# Patient Record
Sex: MALE | Race: WHITE | NOT HISPANIC OR LATINO | Employment: OTHER | URBAN - NONMETROPOLITAN AREA
[De-identification: names, ages, dates, MRNs, and addresses within clinical notes are randomized per-mention and may not be internally consistent; named-entity substitution may affect disease eponyms.]

---

## 2024-08-15 ENCOUNTER — APPOINTMENT (OUTPATIENT)
Dept: OTHER | Facility: HOSPITAL | Age: 64
End: 2024-08-15
Payer: MEDICARE

## 2024-08-15 ENCOUNTER — HOSPITAL ENCOUNTER (INPATIENT)
Facility: HOSPITAL | Age: 64
LOS: 13 days | Discharge: HOME | End: 2024-08-28
Attending: INTERNAL MEDICINE | Admitting: FAMILY MEDICINE
Payer: MEDICARE

## 2024-08-15 DIAGNOSIS — R07.89 BURNING IN THE CHEST: ICD-10-CM

## 2024-08-15 DIAGNOSIS — J86.9 EMPYEMA LUNG: ICD-10-CM

## 2024-08-15 DIAGNOSIS — R63.4 WEIGHT LOSS: ICD-10-CM

## 2024-08-15 DIAGNOSIS — E83.42 HYPOMAGNESEMIA: ICD-10-CM

## 2024-08-15 DIAGNOSIS — D50.9 MICROCYTIC ANEMIA: ICD-10-CM

## 2024-08-15 DIAGNOSIS — Z87.820 HISTORY OF TRAUMATIC BRAIN INJURY: ICD-10-CM

## 2024-08-15 DIAGNOSIS — R53.1 GENERALIZED WEAKNESS: ICD-10-CM

## 2024-08-15 DIAGNOSIS — E83.52 HYPERCALCEMIA: ICD-10-CM

## 2024-08-15 DIAGNOSIS — Z74.09 IMPAIRED MOBILITY: Primary | ICD-10-CM

## 2024-08-15 DIAGNOSIS — E43 SEVERE MALNUTRITION: ICD-10-CM

## 2024-08-15 DIAGNOSIS — R93.89 ABNORMAL FINDING ON IMAGING: ICD-10-CM

## 2024-08-16 ENCOUNTER — APPOINTMENT (OUTPATIENT)
Dept: CT IMAGING | Facility: HOSPITAL | Age: 64
End: 2024-08-16
Payer: MEDICARE

## 2024-08-16 ENCOUNTER — TELEPHONE (OUTPATIENT)
Age: 64
End: 2024-08-16
Payer: MEDICARE

## 2024-08-16 PROBLEM — E43 SEVERE MALNUTRITION: Status: ACTIVE | Noted: 2024-08-16

## 2024-08-16 PROBLEM — R93.89 ABNORMAL FINDING ON IMAGING: Status: ACTIVE | Noted: 2024-08-16

## 2024-08-16 PROBLEM — D50.9 MICROCYTIC ANEMIA: Status: ACTIVE | Noted: 2024-08-16

## 2024-08-16 PROBLEM — E83.52 HYPERCALCEMIA: Status: ACTIVE | Noted: 2024-08-16

## 2024-08-16 PROBLEM — E83.42 HYPOMAGNESEMIA: Status: ACTIVE | Noted: 2024-08-16

## 2024-08-16 PROBLEM — R63.4 WEIGHT LOSS: Status: ACTIVE | Noted: 2024-08-16

## 2024-08-16 PROBLEM — Z87.820 HISTORY OF TRAUMATIC BRAIN INJURY: Status: ACTIVE | Noted: 2024-08-16

## 2024-08-16 LAB
ABO GROUP BLD: NORMAL
ALBUMIN SERPL-MCNC: 3.2 G/DL (ref 3.5–5.2)
ALBUMIN/GLOB SERPL: 0.8 G/DL
ALP SERPL-CCNC: 152 U/L (ref 39–117)
ALT SERPL W P-5'-P-CCNC: 19 U/L (ref 1–41)
AMPHET+METHAMPHET UR QL: NEGATIVE
AMPHETAMINES UR QL: NEGATIVE
ANION GAP SERPL CALCULATED.3IONS-SCNC: 11 MMOL/L (ref 5–15)
ANISOCYTOSIS BLD QL: ABNORMAL
APPEARANCE FLD: ABNORMAL
AST SERPL-CCNC: 42 U/L (ref 1–40)
BARBITURATES UR QL SCN: NEGATIVE
BASO STIPL COARSE BLD QL SMEAR: ABNORMAL
BASOPHILS # BLD MANUAL: 0.08 10*3/MM3 (ref 0–0.2)
BASOPHILS NFR BLD MANUAL: 1 % (ref 0–1.5)
BENZODIAZ UR QL SCN: NEGATIVE
BILIRUB SERPL-MCNC: 0.6 MG/DL (ref 0–1.2)
BLD GP AB SCN SERPL QL: NEGATIVE
BUN SERPL-MCNC: 14 MG/DL (ref 8–23)
BUN/CREAT SERPL: 19.4 (ref 7–25)
BUPRENORPHINE SERPL-MCNC: NEGATIVE NG/ML
CALCIUM SPEC-SCNC: 10.4 MG/DL (ref 8.6–10.5)
CANNABINOIDS SERPL QL: NEGATIVE
CHLORIDE SERPL-SCNC: 98 MMOL/L (ref 98–107)
CO2 SERPL-SCNC: 28 MMOL/L (ref 22–29)
COCAINE UR QL: NEGATIVE
COLOR FLD: ABNORMAL
CREAT SERPL-MCNC: 0.72 MG/DL (ref 0.76–1.27)
DEPRECATED RDW RBC AUTO: 56.5 FL (ref 37–54)
EGFRCR SERPLBLD CKD-EPI 2021: 101.4 ML/MIN/1.73
EOSINOPHIL # BLD MANUAL: 0 10*3/MM3 (ref 0–0.4)
EOSINOPHIL NFR BLD MANUAL: 0 % (ref 0.3–6.2)
ERYTHROCYTE [DISTWIDTH] IN BLOOD BY AUTOMATED COUNT: 23 % (ref 12.3–15.4)
FENTANYL UR-MCNC: NEGATIVE NG/ML
FERRITIN SERPL-MCNC: 29.14 NG/ML (ref 30–400)
GIANT PLATELETS: ABNORMAL
GLOBULIN UR ELPH-MCNC: 3.9 GM/DL
GLUCOSE SERPL-MCNC: 102 MG/DL (ref 65–99)
HCT VFR BLD AUTO: 25.1 % (ref 37.5–51)
HCT VFR BLD AUTO: 25.6 % (ref 37.5–51)
HGB BLD-MCNC: 6.6 G/DL (ref 13–17.7)
HGB BLD-MCNC: 7.2 G/DL (ref 13–17.7)
HYPOCHROMIA BLD QL: ABNORMAL
INR PPP: 0.98 (ref 0.91–1.09)
IRON 24H UR-MRATE: 14 MCG/DL (ref 59–158)
IRON SATN MFR SERPL: 3 % (ref 20–50)
LYMPHOCYTES # BLD MANUAL: 0.31 10*3/MM3 (ref 0.7–3.1)
LYMPHOCYTES NFR BLD MANUAL: 4 % (ref 5–12)
LYMPHOCYTES NFR FLD MANUAL: 3 %
MAGNESIUM SERPL-MCNC: 1.7 MG/DL (ref 1.6–2.4)
MCH RBC QN AUTO: 19.1 PG (ref 26.6–33)
MCHC RBC AUTO-ENTMCNC: 26.3 G/DL (ref 31.5–35.7)
MCV RBC AUTO: 72.8 FL (ref 79–97)
METHADONE UR QL SCN: NEGATIVE
METHOD: ABNORMAL
MICROCYTES BLD QL: ABNORMAL
MONOCYTES # BLD: 0.31 10*3/MM3 (ref 0.1–0.9)
NEUTROPHILS # BLD AUTO: 7.06 10*3/MM3 (ref 1.7–7)
NEUTROPHILS NFR BLD MANUAL: 91 % (ref 42.7–76)
NEUTROPHILS NFR FLD MANUAL: 97 %
NUC CELL # FLD: ABNORMAL /MM3
OPIATES UR QL: NEGATIVE
OXYCODONE UR QL SCN: NEGATIVE
PCP UR QL SCN: NEGATIVE
PHOSPHATE SERPL-MCNC: 3 MG/DL (ref 2.5–4.5)
PLATELET # BLD AUTO: 213 10*3/MM3 (ref 140–450)
PMV BLD AUTO: 9.6 FL (ref 6–12)
POLYCHROMASIA BLD QL SMEAR: ABNORMAL
POTASSIUM SERPL-SCNC: 3.1 MMOL/L (ref 3.5–5.2)
POTASSIUM SERPL-SCNC: 3.4 MMOL/L (ref 3.5–5.2)
PREALB SERPL-MCNC: 10.1 MG/DL (ref 20–40)
PROCALCITONIN SERPL-MCNC: 0.17 NG/ML (ref 0–0.25)
PROT SERPL-MCNC: 7.1 G/DL (ref 6–8.5)
PROTHROMBIN TIME: 13.4 SECONDS (ref 11.8–14.8)
RBC # BLD AUTO: 3.45 10*6/MM3 (ref 4.14–5.8)
RBC # FLD AUTO: ABNORMAL /MM3
RH BLD: POSITIVE
SODIUM SERPL-SCNC: 137 MMOL/L (ref 136–145)
T&S EXPIRATION DATE: NORMAL
T4 FREE SERPL-MCNC: 1.24 NG/DL (ref 0.93–1.7)
TIBC SERPL-MCNC: 457 MCG/DL (ref 298–536)
TRANSFERRIN SERPL-MCNC: 307 MG/DL (ref 200–360)
TRICYCLICS UR QL SCN: NEGATIVE
TSH SERPL DL<=0.05 MIU/L-ACNC: 3.28 UIU/ML (ref 0.27–4.2)
VARIANT LYMPHS NFR BLD MANUAL: 4 % (ref 19.6–45.3)
WBC MORPH BLD: NORMAL
WBC NRBC COR # BLD AUTO: 7.76 10*3/MM3 (ref 3.4–10.8)

## 2024-08-16 PROCEDURE — 85018 HEMOGLOBIN: CPT | Performed by: FAMILY MEDICINE

## 2024-08-16 PROCEDURE — 87075 CULTR BACTERIA EXCEPT BLOOD: CPT | Performed by: NURSE PRACTITIONER

## 2024-08-16 PROCEDURE — 87205 SMEAR GRAM STAIN: CPT | Performed by: NURSE PRACTITIONER

## 2024-08-16 PROCEDURE — 89051 BODY FLUID CELL COUNT: CPT | Performed by: NURSE PRACTITIONER

## 2024-08-16 PROCEDURE — 87070 CULTURE OTHR SPECIMN AEROBIC: CPT | Performed by: NURSE PRACTITIONER

## 2024-08-16 PROCEDURE — 99222 1ST HOSP IP/OBS MODERATE 55: CPT | Performed by: INTERNAL MEDICINE

## 2024-08-16 PROCEDURE — 88112 CYTOPATH CELL ENHANCE TECH: CPT | Performed by: NURSE PRACTITIONER

## 2024-08-16 PROCEDURE — 85025 COMPLETE CBC W/AUTO DIFF WBC: CPT | Performed by: INTERNAL MEDICINE

## 2024-08-16 PROCEDURE — P9016 RBC LEUKOCYTES REDUCED: HCPCS

## 2024-08-16 PROCEDURE — 84466 ASSAY OF TRANSFERRIN: CPT | Performed by: INTERNAL MEDICINE

## 2024-08-16 PROCEDURE — 84439 ASSAY OF FREE THYROXINE: CPT | Performed by: INTERNAL MEDICINE

## 2024-08-16 PROCEDURE — 88305 TISSUE EXAM BY PATHOLOGIST: CPT | Performed by: NURSE PRACTITIONER

## 2024-08-16 PROCEDURE — 83540 ASSAY OF IRON: CPT | Performed by: INTERNAL MEDICINE

## 2024-08-16 PROCEDURE — 71250 CT THORAX DX C-: CPT

## 2024-08-16 PROCEDURE — 87186 SC STD MICRODIL/AGAR DIL: CPT | Performed by: NURSE PRACTITIONER

## 2024-08-16 PROCEDURE — 82728 ASSAY OF FERRITIN: CPT | Performed by: INTERNAL MEDICINE

## 2024-08-16 PROCEDURE — 85610 PROTHROMBIN TIME: CPT | Performed by: INTERNAL MEDICINE

## 2024-08-16 PROCEDURE — 86901 BLOOD TYPING SEROLOGIC RH(D): CPT | Performed by: INTERNAL MEDICINE

## 2024-08-16 PROCEDURE — 80053 COMPREHEN METABOLIC PANEL: CPT | Performed by: INTERNAL MEDICINE

## 2024-08-16 PROCEDURE — 85007 BL SMEAR W/DIFF WBC COUNT: CPT | Performed by: INTERNAL MEDICINE

## 2024-08-16 PROCEDURE — 86900 BLOOD TYPING SEROLOGIC ABO: CPT

## 2024-08-16 PROCEDURE — 84132 ASSAY OF SERUM POTASSIUM: CPT | Performed by: INTERNAL MEDICINE

## 2024-08-16 PROCEDURE — 84145 PROCALCITONIN (PCT): CPT | Performed by: INTERNAL MEDICINE

## 2024-08-16 PROCEDURE — 84100 ASSAY OF PHOSPHORUS: CPT | Performed by: INTERNAL MEDICINE

## 2024-08-16 PROCEDURE — 83735 ASSAY OF MAGNESIUM: CPT | Performed by: INTERNAL MEDICINE

## 2024-08-16 PROCEDURE — 86920 COMPATIBILITY TEST SPIN: CPT

## 2024-08-16 PROCEDURE — 25010000002 NA FERRIC GLUC CPLX PER 12.5 MG: Performed by: INTERNAL MEDICINE

## 2024-08-16 PROCEDURE — 84134 ASSAY OF PREALBUMIN: CPT | Performed by: NURSE PRACTITIONER

## 2024-08-16 PROCEDURE — 86850 RBC ANTIBODY SCREEN: CPT | Performed by: INTERNAL MEDICINE

## 2024-08-16 PROCEDURE — 36430 TRANSFUSION BLD/BLD COMPNT: CPT

## 2024-08-16 PROCEDURE — 80307 DRUG TEST PRSMV CHEM ANLYZR: CPT | Performed by: INTERNAL MEDICINE

## 2024-08-16 PROCEDURE — 99222 1ST HOSP IP/OBS MODERATE 55: CPT | Performed by: SURGERY

## 2024-08-16 PROCEDURE — 87077 CULTURE AEROBIC IDENTIFY: CPT | Performed by: NURSE PRACTITIONER

## 2024-08-16 PROCEDURE — 25010000002 PIPERACILLIN SOD-TAZOBACTAM PER 1 G: Performed by: INTERNAL MEDICINE

## 2024-08-16 PROCEDURE — 0W9930Z DRAINAGE OF RIGHT PLEURAL CAVITY WITH DRAINAGE DEVICE, PERCUTANEOUS APPROACH: ICD-10-PCS | Performed by: STUDENT IN AN ORGANIZED HEALTH CARE EDUCATION/TRAINING PROGRAM

## 2024-08-16 PROCEDURE — 25810000003 SODIUM CHLORIDE 0.9 % SOLUTION: Performed by: INTERNAL MEDICINE

## 2024-08-16 PROCEDURE — 86900 BLOOD TYPING SEROLOGIC ABO: CPT | Performed by: INTERNAL MEDICINE

## 2024-08-16 PROCEDURE — 87040 BLOOD CULTURE FOR BACTERIA: CPT | Performed by: FAMILY MEDICINE

## 2024-08-16 PROCEDURE — 85014 HEMATOCRIT: CPT | Performed by: FAMILY MEDICINE

## 2024-08-16 PROCEDURE — 25010000002 LIDOCAINE 1 % SOLUTION: Performed by: STUDENT IN AN ORGANIZED HEALTH CARE EDUCATION/TRAINING PROGRAM

## 2024-08-16 PROCEDURE — 84443 ASSAY THYROID STIM HORMONE: CPT | Performed by: INTERNAL MEDICINE

## 2024-08-16 RX ORDER — POTASSIUM CHLORIDE 750 MG/1
40 CAPSULE, EXTENDED RELEASE ORAL EVERY 4 HOURS
Status: COMPLETED | OUTPATIENT
Start: 2024-08-16 | End: 2024-08-16

## 2024-08-16 RX ORDER — BISACODYL 5 MG/1
5 TABLET, DELAYED RELEASE ORAL DAILY PRN
Status: DISCONTINUED | OUTPATIENT
Start: 2024-08-16 | End: 2024-08-28 | Stop reason: HOSPADM

## 2024-08-16 RX ORDER — ONDANSETRON 2 MG/ML
4 INJECTION INTRAMUSCULAR; INTRAVENOUS EVERY 6 HOURS PRN
Status: DISCONTINUED | OUTPATIENT
Start: 2024-08-16 | End: 2024-08-28 | Stop reason: HOSPADM

## 2024-08-16 RX ORDER — ACETAMINOPHEN 325 MG/1
650 TABLET ORAL EVERY 4 HOURS PRN
Status: DISCONTINUED | OUTPATIENT
Start: 2024-08-16 | End: 2024-08-28 | Stop reason: HOSPADM

## 2024-08-16 RX ORDER — SODIUM CHLORIDE 9 MG/ML
40 INJECTION, SOLUTION INTRAVENOUS AS NEEDED
Status: DISCONTINUED | OUTPATIENT
Start: 2024-08-16 | End: 2024-08-26

## 2024-08-16 RX ORDER — LIDOCAINE HYDROCHLORIDE AND EPINEPHRINE 10; 10 MG/ML; UG/ML
20 INJECTION, SOLUTION INFILTRATION; PERINEURAL ONCE
Status: DISCONTINUED | OUTPATIENT
Start: 2024-08-16 | End: 2024-08-16

## 2024-08-16 RX ORDER — ACETAMINOPHEN 160 MG/5ML
650 SOLUTION ORAL EVERY 4 HOURS PRN
Status: DISCONTINUED | OUTPATIENT
Start: 2024-08-16 | End: 2024-08-28 | Stop reason: HOSPADM

## 2024-08-16 RX ORDER — SODIUM CHLORIDE 0.9 % (FLUSH) 0.9 %
10 SYRINGE (ML) INJECTION AS NEEDED
Status: DISCONTINUED | OUTPATIENT
Start: 2024-08-16 | End: 2024-08-26

## 2024-08-16 RX ORDER — ACETAMINOPHEN 650 MG/1
650 SUPPOSITORY RECTAL EVERY 4 HOURS PRN
Status: DISCONTINUED | OUTPATIENT
Start: 2024-08-16 | End: 2024-08-28 | Stop reason: HOSPADM

## 2024-08-16 RX ORDER — LIDOCAINE HYDROCHLORIDE 10 MG/ML
INJECTION, SOLUTION INFILTRATION; PERINEURAL AS NEEDED
Status: COMPLETED | OUTPATIENT
Start: 2024-08-16 | End: 2024-08-16

## 2024-08-16 RX ORDER — POLYETHYLENE GLYCOL 3350 17 G/17G
17 POWDER, FOR SOLUTION ORAL DAILY PRN
Status: DISCONTINUED | OUTPATIENT
Start: 2024-08-16 | End: 2024-08-28 | Stop reason: HOSPADM

## 2024-08-16 RX ORDER — SODIUM CHLORIDE 9 MG/ML
75 INJECTION, SOLUTION INTRAVENOUS CONTINUOUS
Status: DISCONTINUED | OUTPATIENT
Start: 2024-08-16 | End: 2024-08-19

## 2024-08-16 RX ORDER — SODIUM CHLORIDE 0.9 % (FLUSH) 0.9 %
10 SYRINGE (ML) INJECTION EVERY 12 HOURS SCHEDULED
Status: DISCONTINUED | OUTPATIENT
Start: 2024-08-16 | End: 2024-08-24 | Stop reason: SDUPTHER

## 2024-08-16 RX ORDER — AMOXICILLIN 250 MG
2 CAPSULE ORAL 2 TIMES DAILY PRN
Status: DISCONTINUED | OUTPATIENT
Start: 2024-08-16 | End: 2024-08-28 | Stop reason: HOSPADM

## 2024-08-16 RX ORDER — BISACODYL 10 MG
10 SUPPOSITORY, RECTAL RECTAL DAILY PRN
Status: DISCONTINUED | OUTPATIENT
Start: 2024-08-16 | End: 2024-08-28 | Stop reason: HOSPADM

## 2024-08-16 RX ADMIN — Medication 10 ML: at 20:17

## 2024-08-16 RX ADMIN — POTASSIUM CHLORIDE 40 MEQ: 750 CAPSULE, EXTENDED RELEASE ORAL at 17:04

## 2024-08-16 RX ADMIN — PIPERACILLIN AND TAZOBACTAM 4.5 G: 4; .5 INJECTION, POWDER, FOR SOLUTION INTRAVENOUS at 19:26

## 2024-08-16 RX ADMIN — SODIUM CHLORIDE 75 ML/HR: 9 INJECTION, SOLUTION INTRAVENOUS at 00:59

## 2024-08-16 RX ADMIN — POTASSIUM CHLORIDE 40 MEQ: 750 CAPSULE, EXTENDED RELEASE ORAL at 07:04

## 2024-08-16 RX ADMIN — SODIUM CHLORIDE 250 MG: 9 INJECTION, SOLUTION INTRAVENOUS at 10:19

## 2024-08-16 RX ADMIN — POTASSIUM CHLORIDE 40 MEQ: 750 CAPSULE, EXTENDED RELEASE ORAL at 10:33

## 2024-08-16 RX ADMIN — Medication 10 ML: at 10:19

## 2024-08-16 RX ADMIN — Medication 5 MG: at 00:56

## 2024-08-16 RX ADMIN — LIDOCAINE HYDROCHLORIDE 4 ML: 10 INJECTION, SOLUTION INFILTRATION; PERINEURAL at 15:10

## 2024-08-16 RX ADMIN — Medication 10 ML: at 01:07

## 2024-08-16 NOTE — PLAN OF CARE
Goal Outcome Evaluation:           Progress: no change  Outcome Evaluation: VSS NSR 82-85.  Pt arrived to floor from other hospital midway through shift.  Has denied any pain nausea or sob since arrival.  IV fluids per MAR.  Plans for chest CT in am.  Will continue to monitor.                                No

## 2024-08-16 NOTE — TELEPHONE ENCOUNTER
New Consult   Ravin Garza 1960    Jimena @ [facility] [phone] called in consult. Dr. Sawyer is consulting for empyema. Patient is in room 452.      Ok, CBL
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
## 2024-08-16 NOTE — CONSULTS
Adult Nutrition  Assessment/PES    Patient Name:  Ravin Garza  YOB: 1960  MRN: [id number]  Admit Date:  8/15/2024    Assessment Date:  8/16/2024     Reason for Assessment       Row Name 08/16/24 1416          Reason for Assessment    Reason For Assessment nurse/nurse practitioner consult;per organizational policy;physician consult     Diagnosis neurologic conditions;pulmonary disease     Identified At Risk by Screening Criteria MST SCORE 2+;unintentional loss of 10 lbs or more in the past 2 mos;reduced oral intake over the last month               Nutrition/Diet History       Row Name 08/16/24 1431          Nutrition/Diet History    Typical Intake (Food/Fluid/EN/PN) Pt in the room with his sisters. Pt was able to answer most of the questions, although he was noticeably fatigued. Pt reports significant wt fluctuations over the past year, ranging from 170lb to 180lb and back to 170lb. Pt noted having a very low appetite at home and mentioned that he could go 'three days without eating,' as well as experiencing intense nausea. Pt started drinking Boost Plus Vanilla at home to try to gain weight.     Supplemental Drinks/Foods/Additives Boost plus vanilla     Factors Affecting Nutritional Intake appetite               Row Name 08/16/24 1423          Malnutrition Severity Assessment    Malnutrition Type Chronic Disease - Related Malnutrition        Insufficient Energy Intake     Insufficient Energy Intake Findings Severe     Insufficient Energy Intake  <75% of est. energy requirement for > or equal to 1 month        Unintentional Weight Loss     Unintentional Weight Loss Findings Severe     Unintentional Weight Loss  Weight loss greater than 5% in one month        Muscle Loss    Loss of Muscle Mass Findings Severe     [place] Region Severe - deep hollowing/scooping, lack of muscle to touch, facial bones well defined     Clavicle Bone Region Severe - protruding prominent bone     Acromion Bone Region  Severe - squared shoulders, bones, and acromion process protrusion prominent     Scapular Bone Region Severe - prominent bones, depressions easily visible between ribs, scapula, spine, shoulders     Dorsal Hand Region Severe - prominent depression     Patellar Region Severe - prominent bone, square looking, very little muscle definition     Anterior Thigh Region Severe - line/depression along thigh, obviously thin     Posterior Calf Region Severe - thin with very little definition/firmness  Left BKA        Fat Loss    Subcutaneous Fat Loss Findings Severe     Orbital Region  Severe - pronounced hollowness/depression, dark circles, loose saggy skin     Upper Arm Region Severe - mostly skin, very little space between folds, fingers touch     Thoracic & Lumbar Region Moderate - ribs visible with mild depressions, iliac crest somewhat prominent        Declining Functional Status    Declining Functional Status Findings --  Fatigue and poor wound healing        Criteria Met (Must meet criteria for severity in at least 2 of these categories: M Wasting, Fat Loss, Fluid, Secondary Signs, Wt. Status, Intake)    Patient meets criteria for  Severe Malnutrition            Labs/Tests/Procedures/Meds       Row Name 08/16/24 1447          Labs/Procedures/Meds    Lab Results Reviewed reviewed     Lab Results Comments Glu 102, K 3.1, Alb 3.2, Hb 7.1 and MCV of 72        Diagnostic Tests/Procedures    Diagnostic Test/Procedure Reviewed reviewed     Diagnostic Test/Procedures Comments Ct of pelvis: cirrhosis and ascites        Medications    Pertinent Medications Reviewed reviewed               Physical Findings       Row Name 08/16/24 1449          Physical Findings    Overall Physical Appearance Generalized weakness, Ivan Score 20, room air, AKA,Ill-fitting leg prosthesis.               Estimated/Assessed Needs - Anthropometrics       Row Name 08/16/24 1450          Anthropometrics    Weight for Calculation 77 kg (169 lb 12.1 oz)      Additional Documentation --  Adj BW: 179lb        Estimated/Assessed Needs    Additional Documentation KCAL/KG (Group);Fluid Requirements (Group);Protein Requirements (Group)        KCAL/KG    KCAL/KG 25 Kcal/Kg (kcal);30 Kcal/Kg (kcal)     25 Kcal/Kg (kcal) 1925     30 Kcal/Kg (kcal) 2310        Protein Requirements    Weight Used For Protein Calculations 77 kg (169 lb 12.1 oz)     Est Protein Requirement Amount (gms/kg) 1.5 gm protein     Estimated Protein Requirements (gms/day) 115.5        Fluid Requirements    Fluid Requirements (mL/day) 2310     RDA Method (mL) 2310                 Nutrition Prescription Ordered       Row Name 08/16/24 1458          Nutrition Prescription PO    Current PO Diet NPO               Evaluation of Received Nutrient/Fluid Intake       Row Name 08/16/24 1458          Nutrient/Fluid Evaluation    Number of Days Evaluated 1 day        Fluid Intake Evaluation    Oral Fluid (mL) 240        PO Evaluation    Number of Days PO Intake Evaluated 1 day     Number of Meals 1  Breakfast     % PO Intake 100               Malnutrition Severity Assessment       Row Name 08/16/24 1423          Malnutrition Severity Assessment    Malnutrition Type Chronic Disease - Related Malnutrition        Insufficient Energy Intake     Insufficient Energy Intake Findings Severe     Insufficient Energy Intake  <75% of est. energy requirement for > or equal to 1 month        Unintentional Weight Loss     Unintentional Weight Loss Findings Severe     Unintentional Weight Loss  Weight loss greater than 5% in one month        Muscle Loss    Loss of Muscle Mass Findings Severe     [place] Region Severe - deep hollowing/scooping, lack of muscle to touch, facial bones well defined     Clavicle Bone Region Severe - protruding prominent bone     Acromion Bone Region Severe - squared shoulders, bones, and acromion process protrusion prominent     Scapular Bone Region Severe - prominent bones, depressions easily visible  between ribs, scapula, spine, shoulders     Dorsal Hand Region Severe - prominent depression     Patellar Region Severe - prominent bone, square looking, very little muscle definition     Anterior Thigh Region Severe - line/depression along thigh, obviously thin     Posterior Calf Region Severe - thin with very little definition/firmness  Left BKA        Fat Loss    Subcutaneous Fat Loss Findings Severe     Orbital Region  Severe - pronounced hollowness/depression, dark circles, loose saggy skin     Upper Arm Region Severe - mostly skin, very little space between folds, fingers touch     Thoracic & Lumbar Region Moderate - ribs visible with mild depressions, iliac crest somewhat prominent        Declining Functional Status    Declining Functional Status Findings --  Fatigue and poor wound healing        Criteria Met (Must meet criteria for severity in at least 2 of these categories: M Wasting, Fat Loss, Fluid, Secondary Signs, Wt. Status, Intake)    Patient meets criteria for  Severe Malnutrition              Problem/Interventions:   Problem 1       Row Name 08/16/24 1501          Nutrition Diagnoses Problem 1    Problem 1 Other (comment)  Severe Malnutrition in the Context of Chronic Ilness     Etiology (related to) Factors Affecting Nutrition;Medical Diagnosis     Hepatic Cirrhosis     Neurological TBI     Appetite Poor     Other --     Signs/Symptoms (evidenced by) Report of Mnimal PO Intake;Report/Observation;NPO  MST results     Unintended Weight Change Loss     Number of Pounds Lost 10lb     Reported/Observed By RD/Tech     Other Comment Muscle and fat wasting per NFPE and chronic non healing wounds               Intervention Goal       Row Name 08/16/24 1513          Intervention Goal    General Meet nutritional needs for age/condition;Reduce/improve symptoms     PO Establish PO;Increase intake;PO intake (%)     PO Intake % 75 %     Weight Appropriate weight gain               Nutrition Intervention       Row  Name 08/16/24 1515          Nutrition Intervention    RD/Tech Action Await begin PO;Encourage intake;Supplement provided;Follow Tx progress;Care plan reviewd               Nutrition Prescription       Row Name 08/16/24 1518          Nutrition Prescription PO    PO Prescription Begin/change supplement     Supplement Boost Plus     Supplement Frequency 3 times a day     New PO Prescription Ordered? Yes  Co sign by Jazmin Perez RD, LD.               Education/Evaluation       Row Name 08/16/24 1516          Education    Education Provided education regarding     Provided education regarding Nutrition related factor        Monitor/Evaluation    Monitor Per protocol;PO intake;Supplement intake;Weight;Symptoms              Electronically signed by:  Valeria Bronson  08/16/24 15:22 CDT

## 2024-08-16 NOTE — PROGRESS NOTES
"Pharmacy Review Antimicrobial Stewardship     Current Antimicrobials:   Pharmacy to Dose Zosyn  piperacillin-tazobactam (ZOSYN) 4.5 g IVPB in 100 mL NS MBP (CD)    Indication(s): Empyema  Days of Therapy: 1 of 5    Is the therapy & duration appropriate based on evidence-based guidelines?: appropriate  Changes Recommended: No    Assessment/Action: Received a \"Pharmacy to Dose\" consult for the indication of empyema. Reviewed the patient's age, CrCl, and other labs. ID FOLLOWING.Initiated the patient on Zosyn extended infusion protocol of 4.5 g IVPB ONCE followed by 4.5 g IVPB Q8H for the duration of 5 days. No other action required at this time per pharmacy based on available information and current clinical status. Will continue routine follow-up evaluation and make any necessary adjustments.    Jaclyn He, PharmD  08/16/24 18:29 CDT      Subjective:   Diagnosis:   No diagnosis found.     Patient Active Problem List   Diagnosis    Generalized weakness    Microcytic anemia    Hypomagnesemia    Weight loss    History of traumatic brain injury    Abnormal finding on imaging - outpatient ct abd w/ ? empyema    Hypercalcemia    Severe malnutrition       Allergies:  Allergies as of 08/15/2024    (No Known Allergies)       Past Medical History:   Diagnosis Date    Amputee, above knee     bilateral    ETOH abuse        Objective:  Current Antimicrobial Medications:   Anti-Infectives (From admission, onward)      Ordered     Dose/Rate Route Frequency Start Stop    08/16/24 1826  piperacillin-tazobactam (ZOSYN) 4.5 g IVPB in 100 mL NS MBP (CD)        Ordering Provider: Mickey Laguerre MD    4.5 g  over 4 Hours Intravenous Every 8 Hours 08/17/24 0115 08/22/24 0114    08/16/24 1826  piperacillin-tazobactam (ZOSYN) 4.5 g IVPB in 100 mL NS MBP (CD)        Ordering Provider: Mickey Laguerre MD    4.5 g  over 30 Minutes Intravenous Once 08/16/24 1915 08/16/24 1824  Pharmacy to Dose Zosyn        Ordering Provider: " "Mickey Laguerre MD     Does not apply Continuous PRN 08/16/24 1823 08/23/24 1822            Culture Results:  No results found for: \"MRSAPCR\", \"BLOODCX\", \"BCIDPCR\", \"URINECX\", \"WOUNDCX\", \"RESPCX\", \"RVP\"    Microbiology Results (last 10 days)       ** No results found for the last 240 hours. **            Lab Results   Component Value Date    WBC 7.76 08/16/2024    WBC 12.1 (H) 09/10/2021    WBC 14.0 (H) 08/27/2021     Temp Readings from Last 1 Encounters:   08/16/24 97.2 °F (36.2 °C) (Oral)       "

## 2024-08-17 ENCOUNTER — APPOINTMENT (OUTPATIENT)
Dept: GENERAL RADIOLOGY | Facility: HOSPITAL | Age: 64
End: 2024-08-17
Payer: MEDICARE

## 2024-08-17 LAB
ALBUMIN SERPL-MCNC: 2.9 G/DL (ref 3.5–5.2)
ALBUMIN/GLOB SERPL: 0.8 G/DL
ALP SERPL-CCNC: 158 U/L (ref 39–117)
ALT SERPL W P-5'-P-CCNC: 21 U/L (ref 1–41)
ANION GAP SERPL CALCULATED.3IONS-SCNC: 12 MMOL/L (ref 5–15)
AST SERPL-CCNC: 43 U/L (ref 1–40)
BASOPHILS # BLD AUTO: 0.03 10*3/MM3 (ref 0–0.2)
BASOPHILS NFR BLD AUTO: 0.4 % (ref 0–1.5)
BH BB BLOOD EXPIRATION DATE: NORMAL
BH BB BLOOD TYPE BARCODE: 6200
BH BB DISPENSE STATUS: NORMAL
BH BB PRODUCT CODE: NORMAL
BH BB UNIT NUMBER: NORMAL
BILIRUB SERPL-MCNC: 0.7 MG/DL (ref 0–1.2)
BUN SERPL-MCNC: 11 MG/DL (ref 8–23)
BUN/CREAT SERPL: 15.7 (ref 7–25)
CALCIUM SPEC-SCNC: 9.5 MG/DL (ref 8.6–10.5)
CHLORIDE SERPL-SCNC: 100 MMOL/L (ref 98–107)
CHOLEST SERPL-MCNC: 100 MG/DL (ref 0–200)
CO2 SERPL-SCNC: 24 MMOL/L (ref 22–29)
CREAT SERPL-MCNC: 0.7 MG/DL (ref 0.76–1.27)
CROSSMATCH INTERPRETATION: NORMAL
DEPRECATED RDW RBC AUTO: 58.2 FL (ref 37–54)
EGFRCR SERPLBLD CKD-EPI 2021: 102.3 ML/MIN/1.73
EOSINOPHIL # BLD AUTO: 0.06 10*3/MM3 (ref 0–0.4)
EOSINOPHIL NFR BLD AUTO: 0.7 % (ref 0.3–6.2)
ERYTHROCYTE [DISTWIDTH] IN BLOOD BY AUTOMATED COUNT: 23.4 % (ref 12.3–15.4)
GLOBULIN UR ELPH-MCNC: 3.7 GM/DL
GLUCOSE SERPL-MCNC: 82 MG/DL (ref 65–99)
HBA1C MFR BLD: 5 % (ref 4.8–5.6)
HCT VFR BLD AUTO: 28.5 % (ref 37.5–51)
HDLC SERPL-MCNC: 45 MG/DL (ref 40–60)
HGB BLD-MCNC: 7.8 G/DL (ref 13–17.7)
IMM GRANULOCYTES # BLD AUTO: 0.04 10*3/MM3 (ref 0–0.05)
IMM GRANULOCYTES NFR BLD AUTO: 0.5 % (ref 0–0.5)
LDLC SERPL CALC-MCNC: 40 MG/DL (ref 0–100)
LDLC/HDLC SERPL: 0.9 {RATIO}
LYMPHOCYTES # BLD AUTO: 0.63 10*3/MM3 (ref 0.7–3.1)
LYMPHOCYTES NFR BLD AUTO: 7.8 % (ref 19.6–45.3)
MCH RBC QN AUTO: 20 PG (ref 26.6–33)
MCHC RBC AUTO-ENTMCNC: 27.4 G/DL (ref 31.5–35.7)
MCV RBC AUTO: 73.1 FL (ref 79–97)
MONOCYTES # BLD AUTO: 0.71 10*3/MM3 (ref 0.1–0.9)
MONOCYTES NFR BLD AUTO: 8.8 % (ref 5–12)
NEUTROPHILS NFR BLD AUTO: 6.61 10*3/MM3 (ref 1.7–7)
NEUTROPHILS NFR BLD AUTO: 81.8 % (ref 42.7–76)
PLATELET # BLD AUTO: 190 10*3/MM3 (ref 140–450)
PMV BLD AUTO: 9.1 FL (ref 6–12)
POTASSIUM SERPL-SCNC: 3.5 MMOL/L (ref 3.5–5.2)
PROT SERPL-MCNC: 6.6 G/DL (ref 6–8.5)
RBC # BLD AUTO: 3.9 10*6/MM3 (ref 4.14–5.8)
SODIUM SERPL-SCNC: 136 MMOL/L (ref 136–145)
TRIGL SERPL-MCNC: 72 MG/DL (ref 0–150)
UNIT  ABO: NORMAL
UNIT  RH: NORMAL
VLDLC SERPL-MCNC: 15 MG/DL (ref 5–40)
WBC NRBC COR # BLD AUTO: 8.08 10*3/MM3 (ref 3.4–10.8)

## 2024-08-17 PROCEDURE — 25010000002 ALTEPLASE 2 MG RECONSTITUTED SOLUTION 1 EACH VIAL: Performed by: NURSE PRACTITIONER

## 2024-08-17 PROCEDURE — 25010000002 NA FERRIC GLUC CPLX PER 12.5 MG: Performed by: INTERNAL MEDICINE

## 2024-08-17 PROCEDURE — 99232 SBSQ HOSP IP/OBS MODERATE 35: CPT | Performed by: SURGERY

## 2024-08-17 PROCEDURE — 85025 COMPLETE CBC W/AUTO DIFF WBC: CPT | Performed by: FAMILY MEDICINE

## 2024-08-17 PROCEDURE — 25010000002 PIPERACILLIN SOD-TAZOBACTAM PER 1 G: Performed by: INTERNAL MEDICINE

## 2024-08-17 PROCEDURE — 80061 LIPID PANEL: CPT | Performed by: FAMILY MEDICINE

## 2024-08-17 PROCEDURE — 25810000003 SODIUM CHLORIDE 0.9 % SOLUTION: Performed by: INTERNAL MEDICINE

## 2024-08-17 PROCEDURE — 83036 HEMOGLOBIN GLYCOSYLATED A1C: CPT | Performed by: FAMILY MEDICINE

## 2024-08-17 PROCEDURE — 99232 SBSQ HOSP IP/OBS MODERATE 35: CPT | Performed by: INTERNAL MEDICINE

## 2024-08-17 PROCEDURE — 71045 X-RAY EXAM CHEST 1 VIEW: CPT

## 2024-08-17 PROCEDURE — 80053 COMPREHEN METABOLIC PANEL: CPT | Performed by: FAMILY MEDICINE

## 2024-08-17 RX ORDER — FAMOTIDINE 10 MG/ML
20 INJECTION, SOLUTION INTRAVENOUS 2 TIMES DAILY
Status: DISCONTINUED | OUTPATIENT
Start: 2024-08-17 | End: 2024-08-18

## 2024-08-17 RX ORDER — CALCIUM CARBONATE 500 MG/1
2 TABLET, CHEWABLE ORAL 3 TIMES DAILY PRN
Status: DISCONTINUED | OUTPATIENT
Start: 2024-08-17 | End: 2024-08-28 | Stop reason: HOSPADM

## 2024-08-17 RX ADMIN — SODIUM CHLORIDE 75 ML/HR: 9 INJECTION, SOLUTION INTRAVENOUS at 22:43

## 2024-08-17 RX ADMIN — SODIUM CHLORIDE 75 ML/HR: 9 INJECTION, SOLUTION INTRAVENOUS at 06:57

## 2024-08-17 RX ADMIN — DORNASE ALFA 5 MG: 1 SOLUTION RESPIRATORY (INHALATION) at 10:11

## 2024-08-17 RX ADMIN — PIPERACILLIN AND TAZOBACTAM 4.5 G: 4; .5 INJECTION, POWDER, FOR SOLUTION INTRAVENOUS at 01:16

## 2024-08-17 RX ADMIN — PIPERACILLIN AND TAZOBACTAM 4.5 G: 4; .5 INJECTION, POWDER, FOR SOLUTION INTRAVENOUS at 11:14

## 2024-08-17 RX ADMIN — ALTEPLASE 10 MG: 2.2 INJECTION, POWDER, LYOPHILIZED, FOR SOLUTION INTRAVENOUS at 10:11

## 2024-08-17 RX ADMIN — Medication 10 ML: at 08:54

## 2024-08-17 RX ADMIN — SODIUM CHLORIDE 250 MG: 9 INJECTION, SOLUTION INTRAVENOUS at 08:54

## 2024-08-17 RX ADMIN — Medication 10 ML: at 20:24

## 2024-08-17 RX ADMIN — PIPERACILLIN AND TAZOBACTAM 4.5 G: 4; .5 INJECTION, POWDER, FOR SOLUTION INTRAVENOUS at 18:36

## 2024-08-17 RX ADMIN — FAMOTIDINE 20 MG: 10 INJECTION INTRAVENOUS at 20:24

## 2024-08-17 NOTE — PLAN OF CARE
Goal Outcome Evaluation:           Progress: no change  Outcome Evaluation: VSS NSR-ST  per tele.  Denies pain nausea or sob this shift.  Chest tube CDI, no output so far this shift.  IV abx per mar.  Will continue to monitor.
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
## 2024-08-17 NOTE — PROGRESS NOTES
"    [facility] Cardiothoracic Surgery  PROGRESS NOTE       S/P IR insertion right pigtail catheter on 08/16/2024    Subjective:  Sitting up in bed eating breakfast. Denies any complaints.       Objective:      /78 (BP Location: Right arm, Patient Position: Sitting)   Pulse 85   Temp 97.4 °F (36.3 °C) (Oral)   Resp 22   Ht 188 cm (74\")   Wt 77.9 kg (171 lb 12.8 oz)   SpO2 98%   BMI 22.06 kg/m²       Intake/Output Summary (Last 24 hours) at 8/17/2024 1048  Last data filed at 8/17/2024 0730  Gross per 24 hour   Intake 360 ml   Output 565 ml   Net -205 ml       CT Output: no output recorded since insertion.    PE:  Vitals:    08/17/24 0730   BP: 104/78   Pulse: 85   Resp: 22   Temp: 97.4 °F (36.3 °C)   SpO2: 98%     GENERAL: NAD, resting comfortably, normal color  CARDIOVASCULAR: Normal HT with RRR. No murmurs, gallops or rubs.   PULMONARY: No labored breathing. Clear, but diminished bilateral breath sounds R>L. No wheezes or rhonchi.   ABDOMEN: Soft, non-tender/non-distended with active bowel sounds.  EXTREMITIES: No clubbing or cyanosis. Left BKA. No peripheral edema - right leg. Palpable pedal pulses on right.       Lab Results (last 72 hours)       Procedure Component Value Units Date/Time    Body Fluid Culture - Body Fluid, Pleural Cavity [[id number]] Collected: 08/16/24 1550    Specimen: Body Fluid from Pleural Cavity Updated: 08/17/24 1037     Body Fluid Culture No growth     Gram Stain Many (4+) WBCs seen      No organisms seen    Hemoglobin A1c [[id number]]  (Normal) Collected: 08/17/24 0408    Specimen: Blood Updated: 08/17/24 0519     Hemoglobin A1C 5.00 %     Narrative:      Hemoglobin A1C Ranges:    Increased Risk for Diabetes  5.7% to 6.4%  Diabetes                     >= 6.5%  Diabetic Goal                < 7.0%    Comprehensive Metabolic Panel [[id number]]  (Abnormal) Collected: 08/17/24 0409    Specimen: Blood Updated: 08/17/24 0450     Glucose 82 mg/dL      BUN 11 mg/dL      " Creatinine 0.70 mg/dL      Sodium 136 mmol/L      Potassium 3.5 mmol/L      Chloride 100 mmol/L      CO2 24.0 mmol/L      Calcium 9.5 mg/dL      Total Protein 6.6 g/dL      Albumin 2.9 g/dL      ALT (SGPT) 21 U/L      AST (SGOT) 43 U/L      Alkaline Phosphatase 158 U/L      Total Bilirubin 0.7 mg/dL      Globulin 3.7 gm/dL      A/G Ratio 0.8 g/dL      BUN/Creatinine Ratio 15.7     Anion Gap 12.0 mmol/L      eGFR 102.3 mL/min/1.73     Narrative:      GFR Normal >60  Chronic Kidney Disease <60  Kidney Failure <15      Lipid Panel [[id number]] Collected: 08/17/24 0409    Specimen: Blood Updated: 08/17/24 0450     Total Cholesterol 100 mg/dL      Triglycerides 72 mg/dL      HDL Cholesterol 45 mg/dL      LDL Cholesterol  40 mg/dL      VLDL Cholesterol 15 mg/dL      LDL/HDL Ratio 0.90    Narrative:      Cholesterol Reference Ranges  (U.S. Department of Health and Human Services ATP III Classifications)    Desirable          <200 mg/dL  Borderline High    200-239 mg/dL  High Risk          >240 mg/dL      Triglyceride Reference Ranges  (U.S. Department of Health and Human Services ATP III Classifications)    Normal           <150 mg/dL  Borderline High  150-199 mg/dL  High             200-499 mg/dL  Very High        >500 mg/dL    HDL Reference Ranges  (U.S. Department of Health and Human Services ATP III Classifications)    Low     <40 mg/dl (major risk factor for CHD)  High    >60 mg/dl ('negative' risk factor for CHD)        LDL Reference Ranges  (U.S. Department of Health and Human Services ATP III Classifications)    Optimal          <100 mg/dL  Near Optimal     100-129 mg/dL  Borderline High  130-159 mg/dL  High             160-189 mg/dL  Very High        >189 mg/dL    CBC & Differential [[id number]]  (Abnormal) Collected: 08/17/24 0408    Specimen: Blood Updated: 08/17/24 0435    Narrative:      The following orders were created for panel order CBC & Differential.  Procedure                               Abnormality          Status                     ---------                               -----------         ------                     CBC Auto Differential[[id number]]        Abnormal            Final result                 Please view results for these tests on the individual orders.    CBC Auto Differential [[id number]]  (Abnormal) Collected: 08/17/24 0408    Specimen: Blood Updated: 08/17/24 0431     WBC 8.08 10*3/mm3      RBC 3.90 10*6/mm3      Hemoglobin 7.8 g/dL      Hematocrit 28.5 %      MCV 73.1 fL      MCH 20.0 pg      MCHC 27.4 g/dL      RDW 23.4 %      RDW-SD 58.2 fl      MPV 9.1 fL      Platelets 190 10*3/mm3      Neutrophil % 81.8 %      Lymphocyte % 7.8 %      Monocyte % 8.8 %      Eosinophil % 0.7 %      Basophil % 0.4 %      Immature Grans % 0.5 %      Neutrophils, Absolute 6.61 10*3/mm3      Lymphocytes, Absolute 0.63 10*3/mm3      Monocytes, Absolute 0.71 10*3/mm3      Eosinophils, Absolute 0.06 10*3/mm3      Basophils, Absolute 0.03 10*3/mm3      Immature Grans, Absolute 0.04 10*3/mm3     Prealbumin [[id number]]  (Abnormal) Collected: 08/16/24 1308    Specimen: Blood Updated: 08/16/24 1948     Prealbumin 10.1 mg/dL     Body Fluid Cell Count With Differential - Pleural Fluid, Pleural Cavity [[id number]]  (Abnormal) Collected: 08/16/24 1550    Specimen: Pleural Fluid from Pleural Cavity Updated: 08/16/24 1813    Narrative:      The following orders were created for panel order Body Fluid Cell Count With Differential - Pleural Fluid, Pleural Cavity.  Procedure                               Abnormality         Status                     ---------                               -----------         ------                     Body fluid cell count - ...[[id number]]  Abnormal            Final result               Body fluid differential ...[[id number]]                      Final result                 Please view results for these tests on the individual orders.    Body fluid differential - Pleural Fluid, Pleural Cavity  [[id number]] Collected: 08/16/24 1550    Specimen: Pleural Fluid from Pleural Cavity Updated: 08/16/24 1813     Neutrophils, Fluid 97 %      Lymphocytes, Fluid 3 %     Body fluid cell count - Pleural Fluid, Pleural Cavity [[id number]]  (Abnormal) Collected: 08/16/24 1550    Specimen: Pleural Fluid from Pleural Cavity Updated: 08/16/24 1724     Color, Fluid Other     Comment: Light brown        Appearance, Fluid Turbid     RBC, Fluid 140,000 /mm3      Comment: RBC count obtained from 1:5 dilution        Nucleated Cells, Fluid 130,400 /mm3      Comment: WBC count obtained from 1:100 dilution        Method: Automated Sysmex XN Method    Anaerobic Culture - Body Fluid, Pleural Cavity [[id number]] Collected: 08/16/24 1550    Specimen: Body Fluid from Pleural Cavity Updated: 08/16/24 1606    Potassium [[id number]]  (Abnormal) Collected: 08/16/24 1308    Specimen: Blood Updated: 08/16/24 1358     Potassium 3.4 mmol/L     Blood Culture - Blood, Hand, Right [[id number]] Collected: 08/16/24 1308    Specimen: Blood from Hand, Right Updated: 08/16/24 1348    Blood Culture - Blood, Arm, Right [[id number]] Collected: 08/16/24 1308    Specimen: Blood from Arm, Right Updated: 08/16/24 1348    Hemoglobin & Hematocrit, Blood [[id number]]  (Abnormal) Collected: 08/16/24 1308    Specimen: Blood Updated: 08/16/24 1346     Hemoglobin 7.2 g/dL      Hematocrit 25.6 %     Manual Differential [[id number]]  (Abnormal) Collected: 08/16/24 0413    Specimen: Blood Updated: 08/16/24 0551     Neutrophil % 91.0 %      Lymphocyte % 4.0 %      Monocyte % 4.0 %      Eosinophil % 0.0 %      Basophil % 1.0 %      Neutrophils Absolute 7.06 10*3/mm3      Lymphocytes Absolute 0.31 10*3/mm3      Monocytes Absolute 0.31 10*3/mm3      Eosinophils Absolute 0.00 10*3/mm3      Basophils Absolute 0.08 10*3/mm3      Anisocytosis Mod/2+     Basophilic Stippling Slight/1+     Hypochromia Large/3+     Microcytes Slight/1+     Polychromasia Slight/1+     WBC Morphology Normal  "    Giant Platelets Slight/1+    Procalcitonin [[id number]]  (Normal) Collected: 08/16/24 0413    Specimen: Blood Updated: 08/16/24 0544     Procalcitonin 0.17 ng/mL     Narrative:      As a Marker for Sepsis (Non-Neonates):    1. <0.5 ng/mL represents a low risk of severe sepsis and/or septic shock.  2. >2 ng/mL represents a high risk of severe sepsis and/or septic shock.    As a Marker for Lower Respiratory Tract Infections that require antibiotic therapy:    PCT on Admission    Antibiotic Therapy       6-12 Hrs later    >0.5                Strongly Recommended  >0.25 - <0.5        Recommended   0.1 - 0.25          Discouraged              Remeasure/reassess PCT  <0.1                Strongly Discouraged     Remeasure/reassess PCT    As 28 day mortality risk marker: \"Change in Procalcitonin Result\" (>80% or <=80%) if Day 0 (or Day 1) and Day 4 values are available. Refer to http://www.[employer]-pct-calculator.com    Change in PCT <=80%  A decrease of PCT levels below or equal to 80% defines a positive change in PCT test result representing a higher risk for 28-day all-cause mortality of patients diagnosed with severe sepsis for septic shock.    Change in PCT >80%  A decrease of PCT levels of more than 80% defines a negative change in PCT result representing a lower risk for 28-day all-cause mortality of patients diagnosed with severe sepsis or septic shock.       Comprehensive Metabolic Panel [[id number]]  (Abnormal) Collected: 08/16/24 0413    Specimen: Blood Updated: 08/16/24 0544     Glucose 102 mg/dL      BUN 14 mg/dL      Creatinine 0.72 mg/dL      Sodium 137 mmol/L      Potassium 3.1 mmol/L      Chloride 98 mmol/L      CO2 28.0 mmol/L      Calcium 10.4 mg/dL      Total Protein 7.1 g/dL      Albumin 3.2 g/dL      ALT (SGPT) 19 U/L      AST (SGOT) 42 U/L      Alkaline Phosphatase 152 U/L      Total Bilirubin 0.6 mg/dL      Globulin 3.9 gm/dL      A/G Ratio 0.8 g/dL      BUN/Creatinine Ratio 19.4     Anion Gap 11.0 " mmol/L      eGFR 101.4 mL/min/1.73     Narrative:      GFR Normal >60  Chronic Kidney Disease <60  Kidney Failure <15      Magnesium [[id number]]  (Normal) Collected: 08/16/24 0413    Specimen: Blood Updated: 08/16/24 0544     Magnesium 1.7 mg/dL     Phosphorus [[id number]]  (Normal) Collected: 08/16/24 0413    Specimen: Blood Updated: 08/16/24 0544     Phosphorus 3.0 mg/dL     Iron Profile [[id number]]  (Abnormal) Collected: 08/16/24 0413    Specimen: Blood Updated: 08/16/24 0544     Iron 14 mcg/dL      Iron Saturation (TSAT) 3 %      Transferrin 307 mg/dL      TIBC 457 mcg/dL     Ferritin [[id number]]  (Abnormal) Collected: 08/16/24 0413    Specimen: Blood Updated: 08/16/24 0544     Ferritin 29.14 ng/mL     Narrative:      Results may be falsely decreased if patient taking Biotin.      TSH [[id number]]  (Normal) Collected: 08/16/24 0413    Specimen: Blood Updated: 08/16/24 0544     TSH 3.280 uIU/mL     T4, Free [[id number]]  (Normal) Collected: 08/16/24 0413    Specimen: Blood Updated: 08/16/24 0544     Free T4 1.24 ng/dL     CBC & Differential [[id number]]  (Abnormal) Collected: 08/16/24 0413    Specimen: Blood Updated: 08/16/24 0535    Narrative:      The following orders were created for panel order CBC & Differential.  Procedure                               Abnormality         Status                     ---------                               -----------         ------                     CBC Auto Differential[[id number]]        Abnormal            Final result                 Please view results for these tests on the individual orders.    CBC Auto Differential [[id number]]  (Abnormal) Collected: 08/16/24 0413    Specimen: Blood Updated: 08/16/24 0535     WBC 7.76 10*3/mm3      RBC 3.45 10*6/mm3      Hemoglobin 6.6 g/dL      Hematocrit 25.1 %      MCV 72.8 fL      MCH 19.1 pg      MCHC 26.3 g/dL      RDW 23.0 %      RDW-SD 56.5 fl      MPV 9.6 fL      Platelets 213 10*3/mm3     Fentanyl, Urine - Urine, Clean Catch  [[id number]]  (Normal) Collected: 08/16/24 0434    Specimen: Urine, Clean Catch Updated: 08/16/24 0528     Fentanyl, Urine Negative    Narrative:      Negative Threshold:      Fentanyl 5 ng/mL     The normal value for the drug tested is negative. This report includes final unconfirmed screening results to be used for medical treatment purposes only. Unconfirmed results must not be used for non-medical purposes such as employment or legal testing. Clinical consideration should be applied to any drug of abuse test, particularly when unconfirmed results are used.           Protime-INR [[id number]]  (Normal) Collected: 08/16/24 0414    Specimen: Blood Updated: 08/16/24 0527     Protime 13.4 Seconds      INR 0.98    Urine Drug Screen - Urine, Clean Catch [[id number]]  (Normal) Collected: 08/16/24 0434    Specimen: Urine, Clean Catch Updated: 08/16/24 0520     THC, Screen, Urine Negative     Phencyclidine (PCP), Urine Negative     Cocaine Screen, Urine Negative     Methamphetamine, Ur Negative     Opiate Screen Negative     Amphetamine Screen, Urine Negative     Benzodiazepine Screen, Urine Negative     Tricyclic Antidepressants Screen Negative     Methadone Screen, Urine Negative     Barbiturates Screen, Urine Negative     Oxycodone Screen, Urine Negative     Buprenorphine, Screen, Urine Negative    Narrative:      Cutoff For Drugs Screened:    Amphetamines               500 ng/ml  Barbiturates               200 ng/ml  Benzodiazepines            150 ng/ml  Cocaine                    150 ng/ml  Methadone                  200 ng/ml  Opiates                    100 ng/ml  Phencyclidine               25 ng/ml  THC                         50 ng/ml  Methamphetamine            500 ng/ml  Tricyclic Antidepressants  300 ng/ml  Oxycodone                  100 ng/ml  Buprenorphine               10 ng/ml    The normal value for all drugs tested is negative. This report includes unconfirmed screening results, with the cutoff values  listed, to be used for medical treatment purposes only.  Unconfirmed results must not be used for non-medical purposes such as employment or legal testing.  Clinical consideration should be applied to any drug of abuse test, particularly when unconfirmed results are used.                CXR: Right pigtail catheter in good position. No change in right pleural effusion.       Assessment & Plan   Pleural effusion, right - S/P insertion of right pigtail catheter by IR. Will start alteplase via pigtail catheter. Monitor chest tube output closely.     Follow pleural cultures  Monitor chest tube output with alteplase.   Repeat CXR tomorrow.       Sobeida Delgado, APRN

## 2024-08-17 NOTE — NURSING NOTE
0700 Received report from nightshift RN. Patient is resting in the bed with eyes closed. Patient has chest tube, C/D/I. There has been zero output in atrium overnight. Will monitor patient and continue with plan of care.     0900 Patient denies pain at this time. Patient is able to make needs known. Medications given as scheduled. See MAR. Patient has call light within reach and able to make needs known. Breath sounds are clear through out.     1000. Patient continues to have zero output in the chest tube, flushed by MD.     1030 Patient was given Altaplase through chest tube. Patient had swelling around the insert site. MD made aware. Ordered to not administer any more medication at this time.  Protcol was completed with rotation of the body and unclamped. Zero output was noted. MD made aware.     1400 Patient still has zero output. MD made aware once more and ordered to hold the 2100 dose of medication, and to continue with q8 flushes. Hold was put on 2100 dose. Will pass on to night shift.     1600 Swelling at insert site has disappeared at this time. No fluid in the chest tube atrium. Patient denies pain. Breath sounds remain clear.

## 2024-08-18 ENCOUNTER — APPOINTMENT (OUTPATIENT)
Dept: GENERAL RADIOLOGY | Facility: HOSPITAL | Age: 64
End: 2024-08-18
Payer: MEDICARE

## 2024-08-18 LAB
ANION GAP SERPL CALCULATED.3IONS-SCNC: 12 MMOL/L (ref 5–15)
BUN SERPL-MCNC: 11 MG/DL (ref 8–23)
BUN/CREAT SERPL: 15.5 (ref 7–25)
CALCIUM SPEC-SCNC: 8.8 MG/DL (ref 8.6–10.5)
CHLORIDE SERPL-SCNC: 101 MMOL/L (ref 98–107)
CO2 SERPL-SCNC: 22 MMOL/L (ref 22–29)
CREAT SERPL-MCNC: 0.71 MG/DL (ref 0.76–1.27)
DEPRECATED RDW RBC AUTO: 61.2 FL (ref 37–54)
EGFRCR SERPLBLD CKD-EPI 2021: 101.8 ML/MIN/1.73
ERYTHROCYTE [DISTWIDTH] IN BLOOD BY AUTOMATED COUNT: 23.9 % (ref 12.3–15.4)
GLUCOSE SERPL-MCNC: 95 MG/DL (ref 65–99)
HCT VFR BLD AUTO: 29.5 % (ref 37.5–51)
HGB BLD-MCNC: 8.1 G/DL (ref 13–17.7)
MAGNESIUM SERPL-MCNC: 1.6 MG/DL (ref 1.6–2.4)
MCH RBC QN AUTO: 20.4 PG (ref 26.6–33)
MCHC RBC AUTO-ENTMCNC: 27.5 G/DL (ref 31.5–35.7)
MCV RBC AUTO: 74.3 FL (ref 79–97)
PLATELET # BLD AUTO: 281 10*3/MM3 (ref 140–450)
PMV BLD AUTO: 9.9 FL (ref 6–12)
POTASSIUM SERPL-SCNC: 3.3 MMOL/L (ref 3.5–5.2)
POTASSIUM SERPL-SCNC: 4.2 MMOL/L (ref 3.5–5.2)
RBC # BLD AUTO: 3.97 10*6/MM3 (ref 4.14–5.8)
SODIUM SERPL-SCNC: 135 MMOL/L (ref 136–145)
WBC NRBC COR # BLD AUTO: 12.12 10*3/MM3 (ref 3.4–10.8)

## 2024-08-18 PROCEDURE — 32557 INSERT CATH PLEURA W/ IMAGE: CPT | Performed by: SURGERY

## 2024-08-18 PROCEDURE — 25810000003 SODIUM CHLORIDE 0.9 % SOLUTION: Performed by: INTERNAL MEDICINE

## 2024-08-18 PROCEDURE — 84132 ASSAY OF SERUM POTASSIUM: CPT | Performed by: FAMILY MEDICINE

## 2024-08-18 PROCEDURE — 80048 BASIC METABOLIC PNL TOTAL CA: CPT | Performed by: FAMILY MEDICINE

## 2024-08-18 PROCEDURE — 99232 SBSQ HOSP IP/OBS MODERATE 35: CPT | Performed by: INTERNAL MEDICINE

## 2024-08-18 PROCEDURE — 25010000002 MAGNESIUM SULFATE 2 GM/50ML SOLUTION: Performed by: FAMILY MEDICINE

## 2024-08-18 PROCEDURE — 25010000002 LIDOCAINE 1 % SOLUTION: Performed by: SURGERY

## 2024-08-18 PROCEDURE — 0WJ83ZZ INSPECTION OF CHEST WALL, PERCUTANEOUS APPROACH: ICD-10-PCS | Performed by: SURGERY

## 2024-08-18 PROCEDURE — 83735 ASSAY OF MAGNESIUM: CPT | Performed by: NURSE PRACTITIONER

## 2024-08-18 PROCEDURE — 99232 SBSQ HOSP IP/OBS MODERATE 35: CPT | Performed by: SURGERY

## 2024-08-18 PROCEDURE — 85027 COMPLETE CBC AUTOMATED: CPT | Performed by: FAMILY MEDICINE

## 2024-08-18 PROCEDURE — 71045 X-RAY EXAM CHEST 1 VIEW: CPT

## 2024-08-18 PROCEDURE — 25010000002 LIDOCAINE 1 % SOLUTION

## 2024-08-18 PROCEDURE — 25010000002 PIPERACILLIN SOD-TAZOBACTAM PER 1 G: Performed by: INTERNAL MEDICINE

## 2024-08-18 PROCEDURE — 25010000002 NA FERRIC GLUC CPLX PER 12.5 MG: Performed by: INTERNAL MEDICINE

## 2024-08-18 RX ORDER — POTASSIUM CHLORIDE 750 MG/1
20 CAPSULE, EXTENDED RELEASE ORAL 2 TIMES DAILY WITH MEALS
Status: DISCONTINUED | OUTPATIENT
Start: 2024-08-18 | End: 2024-08-24

## 2024-08-18 RX ORDER — LIDOCAINE HYDROCHLORIDE 10 MG/ML
10 INJECTION, SOLUTION INFILTRATION; PERINEURAL ONCE
Status: COMPLETED | OUTPATIENT
Start: 2024-08-18 | End: 2024-08-18

## 2024-08-18 RX ORDER — POTASSIUM CHLORIDE 750 MG/1
40 CAPSULE, EXTENDED RELEASE ORAL EVERY 4 HOURS
Status: COMPLETED | OUTPATIENT
Start: 2024-08-18 | End: 2024-08-18

## 2024-08-18 RX ORDER — LIDOCAINE HYDROCHLORIDE 10 MG/ML
INJECTION, SOLUTION INFILTRATION; PERINEURAL
Status: COMPLETED
Start: 2024-08-18 | End: 2024-08-18

## 2024-08-18 RX ORDER — NICOTINE 21 MG/24HR
1 PATCH, TRANSDERMAL 24 HOURS TRANSDERMAL
Status: DISCONTINUED | OUTPATIENT
Start: 2024-08-18 | End: 2024-08-28 | Stop reason: HOSPADM

## 2024-08-18 RX ORDER — MAGNESIUM SULFATE HEPTAHYDRATE 40 MG/ML
2 INJECTION, SOLUTION INTRAVENOUS ONCE
Status: COMPLETED | OUTPATIENT
Start: 2024-08-18 | End: 2024-08-18

## 2024-08-18 RX ORDER — FAMOTIDINE 20 MG/1
20 TABLET, FILM COATED ORAL
Status: DISCONTINUED | OUTPATIENT
Start: 2024-08-18 | End: 2024-08-28 | Stop reason: HOSPADM

## 2024-08-18 RX ADMIN — NICOTINE 1 PATCH: 21 PATCH, EXTENDED RELEASE TRANSDERMAL at 13:03

## 2024-08-18 RX ADMIN — ANTACID TABLETS 2 TABLET: 500 TABLET, CHEWABLE ORAL at 09:10

## 2024-08-18 RX ADMIN — Medication 10 ML: at 08:43

## 2024-08-18 RX ADMIN — MAGNESIUM SULFATE HEPTAHYDRATE 2 G: 2 INJECTION, SOLUTION INTRAVENOUS at 14:10

## 2024-08-18 RX ADMIN — LIDOCAINE HYDROCHLORIDE: 10 INJECTION, SOLUTION INFILTRATION; PERINEURAL at 09:14

## 2024-08-18 RX ADMIN — PIPERACILLIN AND TAZOBACTAM 4.5 G: 4; .5 INJECTION, POWDER, FOR SOLUTION INTRAVENOUS at 17:00

## 2024-08-18 RX ADMIN — POTASSIUM CHLORIDE 40 MEQ: 750 CAPSULE, EXTENDED RELEASE ORAL at 17:01

## 2024-08-18 RX ADMIN — LIDOCAINE HYDROCHLORIDE 10 ML: 10 INJECTION, SOLUTION INFILTRATION; PERINEURAL at 09:14

## 2024-08-18 RX ADMIN — SODIUM CHLORIDE 75 ML/HR: 9 INJECTION, SOLUTION INTRAVENOUS at 20:59

## 2024-08-18 RX ADMIN — PIPERACILLIN AND TAZOBACTAM 4.5 G: 4; .5 INJECTION, POWDER, FOR SOLUTION INTRAVENOUS at 01:10

## 2024-08-18 RX ADMIN — POTASSIUM CHLORIDE 40 MEQ: 750 CAPSULE, EXTENDED RELEASE ORAL at 11:53

## 2024-08-18 RX ADMIN — POTASSIUM CHLORIDE 20 MEQ: 750 CAPSULE, EXTENDED RELEASE ORAL at 08:43

## 2024-08-18 RX ADMIN — FAMOTIDINE 20 MG: 10 INJECTION INTRAVENOUS at 08:43

## 2024-08-18 RX ADMIN — PIPERACILLIN AND TAZOBACTAM 4.5 G: 4; .5 INJECTION, POWDER, FOR SOLUTION INTRAVENOUS at 08:43

## 2024-08-18 RX ADMIN — SODIUM CHLORIDE 250 MG: 9 INJECTION, SOLUTION INTRAVENOUS at 11:53

## 2024-08-18 RX ADMIN — POTASSIUM CHLORIDE 20 MEQ: 750 CAPSULE, EXTENDED RELEASE ORAL at 17:01

## 2024-08-18 RX ADMIN — Medication 10 ML: at 20:27
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
## 2024-08-18 NOTE — NURSING NOTE
At approx 0530 this morning entered pts room to pedrito chest tube output and discovered bubbling in water seal.  After checking all connections, noticed transparent adhesive dressing on pt was slightly lifted in one corner.  Attempted to reseal with tegaderm, however bubbling persisted.  Dr. Manzanares notified, no new orders.

## 2024-08-18 NOTE — PROGRESS NOTES
"    [facility] Cardiothoracic Surgery  PROGRESS NOTE       S/P IR insertion right pigtail catheter on 08/16/2024     Subjective:  Sitting on side of bed eating breakfast. Complaining of heartburn/fullness - asking nurse for TUMS.       Objective:      /81 (BP Location: Right arm, Patient Position: Lying)   Pulse 85   Temp 97.5 °F (36.4 °C) (Tympanic)   Resp 18   Ht 188 cm (74\")   Wt 77.9 kg (171 lb 12.8 oz)   SpO2 98%   BMI 22.06 kg/m²       Intake/Output Summary (Last 24 hours) at 8/18/2024 1005  Last data filed at 8/18/2024 0550  Gross per 24 hour   Intake 200 ml   Output 387 ml   Net -187 ml       CT Output:   Right pigtail catheter to -20 cm suction with 87 ml serosanguinous drainage in the past 24 hours. No air leak.     PE:  Vitals:    08/18/24 0801   BP: 121/81   Pulse: 85   Resp: 18   Temp: 97.5 °F (36.4 °C)   SpO2: 98%       GENERAL: NAD, resting comfortably, normal color  CARDIOVASCULAR: Normal HT with RRR. No murmurs, gallops or rubs.   PULMONARY: No labored breathing. Clear, but diminished bilateral breath sounds R>L. No wheezes or rhonchi.   ABDOMEN: Soft, non-tender/non-distended with active bowel sounds.  EXTREMITIES: No clubbing or cyanosis. Left BKA. No peripheral edema - right leg. Palpable pedal pulses on right.       Lab Results (last 72 hours)       Procedure Component Value Units Date/Time    Magnesium [[id number]]  (Normal) Collected: 08/18/24 0413    Specimen: Blood Updated: 08/18/24 0759     Magnesium 1.6 mg/dL     Basic Metabolic Panel [[id number]]  (Abnormal) Collected: 08/18/24 0413    Specimen: Blood Updated: 08/18/24 0510     Glucose 95 mg/dL      BUN 11 mg/dL      Creatinine 0.71 mg/dL      Sodium 135 mmol/L      Potassium 3.3 mmol/L      Chloride 101 mmol/L      CO2 22.0 mmol/L      Calcium 8.8 mg/dL      BUN/Creatinine Ratio 15.5     Anion Gap 12.0 mmol/L      eGFR 101.8 mL/min/1.73     Narrative:      GFR Normal >60  Chronic Kidney Disease <60  Kidney " Failure <15      CBC (No Diff) [[id number]]  (Abnormal) Collected: 08/18/24 0413    Specimen: Blood Updated: 08/18/24 0453     WBC 12.12 10*3/mm3      RBC 3.97 10*6/mm3      Hemoglobin 8.1 g/dL      Hematocrit 29.5 %      MCV 74.3 fL      MCH 20.4 pg      MCHC 27.5 g/dL      RDW 23.9 %      RDW-SD 61.2 fl      MPV 9.9 fL      Platelets 281 10*3/mm3     Non-gynecologic Cytology [[id number]] Collected: 08/16/24 1550    Specimen: Body Fluid from Lung Updated: 08/17/24 1432    Blood Culture - Blood, Hand, Right [[id number]]  (Normal) Collected: 08/16/24 1308    Specimen: Blood from Hand, Right Updated: 08/17/24 1400     Blood Culture No growth at 24 hours    Blood Culture - Blood, Arm, Right [[id number]]  (Normal) Collected: 08/16/24 1308    Specimen: Blood from Arm, Right Updated: 08/17/24 1400     Blood Culture No growth at 24 hours    Body Fluid Culture - Body Fluid, Pleural Cavity [[id number]] Collected: 08/16/24 1550    Specimen: Body Fluid from Pleural Cavity Updated: 08/17/24 1037     Body Fluid Culture No growth     Gram Stain Many (4+) WBCs seen      No organisms seen    Hemoglobin A1c [[id number]]  (Normal) Collected: 08/17/24 0408    Specimen: Blood Updated: 08/17/24 0519     Hemoglobin A1C 5.00 %     Narrative:      Hemoglobin A1C Ranges:    Increased Risk for Diabetes  5.7% to 6.4%  Diabetes                     >= 6.5%  Diabetic Goal                < 7.0%    Comprehensive Metabolic Panel [[id number]]  (Abnormal) Collected: 08/17/24 0409    Specimen: Blood Updated: 08/17/24 0450     Glucose 82 mg/dL      BUN 11 mg/dL      Creatinine 0.70 mg/dL      Sodium 136 mmol/L      Potassium 3.5 mmol/L      Chloride 100 mmol/L      CO2 24.0 mmol/L      Calcium 9.5 mg/dL      Total Protein 6.6 g/dL      Albumin 2.9 g/dL      ALT (SGPT) 21 U/L      AST (SGOT) 43 U/L      Alkaline Phosphatase 158 U/L      Total Bilirubin 0.7 mg/dL      Globulin 3.7 gm/dL      A/G Ratio 0.8 g/dL      BUN/Creatinine Ratio 15.7     Anion Gap 12.0  mmol/L      eGFR 102.3 mL/min/1.73     Narrative:      GFR Normal >60  Chronic Kidney Disease <60  Kidney Failure <15      Lipid Panel [[id number]] Collected: 08/17/24 0409    Specimen: Blood Updated: 08/17/24 0450     Total Cholesterol 100 mg/dL      Triglycerides 72 mg/dL      HDL Cholesterol 45 mg/dL      LDL Cholesterol  40 mg/dL      VLDL Cholesterol 15 mg/dL      LDL/HDL Ratio 0.90    Narrative:      Cholesterol Reference Ranges  (U.[place] Department of Health and Human Services ATP III Classifications)    Desirable          <200 mg/dL  Borderline High    200-239 mg/dL  High Risk          >240 mg/dL      Triglyceride Reference Ranges  (U.S. Department of Health and Human Services ATP III Classifications)    Normal           <150 mg/dL  Borderline High  150-199 mg/dL  High             200-499 mg/dL  Very High        >500 mg/dL    HDL Reference Ranges  (U.S. Department of Health and Human Services ATP III Classifications)    Low     <40 mg/dl (major risk factor for CHD)  High    >60 mg/dl ('negative' risk factor for CHD)        LDL Reference Ranges  (U.S. Department of Health and Human Services ATP III Classifications)    Optimal          <100 mg/dL  Near Optimal     100-129 mg/dL  Borderline High  130-159 mg/dL  High             160-189 mg/dL  Very High        >189 mg/dL    CBC & Differential [[id number]]  (Abnormal) Collected: 08/17/24 0408    Specimen: Blood Updated: 08/17/24 0431    Narrative:      The following orders were created for panel order CBC & Differential.  Procedure                               Abnormality         Status                     ---------                               -----------         ------                     CBC Auto Differential[[id number]]        Abnormal            Final result                 Please view results for these tests on the individual orders.    CBC Auto Differential [[id number]]  (Abnormal) Collected: 08/17/24 0408    Specimen: Blood Updated: 08/17/24 0431     WBC 8.08  10*3/mm3      RBC 3.90 10*6/mm3      Hemoglobin 7.8 g/dL      Hematocrit 28.5 %      MCV 73.1 fL      MCH 20.0 pg      MCHC 27.4 g/dL      RDW 23.4 %      RDW-SD 58.2 fl      MPV 9.1 fL      Platelets 190 10*3/mm3      Neutrophil % 81.8 %      Lymphocyte % 7.8 %      Monocyte % 8.8 %      Eosinophil % 0.7 %      Basophil % 0.4 %      Immature Grans % 0.5 %      Neutrophils, Absolute 6.61 10*3/mm3      Lymphocytes, Absolute 0.63 10*3/mm3      Monocytes, Absolute 0.71 10*3/mm3      Eosinophils, Absolute 0.06 10*3/mm3      Basophils, Absolute 0.03 10*3/mm3      Immature Grans, Absolute 0.04 10*3/mm3     Prealbumin [[id number]]  (Abnormal) Collected: 08/16/24 1308    Specimen: Blood Updated: 08/16/24 1948     Prealbumin 10.1 mg/dL     Body Fluid Cell Count With Differential - Pleural Fluid, Pleural Cavity [[id number]]  (Abnormal) Collected: 08/16/24 1550    Specimen: Pleural Fluid from Pleural Cavity Updated: 08/16/24 1813    Narrative:      The following orders were created for panel order Body Fluid Cell Count With Differential - Pleural Fluid, Pleural Cavity.  Procedure                               Abnormality         Status                     ---------                               -----------         ------                     Body fluid cell count - ...[[id number]]  Abnormal            Final result               Body fluid differential ...[[id number]]                      Final result                 Please view results for these tests on the individual orders.    Body fluid differential - Pleural Fluid, Pleural Cavity [[id number]] Collected: 08/16/24 1550    Specimen: Pleural Fluid from Pleural Cavity Updated: 08/16/24 1813     Neutrophils, Fluid 97 %      Lymphocytes, Fluid 3 %     Body fluid cell count - Pleural Fluid, Pleural Cavity [[id number]]  (Abnormal) Collected: 08/16/24 1550    Specimen: Pleural Fluid from Pleural Cavity Updated: 08/16/24 1724     Color, Fluid Other     Comment: Light brown         Appearance, Fluid Turbid     RBC, Fluid 140,000 /mm3      Comment: RBC count obtained from 1:5 dilution        Nucleated Cells, Fluid 130,400 /mm3      Comment: WBC count obtained from 1:100 dilution        Method: Automated Sysmex XN Method    Anaerobic Culture - Body Fluid, Pleural Cavity [[id number]] Collected: 08/16/24 1550    Specimen: Body Fluid from Pleural Cavity Updated: 08/16/24 1606    Potassium [[id number]]  (Abnormal) Collected: 08/16/24 1308    Specimen: Blood Updated: 08/16/24 1358     Potassium 3.4 mmol/L     Hemoglobin & Hematocrit, Blood [[id number]]  (Abnormal) Collected: 08/16/24 1308    Specimen: Blood Updated: 08/16/24 1346     Hemoglobin 7.2 g/dL      Hematocrit 25.6 %     Manual Differential [[id number]]  (Abnormal) Collected: 08/16/24 0413    Specimen: Blood Updated: 08/16/24 0551     Neutrophil % 91.0 %      Lymphocyte % 4.0 %      Monocyte % 4.0 %      Eosinophil % 0.0 %      Basophil % 1.0 %      Neutrophils Absolute 7.06 10*3/mm3      Lymphocytes Absolute 0.31 10*3/mm3      Monocytes Absolute 0.31 10*3/mm3      Eosinophils Absolute 0.00 10*3/mm3      Basophils Absolute 0.08 10*3/mm3      Anisocytosis Mod/2+     Basophilic Stippling Slight/1+     Hypochromia Large/3+     Microcytes Slight/1+     Polychromasia Slight/1+     WBC Morphology Normal     Giant Platelets Slight/1+    Procalcitonin [[id number]]  (Normal) Collected: 08/16/24 0413    Specimen: Blood Updated: 08/16/24 0544     Procalcitonin 0.17 ng/mL     Narrative:      As a Marker for Sepsis (Non-Neonates):    1. <0.5 ng/mL represents a low risk of severe sepsis and/or septic shock.  2. >2 ng/mL represents a high risk of severe sepsis and/or septic shock.    As a Marker for Lower Respiratory Tract Infections that require antibiotic therapy:    PCT on Admission    Antibiotic Therapy       6-12 Hrs later    >0.5                Strongly Recommended  >0.25 - <0.5        Recommended   0.1 - 0.25          Discouraged               "Remeasure/reassess PCT  <0.1                Strongly Discouraged     Remeasure/reassess PCT    As 28 day mortality risk marker: \"Change in Procalcitonin Result\" (>80% or <=80%) if Day 0 (or Day 1) and Day 4 values are available. Refer to [email or web address]    Change in PCT <=80%  A decrease of PCT levels below or equal to 80% defines a positive change in PCT test result representing a higher risk for 28-day all-cause mortality of patients diagnosed with severe sepsis for septic shock.    Change in PCT >80%  A decrease of PCT levels of more than 80% defines a negative change in PCT result representing a lower risk for 28-day all-cause mortality of patients diagnosed with severe sepsis or septic shock.       Comprehensive Metabolic Panel [[id number]]  (Abnormal) Collected: 08/16/24 0413    Specimen: Blood Updated: 08/16/24 0544     Glucose 102 mg/dL      BUN 14 mg/dL      Creatinine 0.72 mg/dL      Sodium 137 mmol/L      Potassium 3.1 mmol/L      Chloride 98 mmol/L      CO2 28.0 mmol/L      Calcium 10.4 mg/dL      Total Protein 7.1 g/dL      Albumin 3.2 g/dL      ALT (SGPT) 19 U/L      AST (SGOT) 42 U/L      Alkaline Phosphatase 152 U/L      Total Bilirubin 0.6 mg/dL      Globulin 3.9 gm/dL      A/G Ratio 0.8 g/dL      BUN/Creatinine Ratio 19.4     Anion Gap 11.0 mmol/L      eGFR 101.4 mL/min/1.73     Narrative:      GFR Normal >60  Chronic Kidney Disease <60  Kidney Failure <15      Magnesium [[id number]]  (Normal) Collected: 08/16/24 0413    Specimen: Blood Updated: 08/16/24 0544     Magnesium 1.7 mg/dL     Phosphorus [[id number]]  (Normal) Collected: 08/16/24 0413    Specimen: Blood Updated: 08/16/24 0544     Phosphorus 3.0 mg/dL     Iron Profile [[id number]]  (Abnormal) Collected: 08/16/24 0413    Specimen: Blood Updated: 08/16/24 0544     Iron 14 mcg/dL      Iron Saturation (TSAT) 3 %      Transferrin 307 mg/dL      TIBC 457 mcg/dL     Ferritin [[id number]]  (Abnormal) Collected: 08/16/24 0413    " Specimen: Blood Updated: 08/16/24 0544     Ferritin 29.14 ng/mL     Narrative:      Results may be falsely decreased if patient taking Biotin.      TSH [[id number]]  (Normal) Collected: 08/16/24 0413    Specimen: Blood Updated: 08/16/24 0544     TSH 3.280 uIU/mL     T4, Free [[id number]]  (Normal) Collected: 08/16/24 0413    Specimen: Blood Updated: 08/16/24 0544     Free T4 1.24 ng/dL     CBC & Differential [[id number]]  (Abnormal) Collected: 08/16/24 0413    Specimen: Blood Updated: 08/16/24 0535    Narrative:      The following orders were created for panel order CBC & Differential.  Procedure                               Abnormality         Status                     ---------                               -----------         ------                     CBC Auto Differential[[id number]]        Abnormal            Final result                 Please view results for these tests on the individual orders.    CBC Auto Differential [[id number]]  (Abnormal) Collected: 08/16/24 0413    Specimen: Blood Updated: 08/16/24 0535     WBC 7.76 10*3/mm3      RBC 3.45 10*6/mm3      Hemoglobin 6.6 g/dL      Hematocrit 25.1 %      MCV 72.8 fL      MCH 19.1 pg      MCHC 26.3 g/dL      RDW 23.0 %      RDW-SD 56.5 fl      MPV 9.6 fL      Platelets 213 10*3/mm3     Fentanyl, Urine - Urine, Clean Catch [[id number]]  (Normal) Collected: 08/16/24 0434    Specimen: Urine, Clean Catch Updated: 08/16/24 0528     Fentanyl, Urine Negative    Narrative:      Negative Threshold:      Fentanyl 5 ng/mL     The normal value for the drug tested is negative. This report includes final unconfirmed screening results to be used for medical treatment purposes only. Unconfirmed results must not be used for non-medical purposes such as employment or legal testing. Clinical consideration should be applied to any drug of abuse test, particularly when unconfirmed results are used.           Protime-INR [[id number]]  (Normal) Collected: 08/16/24 0414    Specimen:  Blood Updated: 08/16/24 0527     Protime 13.4 Seconds      INR 0.98    Urine Drug Screen - Urine, Clean Catch [[id number]]  (Normal) Collected: 08/16/24 0434    Specimen: Urine, Clean Catch Updated: 08/16/24 0520     THC, Screen, Urine Negative     Phencyclidine (PCP), Urine Negative     Cocaine Screen, Urine Negative     Methamphetamine, Ur Negative     Opiate Screen Negative     Amphetamine Screen, Urine Negative     Benzodiazepine Screen, Urine Negative     Tricyclic Antidepressants Screen Negative     Methadone Screen, Urine Negative     Barbiturates Screen, Urine Negative     Oxycodone Screen, Urine Negative     Buprenorphine, Screen, Urine Negative    Narrative:      Cutoff For Drugs Screened:    Amphetamines               500 ng/ml  Barbiturates               200 ng/ml  Benzodiazepines            150 ng/ml  Cocaine                    150 ng/ml  Methadone                  200 ng/ml  Opiates                    100 ng/ml  Phencyclidine               25 ng/ml  THC                         50 ng/ml  Methamphetamine            500 ng/ml  Tricyclic Antidepressants  300 ng/ml  Oxycodone                  100 ng/ml  Buprenorphine               10 ng/ml    The normal value for all drugs tested is negative. This report includes unconfirmed screening results, with the cutoff values listed, to be used for medical treatment purposes only.  Unconfirmed results must not be used for non-medical purposes such as employment or legal testing.  Clinical consideration should be applied to any drug of abuse test, particularly when unconfirmed results are used.                CXR: Right pigtail catheter pulled out of pleural space.       Assessment & Plan   Pleural effusion, right - S/P insertion of right pigtail catheter by IR.     Pigtail catheter pulled back out of pleural space. Will removed and replace. Procedure explained to patient, who is in agreement.     Attempt for insertion of right-sided pigtail catheter unsuccessful. Will  ask IR to place tomorrow morning. Patient will be NPO after midnight.       KRIS Starks

## 2024-08-18 NOTE — PLAN OF CARE
Goal Outcome Evaluation:           Progress: no change  Outcome Evaluation: VSS NSR 82-90 per tele.  Denies pain nausea or sob this shift.  Chest tube CDI, has had some sanguinous output this shift, lytics held per orders.  IV abx per MAR.  Will continue to monitor.

## 2024-08-18 NOTE — PROGRESS NOTES
Automatic IV to PO Pharmacy Note - General    Ravin Garza is a 64 y.o. male who meets the following criteria for IV to PO therapy conversion :     Tolerating oral fluids or 40ml/hour of enteral nutrition and oral route not otherwise compromised  Receiving other oral medications on a scheduled basis    Assessment/Plan  Based on this criteria, Famotidine 20 mg IV BID has been changed to Famotidine 20 mg PO BIDac per the directives and guidelines established by [facility] The[facility] Committee and [facility] Executive Committee .       Lamont Stevenson, PharmD  08/18/2417:37 CDT
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
## 2024-08-19 ENCOUNTER — APPOINTMENT (OUTPATIENT)
Dept: GENERAL RADIOLOGY | Facility: HOSPITAL | Age: 64
End: 2024-08-19
Payer: MEDICARE

## 2024-08-19 ENCOUNTER — APPOINTMENT (OUTPATIENT)
Dept: CT IMAGING | Facility: HOSPITAL | Age: 64
End: 2024-08-19
Payer: MEDICARE

## 2024-08-19 PROBLEM — J86.9 EMPYEMA LUNG: Status: ACTIVE | Noted: 2024-08-19

## 2024-08-19 LAB
ANION GAP SERPL CALCULATED.3IONS-SCNC: 12 MMOL/L (ref 5–15)
BUN SERPL-MCNC: 10 MG/DL (ref 8–23)
BUN/CREAT SERPL: 14.9 (ref 7–25)
CALCIUM SPEC-SCNC: 8.4 MG/DL (ref 8.6–10.5)
CHLORIDE SERPL-SCNC: 102 MMOL/L (ref 98–107)
CO2 SERPL-SCNC: 19 MMOL/L (ref 22–29)
CREAT SERPL-MCNC: 0.67 MG/DL (ref 0.76–1.27)
DEPRECATED RDW RBC AUTO: 64 FL (ref 37–54)
EGFRCR SERPLBLD CKD-EPI 2021: 103.6 ML/MIN/1.73
ERYTHROCYTE [DISTWIDTH] IN BLOOD BY AUTOMATED COUNT: 25.3 % (ref 12.3–15.4)
GLUCOSE SERPL-MCNC: 98 MG/DL (ref 65–99)
HCT VFR BLD AUTO: 29.2 % (ref 37.5–51)
HGB BLD-MCNC: 8 G/DL (ref 13–17.7)
MAGNESIUM SERPL-MCNC: 1.8 MG/DL (ref 1.6–2.4)
MCH RBC QN AUTO: 20.8 PG (ref 26.6–33)
MCHC RBC AUTO-ENTMCNC: 27.4 G/DL (ref 31.5–35.7)
MCV RBC AUTO: 75.8 FL (ref 79–97)
PLATELET # BLD AUTO: 378 10*3/MM3 (ref 140–450)
PMV BLD AUTO: 9.4 FL (ref 6–12)
POTASSIUM SERPL-SCNC: 4.2 MMOL/L (ref 3.5–5.2)
RBC # BLD AUTO: 3.85 10*6/MM3 (ref 4.14–5.8)
SODIUM SERPL-SCNC: 133 MMOL/L (ref 136–145)
WBC NRBC COR # BLD AUTO: 12.29 10*3/MM3 (ref 3.4–10.8)

## 2024-08-19 PROCEDURE — 99232 SBSQ HOSP IP/OBS MODERATE 35: CPT | Performed by: SURGERY

## 2024-08-19 PROCEDURE — 25010000002 LIDOCAINE 1 % SOLUTION: Performed by: RADIOLOGY

## 2024-08-19 PROCEDURE — 0W9930Z DRAINAGE OF RIGHT PLEURAL CAVITY WITH DRAINAGE DEVICE, PERCUTANEOUS APPROACH: ICD-10-PCS | Performed by: RADIOLOGY

## 2024-08-19 PROCEDURE — 85027 COMPLETE CBC AUTOMATED: CPT | Performed by: FAMILY MEDICINE

## 2024-08-19 PROCEDURE — 25010000002 PIPERACILLIN SOD-TAZOBACTAM PER 1 G: Performed by: INTERNAL MEDICINE

## 2024-08-19 PROCEDURE — 25010000002 FENTANYL CITRATE (PF) 50 MCG/ML SOLUTION: Performed by: RADIOLOGY

## 2024-08-19 PROCEDURE — 25010000002 ALTEPLASE 2 MG RECONSTITUTED SOLUTION 1 EACH VIAL: Performed by: NURSE PRACTITIONER

## 2024-08-19 PROCEDURE — 25010000002 MIDAZOLAM PER 1MG: Performed by: RADIOLOGY

## 2024-08-19 PROCEDURE — 80048 BASIC METABOLIC PNL TOTAL CA: CPT | Performed by: FAMILY MEDICINE

## 2024-08-19 PROCEDURE — 99232 SBSQ HOSP IP/OBS MODERATE 35: CPT | Performed by: INTERNAL MEDICINE

## 2024-08-19 PROCEDURE — 83735 ASSAY OF MAGNESIUM: CPT | Performed by: FAMILY MEDICINE

## 2024-08-19 PROCEDURE — 71045 X-RAY EXAM CHEST 1 VIEW: CPT

## 2024-08-19 RX ORDER — FENTANYL CITRATE 50 UG/ML
INJECTION, SOLUTION INTRAMUSCULAR; INTRAVENOUS AS NEEDED
Status: COMPLETED | OUTPATIENT
Start: 2024-08-19 | End: 2024-08-19

## 2024-08-19 RX ORDER — LIDOCAINE HYDROCHLORIDE 10 MG/ML
INJECTION, SOLUTION INFILTRATION; PERINEURAL AS NEEDED
Status: COMPLETED | OUTPATIENT
Start: 2024-08-19 | End: 2024-08-19

## 2024-08-19 RX ORDER — MIDAZOLAM HYDROCHLORIDE 2 MG/2ML
INJECTION, SOLUTION INTRAMUSCULAR; INTRAVENOUS AS NEEDED
Status: COMPLETED | OUTPATIENT
Start: 2024-08-19 | End: 2024-08-19

## 2024-08-19 RX ORDER — FERROUS SULFATE 325(65) MG
325 TABLET ORAL
Status: DISCONTINUED | OUTPATIENT
Start: 2024-08-20 | End: 2024-08-28 | Stop reason: HOSPADM

## 2024-08-19 RX ADMIN — POTASSIUM CHLORIDE 20 MEQ: 750 CAPSULE, EXTENDED RELEASE ORAL at 17:47

## 2024-08-19 RX ADMIN — Medication 10 ML: at 21:24

## 2024-08-19 RX ADMIN — FAMOTIDINE 20 MG: 20 TABLET, FILM COATED ORAL at 17:47

## 2024-08-19 RX ADMIN — LIDOCAINE HYDROCHLORIDE 10 ML: 10 INJECTION, SOLUTION INFILTRATION; PERINEURAL at 10:21

## 2024-08-19 RX ADMIN — FENTANYL CITRATE 50 MCG: 50 INJECTION, SOLUTION INTRAMUSCULAR; INTRAVENOUS at 10:12

## 2024-08-19 RX ADMIN — ALTEPLASE 10 MG: 2.2 INJECTION, POWDER, LYOPHILIZED, FOR SOLUTION INTRAVENOUS at 21:20

## 2024-08-19 RX ADMIN — DORNASE ALFA 5 MG: 1 SOLUTION RESPIRATORY (INHALATION) at 21:22

## 2024-08-19 RX ADMIN — MIDAZOLAM HYDROCHLORIDE 0.5 MG: 1 INJECTION, SOLUTION INTRAMUSCULAR; INTRAVENOUS at 10:12

## 2024-08-19 RX ADMIN — DORNASE ALFA 5 MG: 1 SOLUTION RESPIRATORY (INHALATION) at 13:06

## 2024-08-19 RX ADMIN — PIPERACILLIN AND TAZOBACTAM 4.5 G: 4; .5 INJECTION, POWDER, FOR SOLUTION INTRAVENOUS at 01:32

## 2024-08-19 RX ADMIN — ALTEPLASE 10 MG: 2.2 INJECTION, POWDER, LYOPHILIZED, FOR SOLUTION INTRAVENOUS at 13:06

## 2024-08-19 RX ADMIN — PIPERACILLIN AND TAZOBACTAM 4.5 G: 4; .5 INJECTION, POWDER, FOR SOLUTION INTRAVENOUS at 17:46

## 2024-08-19 RX ADMIN — LIDOCAINE HYDROCHLORIDE 10 ML: 10 INJECTION, SOLUTION INFILTRATION; PERINEURAL at 10:13

## 2024-08-19 NOTE — PROGRESS NOTES
Infectious Diseases Progress Note    Patient:  Ravin Garza  YOB: 1960  MRN: [id number]   Admit date: 8/15/2024   Admitting Physician: Ramiro Kelly DO  Primary Care Physician: Rk Nelson MD    Chief Complaint/Interval History: Pigtail catheter has been removed from the chest.  He indicates larger catheter replacement planned..  He is without fever.  He is tolerating antibiotic treatment without side effect.  No diarrhea or rash on his current treatment.  He seems to be tolerating piperacillin/tazobactam without side effect.  I saw him this morning on rounds.  Later in the day he underwent additional pigtail catheter placement.    Intake/Output Summary (Last 24 hours) at 8/19/2024 0797  Last data filed at 8/19/2024 0349  Gross per 24 hour   Intake --   Output 600 ml   Net -600 ml     Allergies: No Known Allergies  Current Scheduled Medications:   [Held by provider] alteplase (ACTIVASE) 10 mg in sodium chloride 0.9 % 30 mL Syringe, 10 mg, Intrapleural, BID   And  [Held by provider] dornase alpha (PULMOZYME) 5 mg in sterile water (preservative free) 30 mL intrapleural syringe, 5 mg, Intrapleural, BID  famotidine, 20 mg, Oral, BID AC  nicotine, 1 patch, Transdermal, Q24H  piperacillin-tazobactam, 4.5 g, Intravenous, Q8H  potassium chloride, 20 mEq, Oral, BID With Meals  sodium chloride, 10 mL, Intravenous, Q12H      Pharmacy to Dose Zosyn,   sodium chloride, 75 mL/hr, Last Rate: 75 mL/hr (08/18/24 2059)       Current PRN Medications:    acetaminophen **OR** acetaminophen **OR** acetaminophen    senna-docusate sodium **AND** polyethylene glycol **AND** bisacodyl **AND** bisacodyl    calcium carbonate    Calcium Replacement - Follow Nurse / BPA Driven Protocol    Magnesium Standard Dose Replacement - Follow Nurse / BPA Driven Protocol    melatonin    ondansetron    Pharmacy to Dose Zosyn    Phosphorus Replacement - Follow Nurse / BPA Driven Protocol    Potassium Replacement - Follow Nurse /  "BPA Driven Protocol    sodium chloride    sodium chloride    Review of Systems see HPI    Vital Signs:  Temp (24hrs), Av.7 °F (36.5 °C), Min:97.4 °F (36.3 °C), Max:98.1 °F (36.7 °C)    /73 (BP Location: Right arm, Patient Position: Lying)   Pulse 87   Temp 97.4 °F (36.3 °C) (Oral)   Resp 18   Ht 188 cm (74\")   Wt 77.9 kg (171 lb 12.8 oz)   SpO2 98%   BMI 22.06 kg/m²     Physical Exam  Vital signs - reviewed.  Line/IV site - No erythema, warmth, induration, or tenderness.  This morning he was comfortable appearing  He was sitting at bedside  No drainage from prior pigtail catheter site  He was not coughing, he did not appear dyspneic, he was in no distress.    Lab Results:  CBC:   Results from last 7 days   Lab Units 243 24  0408 24  1308 24   WBC 10*3/mm3 12.12* 8.08  --  7.76   HEMOGLOBIN g/dL 8.1* 7.8* 7.2* 6.6*   HEMATOCRIT % 29.5* 28.5* 25.6* 25.1*   PLATELETS 10*3/mm3 281 190  --  213     BMP:  Results from last 7 days   Lab Units 24  0432 240 243 24  0409 24  1308 24   SODIUM mmol/L 133*  --  135* 136  --  137   POTASSIUM mmol/L 4.2 4.2 3.3* 3.5 3.4* 3.1*   CHLORIDE mmol/L 102  --  101 100  --  98   CO2 mmol/L 19.0*  --  22.0 24.0  --  28.0   BUN mg/dL 10  --  11 11  --  14   CREATININE mg/dL 0.67*  --  0.71* 0.70*  --  0.72*   GLUCOSE mg/dL 98  --  95 82  --  102*   CALCIUM mg/dL 8.4*  --  8.8 9.5  --  10.4   ALT (SGPT) U/L  --   --   --  21  --  19     Culture Results:   Blood Culture   Date Value Ref Range Status   2024 No growth at 2 days  Preliminary   2024 No growth at 2 days  Preliminary     Body fluid culture from 2024 collection:  Gram-positive cocci with ID and JESS to follow  Gram stain showed many white blood cells no organisms  Anaerobic culture from 2024-no growth to date    Radiology:  CT-guided chest tube placement completed today:  PROCEDURE: Multiple " nonenhanced axial images were obtained for  localization purposes with the patient in the prone position.     Radiation dose reduction techniques were utilized, including automated  exposure control and exposure modulation based on body size.     Skin marker was placed along the right posterior lateral chest wall. CT  redemonstrated a residual loculated empyema along the right  posterior/posterior lateral chest wall. Skin was marked along the  intended site for tube placement.     SEDATION: Conscious sedation was provided with supervision by myself and  a certified nurse with 0.5 mg IV Versed and 50 mcg IV fentanyl.  Continuous monitoring of heart rhythm, blood pressure, and pulse  oximetry was performed throughout the procedure.      Patient was sterilely prepped and draped in the usual fashion. Following  adequate local anesthesia with 1% lidocaine, dermatotomy a small was  made and a 5F trocar guided Yueh was advanced into the pleural  collection to a predetermined depth, position confirmed with repeat CT.  Trocar was removed and a Bentson wire was advanced through the catheter  with subsequent discontinuation of the Yueh catheter over the wire.  Serial dilation of the soft tissues performed utilizing 5 soft tissue  dilators, the largest of which was 14 Guatemalan. A 12 Guatemalan locking  pigtail catheter was then advanced into the pleural space over the  Bentson wire. Metal stiffener was then removed with position of the  pigtail confirmed by CT. Bentson wire was then removed. Pigtail was  locked and the catheter was secured to the skin using the provided  device. Short segment confirmatory CT then performed which showed  adequate position of the pigtail within the empyema. Patient tolerated  the procedure well. There were no immediate complications.     IMPRESSION:  1. Successful CT-guided right chest tube placement, 12  Guatemalan locking  pigtail catheter.   2. No immediate complication.    Additional Studies Reviewed:  Cytology from pigtail catheter placement August 16, 2024 appears to remain pending.      Impression:   1.  Loculated right pleural effusion-likely empyema.  The culture from original pigtail catheter placement dated August 16, 2024 appears to be growing gram-positive organisms.  This may turn out to be some type of pyogenic strep.  Final ID and JESS pending.  Pigtail catheter replaced today.  2.  History of renal cell cancer.  3.  Tobacco use  4.  Weight loss  5.  Microcytic anemia  6.  Cirrhosis    Recommendations:   Continue treatment with piperacillin/tazobactam  Await cytology from original pigtail catheter placement  Await final ID and susceptibility from culture dated August 16, 2024 from right pleural fluid collection  Continue to follow    Mickey Zamora MD

## 2024-08-19 NOTE — PROGRESS NOTES
[facility] Medicine Services  INPATIENT PROGRESS NOTE    Patient Name: Ravin Garza  Date of Admission: 8/15/2024  Today's Date: 08/19/24  Length of Stay: 4  Primary Care Physician: Rk Nelson MD    Subjective   Chief Complaint: f/u empyema, FTT    HPI   Patient seen after pigtail placement.  Known to me from admission.  Continues to be about the same.  He says he actually feels a little bit better than he did prior.  Denies any significant discomfort at chest tube site even after placement.  Denies feeling short of breath.  No nausea.  No fevers noted.        Review of Systems   All pertinent negatives and positives are as above. All other systems have been reviewed and are negative unless otherwise stated.     Objective    Temp:  [97.4 °F (36.3 °C)-98.1 °F (36.7 °C)] 97.9 °F (36.6 °C)  Heart Rate:  [86-96] 93  Resp:  [16-21] 20  BP: ()/(66-95) 130/95  Physical Exam  GEN: Awake, alert, interactive, in NAD, appears thin/frail  HEENT: PERRLA, EOMI, Anicteric, Trachea midline  Lungs: diminished R base, no wheezing/rales  Heart: RRR, +S1/s2, no rub  ABD: soft, nt/nd, +BS, no guarding/rebound  Extremities: L AKA, cyanosis, no edema  Skin: no petechiae  Neuro: AAOx3, no focal deficits  Psych: normal mood, flat affect        Results Review:  I have reviewed the labs, radiology results, and diagnostic studies.    Laboratory Data:   Results from last 7 days   Lab Units 08/18/24  0413 08/17/24  0408 08/16/24  1308 08/16/24  0413   WBC 10*3/mm3 12.12* 8.08  --  7.76   HEMOGLOBIN g/dL 8.1* 7.8* 7.2* 6.6*   HEMATOCRIT % 29.5* 28.5* 25.6* 25.1*   PLATELETS 10*3/mm3 281 190  --  213        Results from last 7 days   Lab Units 08/19/24  0432 08/18/24  2120 08/18/24  0413 08/17/24  0409 08/16/24  1308 08/16/24  0413   SODIUM mmol/L 133*  --  135* 136  --  137   POTASSIUM mmol/L 4.2 4.2 3.3* 3.5   < > 3.1*   CHLORIDE mmol/L 102  --  101 100  --  98   CO2 mmol/L 19.0*  --  22.0 24.0   --  28.0   BUN mg/dL 10  --  11 11  --  14   CREATININE mg/dL 0.67*  --  0.71* 0.70*  --  0.72*   CALCIUM mg/dL 8.4*  --  8.8 9.5  --  10.4   BILIRUBIN mg/dL  --   --   --  0.7  --  0.6   ALK PHOS U/L  --   --   --  158*  --  152*   ALT (SGPT) U/L  --   --   --  21  --  19   AST (SGOT) U/L  --   --   --  43*  --  42*   GLUCOSE mg/dL 98  --  95 82  --  102*    < > = values in this interval not displayed.       Culture Data:   Blood Culture   Date Value Ref Range Status   08/16/2024 No growth at 2 days  Preliminary   08/16/2024 No growth at 2 days  Preliminary       Radiology Data:   Imaging Results (Last 24 Hours)       Procedure Component Value Units Date/Time    CT Guided Chest Tube [[id number]] Resulted: 08/19/24 0957     Updated: 08/19/24 0957    XR Chest 1 View [[id number]] Collected: 08/19/24 0710     Updated: 08/19/24 0715    Narrative:      EXAMINATION: XR CHEST 1 VW-     8/19/2024 2:35 AM     HISTORY: right pleural effusion     A frontal projection of the chest is compared with the previous study  dated 8/18/2024.     The lungs are poorly expanded similar to the previous study.     There is a diffuse haziness over the right lower lung which may  represent right lower lung consolidation and/or posteriorly loculated  pleural effusion.     There is laterally loculated fluid in the right lower chest laterally  similar to the previous study.     There is no pulmonary congestion. There is no pneumothorax.     The heart size is in the normal range. Moderate widening of the  mediastinum due to uncoiling of the aortic arch is similar to the  previous study.     No acute bony abnormality. There is hardware fusion of the lower  cervical spine similar to the previous study.       Impression:      1. No significant change in the cardiopulmonary status since the  previous study.        This report was signed and finalized on 8/19/2024 7:12 AM by Dr. Veronika Gutierrez MD.       XR Chest 1 View [[id number]] Collected:  08/18/24 1348     Updated: 08/18/24 1355    Narrative:      EXAM: XR CHEST 1 VW- 8/18/2024 12:25 PM     HISTORY: post chest tube removal       COMPARISON: 8/18/2024.     TECHNIQUE: Single frontal radiograph of the chest was obtained.     FINDINGS:     Support Devices: Interval removal of right basilar chest tube.     Cardiac and Mediastinal Silhouettes: Normal.     Lungs/Pleura: Increasing right basilar confluent opacities. No sizable  pleural effusion. No visible pneumothorax.     Osseous structures: No acute osseous finding.     Other: None.       Impression:         Interval removal of right basilar chest tube.     Increasing right basilar confluent opacities. This is likely  representative of known loculated right pleural effusion which is either  increasing in size or right basilar airspace disease.           This report was signed and finalized on 8/18/2024 1:51 PM by Presley Mendoza.               I have reviewed the patient's current medications.     Assessment/Plan   Assessment  Active Hospital Problems    Diagnosis     **Empyema lung     Microcytic anemia     Hypomagnesemia     Weight loss     History of traumatic brain injury     Abnormal finding on imaging - outpatient ct abd w/ ? empyema     Hypercalcemia     Severe malnutrition     Generalized weakness        Treatment Plan  #1 lung empyema -right-sided based on CAT scan after arrival.  Had pigtail catheter prior which got dislodged.  Went for another pigtail drain which was placed later today.  Patient is on Zosyn.  Possible lytics through new catheter.  Monitoring pleural cultures.  Blood cultures so far negative.  CT surgery and ID following, cruciate assistance.    #2 iron deficiency anemia -absolute iron deficiency on arrival.  Hemoccult was negative from outside facility.  Received 1 unit PRBC and hemoglobin has stayed up posttransfusion.  Now status post 3 days of IV iron.  Will start oral tomorrow.    #3 weight loss/malnutrition/failure to  thrive -history of EtOH.  History of TBI.  History of renal cancer with resection in the past.  No clear cancer malignancy seen on CTs to this point.  Nutrition to maximize caloric intake    #4 hypercalcemia -resolved.  Patient refused IVF earlier today.  Okay to DC and monitor with p.o. intake.    #5 hypokalemia -improved with replacements    #6 hypomagnesemia -improved with replacement.    #7 history of EtOH -patient states he stopped drinking about a month ago.  No signs of withdrawal here.  Will need PCP and GI follow-up for monitoring and care postdischarge.  Platelet counts are normal.  INR is normal.      Medical Decision Making  Number and Complexity of problems: Complex case.  4-5 acute problems, multiple chronic problems.  Differential Diagnosis: As above    Conditions and Status        Clinically stable and nontoxic.  On room air.  Not at goal.     [facility] Data  External documents reviewed: None  Cardiac tracing (EKG, telemetry) interpretation: None  Radiology interpretation: Prior reports reviewed  Labs reviewed: As above  Any tests that were considered but not ordered: None     Decision rules/scores evaluated (example [id number], Wells, etc): None     Discussed with: Patient and nursing staff     Care Planning  Shared decision making: Patient apprised of current labs, vitals, imaging and treatment plan.  They are agreeable with proceeding with plans as discussed.    Code status and discussions: DNR but full care otherwise    Disposition  Social Determinants of Health that impact treatment or disposition: none  I expect the patient to be discharged to home when appropriate, likely several days yet pending chest tube/care needs.    Electronically signed by Ramiro Kelly DO, 08/19/24, 15:17 CDT.

## 2024-08-19 NOTE — PLAN OF CARE
Goal Outcome Evaluation:   A&Ox4, on room air, resting in bed comfortably. S/p chest tube insertion- site CDI. No c/o pain or discomfort at this time. Hourly rounding. VSS. Safety maintained. Alteplase given via chest tube- clamped at 1300- unclamped at 1500. Patient tolerated well. Refusing IV fluids- MD aware- patient eating and drinking adequately. IV antibx given. Will continue to monitor until change of shift.

## 2024-08-19 NOTE — PLAN OF CARE
Goal Outcome Evaluation:           Progress: no change  Outcome Evaluation: VSS NSR-ST  per tele.  Denies pain nausea or sob this shift.  IV abx per MAR.  Has been NPO past midnight for chest tube insertion this morning.  Has slept well.  Will continue to monitor.

## 2024-08-19 NOTE — INTERVAL H&P NOTE
H&P reviewed. The patient was examined and there are no changes to the H&P.      Risk, Benefits, and Alternatives discussed with the patient.  History and Physical reviewed, and no changes.  Plan is for moderate sedation, and the patient agrees to proceed with procedure.    An immediate assessment was done prior to the administration of moderate sedation.

## 2024-08-19 NOTE — PROGRESS NOTES
"Patient name: Ravin Garza  Patient : 1960  VISIT # [id number]  MR #[id number]    Procedure:  Procedure Date:  POD:* No surgery found *    Subjective   Chief complaint: Weakness    Right pigtail catheter dislodged over the weekend.  Dr. Manzanares attempted to place new right sided pigtail catheter at bedside and was unable to advance catheter.  He is n.p.o. for IR placement of new right chest tube.  He remains on Zosyn per ID    ROS: No fevers or chills.  Stable right side chest wall pain.  No shortness of breath       Objective     Visit Vitals  /73 (BP Location: Right arm, Patient Position: Lying)   Pulse 87   Temp 97.4 °F (36.3 °C) (Oral)   Resp 18   Ht 188 cm (74\")   Wt 77.9 kg (171 lb 12.8 oz)   SpO2 98%   BMI 22.06 kg/m²       Intake/Output Summary (Last 24 hours) at 2024 0747  Last data filed at 2024 0349  Gross per 24 hour   Intake --   Output 600 ml   Net -600 ml       Lab:     CBC:  Results from last 7 days   Lab Units 24  0413 24  0408 24  1308 24  0413   WBC 10*3/mm3 12.12* 8.08  --  7.76   HEMATOCRIT % 29.5* 28.5* 25.6* 25.1*   PLATELETS 10*3/mm3 281 190  --  213          BMP:  Results from last 7 days   Lab Units 24  0432 24  2120 24  0413 24  0409   SODIUM mmol/L 133*  --  135* 136   POTASSIUM mmol/L 4.2 4.2 3.3* 3.5   CHLORIDE mmol/L 102  --  101 100   CO2 mmol/L 19.0*  --  22.0 24.0   GLUCOSE mg/dL 98  --  95 82   BUN mg/dL 10  --  11 11   CREATININE mg/dL 0.67*  --  0.71* 0.70*          COAG:  Results from last 7 days   Lab Units 24  0414   INR  0.98       IMAGES:       Imaging Results (Last 24 Hours)       Procedure Component Value Units Date/Time    XR Chest 1 View [[id number]] Collected: 2410     Updated: 2415    Narrative:      EXAMINATION: XR CHEST 1 VW-     2024 2:35 AM     HISTORY: right pleural effusion     A frontal projection of the chest is compared with the previous study  dated " 8/18/2024.     The lungs are poorly expanded similar to the previous study.     There is a diffuse haziness over the right lower lung which may  represent right lower lung consolidation and/or posteriorly loculated  pleural effusion.     There is laterally loculated fluid in the right lower chest laterally  similar to the previous study.     There is no pulmonary congestion. There is no pneumothorax.     The heart size is in the normal range. Moderate widening of the  mediastinum due to uncoiling of the aortic arch is similar to the  previous study.     No acute bony abnormality. There is hardware fusion of the lower  cervical spine similar to the previous study.       Impression:      1. No significant change in the cardiopulmonary status since the  previous study.        This report was signed and finalized on 8/19/2024 7:12 AM by Dr. Veronika Gutierrez MD.       XR Chest 1 View [[id number]] Collected: 08/18/24 1348     Updated: 08/18/24 1355    Narrative:      EXAM: XR CHEST 1 VW- 8/18/2024 12:25 PM     HISTORY: post chest tube removal       COMPARISON: 8/18/2024.     TECHNIQUE: Single frontal radiograph of the chest was obtained.     FINDINGS:     Support Devices: Interval removal of right basilar chest tube.     Cardiac and Mediastinal Silhouettes: Normal.     Lungs/Pleura: Increasing right basilar confluent opacities. No sizable  pleural effusion. No visible pneumothorax.     Osseous structures: No acute osseous finding.     Other: None.       Impression:         Interval removal of right basilar chest tube.     Increasing right basilar confluent opacities. This is likely  representative of known loculated right pleural effusion which is either  increasing in size or right basilar airspace disease.           This report was signed and finalized on 8/18/2024 1:51 PM by Presley Mendoza.       XR Chest 1 View [[id number]] Collected: 08/18/24 0753     Updated: 08/18/24 0800    Narrative:      EXAM: XR CHEST 1 VW-  8/18/2024 2:30 AM     HISTORY: right pleural effusion       COMPARISON: 8/17/2024.     TECHNIQUE: Single frontal radiograph of the chest was obtained.     FINDINGS:     Support Devices: The right basilar chest tube appears changed in  orientation and may have been slightly retracted compared to the prior  study. The coiled tip however does appear to be predominantly within the  pleural space.     Cardiac and Mediastinal Silhouettes: Normal.     Lungs/Pleura: Stable confluent opacities in the right midlung zone,  likely representative of the known loculated pleural effusion. No  visible pneumothorax.     Osseous structures: No acute osseous finding. Old right ninth rib  fracture.     Other: None.       Impression:         The right basilar chest tube appears changed in orientation and may have  been slightly retracted compared to the prior study. The partially  coiled tip does appear predominantly within the pleural space.     Stable confluent opacities throughout the right midlung zone, likely  representative of known loculated pleural effusion.           This report was signed and finalized on 8/18/2024 7:57 AM by Presley Mendoza.                 Physical Exam:  General: Alert, oriented. No apparent distress.  Chronically ill-appearing  Cardiovascular: Regular rate and rhythm without murmur, rubs, or gallops.    Pulmonary: Clear to auscultation bilaterally without wheezing, rubs, or rales.  Chest: Previous right pigtail catheter site clean dry and intact.  Abdomen: Soft, nondistended, and nontender.  Extremities: Warm, moves all extremities. No edema.   Neurologic:  Grossly intact with no focal deficits.            Impression:  Right pleural effusion  Generalized weakness  Weight loss        Plan:  Plan for CT-guided right pigtail catheter placement in radiology today.  Dr. Manzanares has discussed with Dr. Ferguson.  Remain n.p.o.  Continue protein supplements with each meal    Antibiotic management per infectious  disease        Hilary Doan, KRIS  08/19/24  07:47 CDT

## 2024-08-20 ENCOUNTER — APPOINTMENT (OUTPATIENT)
Dept: GENERAL RADIOLOGY | Facility: HOSPITAL | Age: 64
End: 2024-08-20
Payer: MEDICARE

## 2024-08-20 LAB
ALBUMIN SERPL-MCNC: 2.7 G/DL (ref 3.5–5.2)
ALBUMIN/GLOB SERPL: 0.8 G/DL
ALP SERPL-CCNC: 146 U/L (ref 39–117)
ALT SERPL W P-5'-P-CCNC: 17 U/L (ref 1–41)
ANION GAP SERPL CALCULATED.3IONS-SCNC: 11 MMOL/L (ref 5–15)
AST SERPL-CCNC: 30 U/L (ref 1–40)
BACTERIA FLD CULT: ABNORMAL
BASOPHILS # BLD AUTO: 0.05 10*3/MM3 (ref 0–0.2)
BASOPHILS NFR BLD AUTO: 0.5 % (ref 0–1.5)
BILIRUB SERPL-MCNC: 0.7 MG/DL (ref 0–1.2)
BUN SERPL-MCNC: 8 MG/DL (ref 8–23)
BUN/CREAT SERPL: 14.3 (ref 7–25)
CALCIUM SPEC-SCNC: 7.8 MG/DL (ref 8.6–10.5)
CHLORIDE SERPL-SCNC: 106 MMOL/L (ref 98–107)
CO2 SERPL-SCNC: 17 MMOL/L (ref 22–29)
CREAT SERPL-MCNC: 0.56 MG/DL (ref 0.76–1.27)
DEPRECATED RDW RBC AUTO: 60.8 FL (ref 37–54)
EGFRCR SERPLBLD CKD-EPI 2021: 109.4 ML/MIN/1.73
EOSINOPHIL # BLD AUTO: 0.11 10*3/MM3 (ref 0–0.4)
EOSINOPHIL NFR BLD AUTO: 1 % (ref 0.3–6.2)
ERYTHROCYTE [DISTWIDTH] IN BLOOD BY AUTOMATED COUNT: 25.6 % (ref 12.3–15.4)
GLOBULIN UR ELPH-MCNC: 3.2 GM/DL
GLUCOSE SERPL-MCNC: 93 MG/DL (ref 65–99)
GRAM STN SPEC: ABNORMAL
GRAM STN SPEC: ABNORMAL
HCT VFR BLD AUTO: 25.5 % (ref 37.5–51)
HGB BLD-MCNC: 7.3 G/DL (ref 13–17.7)
IMM GRANULOCYTES # BLD AUTO: 0.11 10*3/MM3 (ref 0–0.05)
IMM GRANULOCYTES NFR BLD AUTO: 1 % (ref 0–0.5)
LYMPHOCYTES # BLD AUTO: 0.92 10*3/MM3 (ref 0.7–3.1)
LYMPHOCYTES NFR BLD AUTO: 8.5 % (ref 19.6–45.3)
MAGNESIUM SERPL-MCNC: 1.7 MG/DL (ref 1.6–2.4)
MCH RBC QN AUTO: 21 PG (ref 26.6–33)
MCHC RBC AUTO-ENTMCNC: 28.6 G/DL (ref 31.5–35.7)
MCV RBC AUTO: 73.3 FL (ref 79–97)
MONOCYTES # BLD AUTO: 1.21 10*3/MM3 (ref 0.1–0.9)
MONOCYTES NFR BLD AUTO: 11.1 % (ref 5–12)
NEUTROPHILS NFR BLD AUTO: 77.9 % (ref 42.7–76)
NEUTROPHILS NFR BLD AUTO: 8.47 10*3/MM3 (ref 1.7–7)
PHOSPHATE SERPL-MCNC: 2.2 MG/DL (ref 2.5–4.5)
PHOSPHATE SERPL-MCNC: 2.2 MG/DL (ref 2.5–4.5)
PLATELET # BLD AUTO: 316 10*3/MM3 (ref 140–450)
PMV BLD AUTO: 9.9 FL (ref 6–12)
POTASSIUM SERPL-SCNC: 4.1 MMOL/L (ref 3.5–5.2)
PROT SERPL-MCNC: 5.9 G/DL (ref 6–8.5)
RBC # BLD AUTO: 3.48 10*6/MM3 (ref 4.14–5.8)
SODIUM SERPL-SCNC: 134 MMOL/L (ref 136–145)
WBC NRBC COR # BLD AUTO: 10.87 10*3/MM3 (ref 3.4–10.8)

## 2024-08-20 PROCEDURE — 25810000003 SODIUM CHLORIDE 0.9 % SOLUTION 250 ML FLEX CONT: Performed by: FAMILY MEDICINE

## 2024-08-20 PROCEDURE — 25010000002 ALTEPLASE 2 MG RECONSTITUTED SOLUTION 1 EACH VIAL: Performed by: NURSE PRACTITIONER

## 2024-08-20 PROCEDURE — 85025 COMPLETE CBC W/AUTO DIFF WBC: CPT | Performed by: INTERNAL MEDICINE

## 2024-08-20 PROCEDURE — 84100 ASSAY OF PHOSPHORUS: CPT | Performed by: INTERNAL MEDICINE

## 2024-08-20 PROCEDURE — 80053 COMPREHEN METABOLIC PANEL: CPT | Performed by: INTERNAL MEDICINE

## 2024-08-20 PROCEDURE — 99232 SBSQ HOSP IP/OBS MODERATE 35: CPT | Performed by: SURGERY

## 2024-08-20 PROCEDURE — 71045 X-RAY EXAM CHEST 1 VIEW: CPT

## 2024-08-20 PROCEDURE — 84100 ASSAY OF PHOSPHORUS: CPT | Performed by: FAMILY MEDICINE

## 2024-08-20 PROCEDURE — 25010000002 PIPERACILLIN SOD-TAZOBACTAM PER 1 G: Performed by: INTERNAL MEDICINE

## 2024-08-20 PROCEDURE — 83735 ASSAY OF MAGNESIUM: CPT | Performed by: INTERNAL MEDICINE

## 2024-08-20 RX ORDER — SODIUM CHLORIDE 0.9 % (FLUSH) 0.9 %
10 SYRINGE (ML) INJECTION EVERY 8 HOURS
Status: DISCONTINUED | OUTPATIENT
Start: 2024-08-20 | End: 2024-08-24 | Stop reason: SDUPTHER

## 2024-08-20 RX ADMIN — Medication 10 ML: at 09:28

## 2024-08-20 RX ADMIN — PIPERACILLIN AND TAZOBACTAM 4.5 G: 4; .5 INJECTION, POWDER, FOR SOLUTION INTRAVENOUS at 01:27

## 2024-08-20 RX ADMIN — NICOTINE 1 PATCH: 21 PATCH, EXTENDED RELEASE TRANSDERMAL at 09:27

## 2024-08-20 RX ADMIN — FAMOTIDINE 20 MG: 20 TABLET, FILM COATED ORAL at 16:42

## 2024-08-20 RX ADMIN — FAMOTIDINE 20 MG: 20 TABLET, FILM COATED ORAL at 09:27

## 2024-08-20 RX ADMIN — POTASSIUM CHLORIDE 20 MEQ: 750 CAPSULE, EXTENDED RELEASE ORAL at 09:27

## 2024-08-20 RX ADMIN — PIPERACILLIN AND TAZOBACTAM 4.5 G: 4; .5 INJECTION, POWDER, FOR SOLUTION INTRAVENOUS at 09:27

## 2024-08-20 RX ADMIN — DORNASE ALFA 5 MG: 1 SOLUTION RESPIRATORY (INHALATION) at 10:29

## 2024-08-20 RX ADMIN — Medication 10 ML: at 21:31

## 2024-08-20 RX ADMIN — SODIUM PHOSPHATE, MONOBASIC, MONOHYDRATE AND SODIUM PHOSPHATE, DIBASIC, ANHYDROUS 15 MMOL: 142; 276 INJECTION, SOLUTION INTRAVENOUS at 09:27

## 2024-08-20 RX ADMIN — PIPERACILLIN AND TAZOBACTAM 4.5 G: 4; .5 INJECTION, POWDER, FOR SOLUTION INTRAVENOUS at 16:42

## 2024-08-20 RX ADMIN — DORNASE ALFA 5 MG: 1 SOLUTION RESPIRATORY (INHALATION) at 21:21

## 2024-08-20 RX ADMIN — Medication 10 ML: at 16:42

## 2024-08-20 RX ADMIN — ALTEPLASE 10 MG: 2.2 INJECTION, POWDER, LYOPHILIZED, FOR SOLUTION INTRAVENOUS at 21:21

## 2024-08-20 RX ADMIN — ACETAMINOPHEN 650 MG: 325 TABLET ORAL at 23:58

## 2024-08-20 RX ADMIN — ALTEPLASE 10 MG: 2.2 INJECTION, POWDER, LYOPHILIZED, FOR SOLUTION INTRAVENOUS at 10:28

## 2024-08-20 RX ADMIN — Medication 10 ML: at 23:55

## 2024-08-20 RX ADMIN — SODIUM PHOSPHATE, MONOBASIC, MONOHYDRATE AND SODIUM PHOSPHATE, DIBASIC, ANHYDROUS 15 MMOL: 142; 276 INJECTION, SOLUTION INTRAVENOUS at 20:21

## 2024-08-20 RX ADMIN — POTASSIUM CHLORIDE 20 MEQ: 750 CAPSULE, EXTENDED RELEASE ORAL at 16:43

## 2024-08-20 RX ADMIN — FERROUS SULFATE TAB 325 MG (65 MG ELEMENTAL FE) 325 MG: 325 (65 FE) TAB at 09:27

## 2024-08-20 NOTE — PROGRESS NOTES
[facility] Medicine Services  INPATIENT PROGRESS NOTE    Patient Name: Ravin Garza  Date of Admission: 8/15/2024  Today's Date: 08/20/24  Length of Stay: 5  Primary Care Physician: Rk Nelson MD    Subjective   Chief Complaint: Weakness  HPI   64-year-old male with history of traumatic brain injury, morbid renal accidents, renal cell carcinoma, alcohol abuse, left above-the-knee amputation, admitted to the hospital on 8/15/2024, complaining of generalized weakness, weight loss, poor appetite.  History admitted with anemia, and imaging studies showing possible right-sided empyema.  He was transferred to [facility] for management.    Patient had a pigtail catheter placed on 8/16/2024.  Apparently not draining appropriately and had a repeat procedure pigtail catheter placement August 19, 2024.    I started taking care of this patient on 8/20/2024.  He reports feeling better, pain controlled.  No significant dyspnea.  No fevers.        Review of Systems   All pertinent negatives and positives are as above. All other systems have been reviewed and are negative unless otherwise stated.     Objective    Temp:  [97.4 °F (36.3 °C)-97.9 °F (36.6 °C)] 97.7 °F (36.5 °C)  Heart Rate:  [85-97] 88  Resp:  [16-21] 18  BP: ()/(60-95) 123/88  Physical Exam  Constitutional:       Appearance: Chronically ill-appearing..  Alert oriented  HENT:      Head: Normocephalic and atraumatic.      Nose: Nose normal.      Mouth/Throat:      Mouth: Mucous membranes are moist.      Pharynx: Oropharynx is clear.   Eyes:      Extraocular Movements: Extraocular movements intact.      Conjunctiva/sclera: Conjunctivae normal.      Pupils: Pupils are equal, round, and reactive to light.   Cardiovascular:      Rate and Rhythm: Normal rate and regular rhythm.      Pulses: Normal pulses.   Pulmonary: Pigtail in place on the right posterior thorax.     Effort: No respiratory distress.      Breath sounds:  Decreased breath sounds.  Abdominal:      General: Abdomen is flat. Bowel sounds are normal.      Palpations: Abdomen is soft.      Tenderness: There is no guarding or rebound.   Musculoskeletal:         General: Normal range of motion.      Cervical back: Normal range of motion and neck supple.   Extremities: Left above-the-knee amputation status.  Right lower extremity with no edema  Skin:     Capillary Refill: Capillary refill takes less than 2 seconds.      Coloration: Skin is not jaundiced.      Findings: No rash.   Neurological:      General: No focal deficit present.      Mental Status: Patient is alert, oriented to place time and person.     Sensory: No sensory deficit.      Motor: No weakness.      Coordination: Coordination normal.   Psychiatric:         Mood and Affect: Mood normal.         Behavior: Behavior normal.           Results Review:  I have reviewed the labs, radiology results, and diagnostic studies.    Laboratory Data:   Results from last 7 days   Lab Units 08/20/24  0501 08/19/24  1325 08/18/24  0413   WBC 10*3/mm3 10.87* 12.29* 12.12*   HEMOGLOBIN g/dL 7.3* 8.0* 8.1*   HEMATOCRIT % 25.5* 29.2* 29.5*   PLATELETS 10*3/mm3 316 378 281        Results from last 7 days   Lab Units 08/20/24  0501 08/19/24  0432 08/18/24  2120 08/18/24  0413 08/17/24  0409 08/16/24  1308 08/16/24  0413   SODIUM mmol/L 134* 133*  --  135* 136  --  137   POTASSIUM mmol/L 4.1 4.2 4.2 3.3* 3.5   < > 3.1*   CHLORIDE mmol/L 106 102  --  101 100  --  98   CO2 mmol/L 17.0* 19.0*  --  22.0 24.0  --  28.0   BUN mg/dL 8 10  --  11 11  --  14   CREATININE mg/dL 0.56* 0.67*  --  0.71* 0.70*  --  0.72*   CALCIUM mg/dL 7.8* 8.4*  --  8.8 9.5  --  10.4   BILIRUBIN mg/dL 0.7  --   --   --  0.7  --  0.6   ALK PHOS U/L 146*  --   --   --  158*  --  152*   ALT (SGPT) U/L 17  --   --   --  21  --  19   AST (SGOT) U/L 30  --   --   --  43*  --  42*   GLUCOSE mg/dL 93 98  --  95 82  --  102*    < > = values in this interval not displayed.        Culture Data:   Blood Culture   Date Value Ref Range Status   08/16/2024 No growth at 3 days  Preliminary   08/16/2024 No growth at 3 days  Preliminary       Radiology Data:   Imaging Results (Last 24 Hours)       Procedure Component Value Units Date/Time    XR Chest 1 View [[id number]] Collected: 08/20/24 0628     Updated: 08/20/24 0634    Narrative:      EXAMINATION: XR CHEST 1 VW-  8/20/2024 6:29 AM     HISTORY: Right pleural effusion     FINDINGS: Upright frontal projection of the chest is compared to  previous exam of 1 day earlier. The small caliber thoracostomy tube  again projects over the right lung base. This was placed into a  loculated collection within the posterior right pleural space. There is  diminished opacity within the right hemithorax suggesting interval  diminishment in size of the collection. No air-fluid levels are present.  There is no pneumothorax. Left basilar subsegmental atelectasis is  present.       Impression:      1. The pigtail catheter is slightly repositioned from the previous exam  but I feel remains present within the posterior pleural-based collection  in the right lung base. Overall diminished opacity within the right mid  and lower lung zone from the previous exam suggesting interval  diminishment in size of the loculated effusion. No pneumothorax.     This report was signed and finalized on 8/20/2024 6:31 AM by Dr. Jalen Mccarty MD.       XR Chest 1 View [[id number]] Collected: 08/19/24 1427     Updated: 08/19/24 1433    Narrative:      EXAMINATION:  XR CHEST 1 VW-  8/19/2024 1:17 PM     HISTORY: Right chest tube placement.     COMPARISON: 8/19/2024.     TECHNIQUE: Single view AP image.     FINDINGS: There is a new pigtail catheter overlying the right lung base.  There is no evidence of pneumothorax. There is pleural fluid on the  right. There is mild atelectasis in the left lung base. There is mild  bronchial wall thickening, stable. The heart is normal in size. No  acute  bony abnormality is seen.          Impression:      1. New pigtail catheter overlying the right lung base. No evidence of  pneumothorax. Pleural fluid on the right appears relatively stable.  2. Mild atelectasis left lung base.           This report was signed and finalized on 8/19/2024 2:30 PM by Dr. Fer Jonas MD.       CT Guided Chest Tube [[id number]] Collected: 08/19/24 1125     Updated: 08/19/24 1134    Narrative:      CT-GUIDED CHEST TUBE PLACEMENT, right side     HISTORY: Male who is 64 years-old, with a history of right-sided empyema     DOSE LENGTH PRODUCT: 905 mGy cm. Automated exposure control was also  utilized to decrease patient radiation dose.     The procedure, including but not limited to the risk of hemorrhage,  infection, lung injury was discussed with the patient prior to  examination, informed consent was obtained.     PROCEDURE: Multiple nonenhanced axial images were obtained for  localization purposes with the patient in the prone position.     Radiation dose reduction techniques were utilized, including automated  exposure control and exposure modulation based on body size.     Skin marker was placed along the right posterior lateral chest wall. CT  redemonstrated a residual loculated empyema along the right  posterior/posterior lateral chest wall. Skin was marked along the  intended site for tube placement.     SEDATION: Conscious sedation was provided with supervision by myself and  a certified nurse with 0.5 mg IV Versed and 50 mcg IV fentanyl.  Continuous monitoring of heart rhythm, blood pressure, and pulse  oximetry was performed throughout the procedure.      Patient was sterilely prepped and draped in the usual fashion. Following  adequate local anesthesia with 1% lidocaine, dermatotomy a small was  made and a 5F trocar guided Yueh was advanced into the pleural  collection to a predetermined depth, position confirmed with repeat CT.  Trocar was removed and a Bentson wire  was advanced through the catheter  with subsequent discontinuation of the Yueh catheter over the wire.  Serial dilation of the soft tissues performed utilizing 5 soft tissue  dilators, the largest of which was 14 Kazakh. A 12 Kazakh locking  pigtail catheter was then advanced into the pleural space over the  Bentson wire. Metal stiffener was then removed with position of the  pigtail confirmed by CT. Bentson wire was then removed. Pigtail was  locked and the catheter was secured to the skin using the provided  device. Short segment confirmatory CT then performed which showed  adequate position of the pigtail within the empyema. Patient tolerated  the procedure well. There were no immediate complications.       Impression:      1. Successful CT-guided right chest tube placement, 12  Kazakh locking  pigtail catheter.   2. No immediate complication.     SEDATION: Conscious sedation was provided with supervision by myself and  a certified nurse with 0.5 mg IV Versed and 50 mcg IV fentanyl.  Continuous monitoring of heart rhythm, blood pressure, and pulse  oximetry was performed throughout the procedure. The first dose of  sedation was administered at 1011 hours, and my personal supervision of  the patient was completed at time of the procedure completion at 1041  hours. Total intraservice time was 30 minutes. Continued nursing  monitoring was performed per sedation protocol.     This report was signed and finalized on 8/19/2024 11:31 AM by Dr Ge Ferguson.               I have reviewed the patient's current medications.     Assessment/Plan   Assessment  Active Hospital Problems    Diagnosis     **Empyema lung     Microcytic anemia     Hypomagnesemia     Weight loss     History of traumatic brain injury     Abnormal finding on imaging - outpatient ct abd w/ ? empyema     Hypercalcemia     Severe malnutrition     Generalized weakness      Right pleural effusion/empyema  Hypophosphatemia  Hypomagnesemia  History of left  above-the-knee amputation  Severe malnutrition  Alcohol abuse disorder  Chronic microcytic anemia, iron deficiency      Phosphorus 2.2.  Calcium 7.8 with an albumin of 2.7.  Corrected calcium 8.5  Alkaline phosphatase 146.  Normal LFT and AST normal bilirubin  Magnesium 1.7    White blood cell count down to 10,870    Initial hemoglobin 6.6, increased to 8.1, today 7.3  Low iron 14, low ferritin 29, TIBC 457.  Platelet count normal.    Chest tube drainage reported 300 mL    Pleural fluid culture from 8/16/2024, growth of Streptococcus mitis  Cytology body fluid pending    Treatment Plan    Patient currently on Zosyn, started on 8/16/2024, day #4.  Continue antibiotic treatment per infectious diseases.    Follow hemoglobin hematocrit, transfuse PRBCa if below 7.    Replace electrolytes.  Follow levels.    Daily chest x-rays  Patient receiving tPA via pigtail catheter, today is day 2/3; will have a CT scan of the chest repeated on Friday as per cardiothoracic surgery recommendations.        Medical Decision Making  Number and Complexity of problems: 7, moderate to high complexity  Differential Diagnosis: See above    Conditions and Status        Condition is unchanged.     MDM Data  External documents reviewed: None  Cardiac tracing (EKG, telemetry) interpretation: No new  Radiology interpretation: Radiology reports reviewed  Labs reviewed: Yes  Any tests that were considered but not ordered: No     Decision rules/scores evaluated (example [id number], Wells, etc): See chart     Discussed with: Patient     Care Planning  Shared decision making: With patient  Code status and discussions: No CPR    Disposition  Social Determinants of Health that impact treatment or disposition: Alcohol use  I expect the patient to be discharged once pigtail removed.     Electronically signed by Hernan Benedict MD, 08/20/24, 08:55 CDT.

## 2024-08-20 NOTE — PROGRESS NOTES
"Patient name: Ravin Garza  Patient : 1960  VISIT # [id number]  MR #[id number]    Procedure:  Procedure Date:  POD:* No surgery found *    Subjective   Chief complaint: Weakness    Resting in bed.  Tolerating room air.  Right pigtail catheter in place to -20 cm suction, no airleak.  Intrapleural tPA started yesterday.  Pain is well-controlled.  No complaints of shortness of breath.  He remains on Zosyn per ID    ROS: No fevers or chills.  Stable right side chest wall pain.  No shortness of breath       Objective     Visit Vitals  /88 (BP Location: Right arm, Patient Position: Lying)   Pulse 88   Temp 97.7 °F (36.5 °C) (Oral)   Resp 18   Ht 188 cm (74\")   Wt 77.9 kg (171 lb 12.8 oz)   SpO2 99%   BMI 22.06 kg/m²       Intake/Output Summary (Last 24 hours) at 2024 0744  Last data filed at 2024 0514  Gross per 24 hour   Intake 0 ml   Output 872 ml   Net -872 ml   Right pleural pigtail catheter: 322 mL / 24 hours, serosanguineous    Lab:     CBC:  Results from last 7 days   Lab Units 24  0501 24  1325 24  0413   WBC 10*3/mm3 10.87* 12.29* 12.12*   HEMATOCRIT % 25.5* 29.2* 29.5*   PLATELETS 10*3/mm3 316 378 281          BMP:  Results from last 7 days   Lab Units 24  0501 24  0432 24  2120 24  0413   SODIUM mmol/L 134* 133*  --  135*   POTASSIUM mmol/L 4.1 4.2 4.2 3.3*   CHLORIDE mmol/L 106 102  --  101   CO2 mmol/L 17.0* 19.0*  --  22.0   GLUCOSE mg/dL 93 98  --  95   BUN mg/dL 8 10  --  11   CREATININE mg/dL 0.56* 0.67*  --  0.71*          COAG:  Results from last 7 days   Lab Units 24  0414   INR  0.98       IMAGES:       Imaging Results (Last 24 Hours)       Procedure Component Value Units Date/Time    XR Chest 1 View [[id number]] Collected: 24     Updated: 24    Narrative:      EXAMINATION: XR CHEST 1 VW-  2024 6:29 AM     HISTORY: Right pleural effusion     FINDINGS: Upright frontal projection of the chest is " compared to  previous exam of 1 day earlier. The small caliber thoracostomy tube  again projects over the right lung base. This was placed into a  loculated collection within the posterior right pleural space. There is  diminished opacity within the right hemithorax suggesting interval  diminishment in size of the collection. No air-fluid levels are present.  There is no pneumothorax. Left basilar subsegmental atelectasis is  present.       Impression:      1. The pigtail catheter is slightly repositioned from the previous exam  but I feel remains present within the posterior pleural-based collection  in the right lung base. Overall diminished opacity within the right mid  and lower lung zone from the previous exam suggesting interval  diminishment in size of the loculated effusion. No pneumothorax.     This report was signed and finalized on 8/20/2024 6:31 AM by Dr. Jalen Mccarty MD.       XR Chest 1 View [[id number]] Collected: 08/19/24 1427     Updated: 08/19/24 1433    Narrative:      EXAMINATION:  XR CHEST 1 VW-  8/19/2024 1:17 PM     HISTORY: Right chest tube placement.     COMPARISON: 8/19/2024.     TECHNIQUE: Single view AP image.     FINDINGS: There is a new pigtail catheter overlying the right lung base.  There is no evidence of pneumothorax. There is pleural fluid on the  right. There is mild atelectasis in the left lung base. There is mild  bronchial wall thickening, stable. The heart is normal in size. No acute  bony abnormality is seen.          Impression:      1. New pigtail catheter overlying the right lung base. No evidence of  pneumothorax. Pleural fluid on the right appears relatively stable.  2. Mild atelectasis left lung base.           This report was signed and finalized on 8/19/2024 2:30 PM by Dr. Fer Jonas MD.       CT Guided Chest Tube [[id number]] Collected: 08/19/24 1125     Updated: 08/19/24 1134    Narrative:      CT-GUIDED CHEST TUBE PLACEMENT, right side     HISTORY: Male who  is 64 years-old, with a history of right-sided empyema     DOSE LENGTH PRODUCT: 905 mGy cm. Automated exposure control was also  utilized to decrease patient radiation dose.     The procedure, including but not limited to the risk of hemorrhage,  infection, lung injury was discussed with the patient prior to  examination, informed consent was obtained.     PROCEDURE: Multiple nonenhanced axial images were obtained for  localization purposes with the patient in the prone position.     Radiation dose reduction techniques were utilized, including automated  exposure control and exposure modulation based on body size.     Skin marker was placed along the right posterior lateral chest wall. CT  redemonstrated a residual loculated empyema along the right  posterior/posterior lateral chest wall. Skin was marked along the  intended site for tube placement.     SEDATION: Conscious sedation was provided with supervision by myself and  a certified nurse with 0.5 mg IV Versed and 50 mcg IV fentanyl.  Continuous monitoring of heart rhythm, blood pressure, and pulse  oximetry was performed throughout the procedure.      Patient was sterilely prepped and draped in the usual fashion. Following  adequate local anesthesia with 1% lidocaine, dermatotomy a small was  made and a 5F trocar guided Yueh was advanced into the pleural  collection to a predetermined depth, position confirmed with repeat CT.  Trocar was removed and a Bentson wire was advanced through the catheter  with subsequent discontinuation of the Yueh catheter over the wire.  Serial dilation of the soft tissues performed utilizing 5 soft tissue  dilators, the largest of which was 14 Montserratian. A 12 Montserratian locking  pigtail catheter was then advanced into the pleural space over the  Bentson wire. Metal stiffener was then removed with position of the  pigtail confirmed by CT. Bentson wire was then removed. Pigtail was  locked and the catheter was secured to the skin using the  provided  device. Short segment confirmatory CT then performed which showed  adequate position of the pigtail within the empyema. Patient tolerated  the procedure well. There were no immediate complications.       Impression:      1. Successful CT-guided right chest tube placement, 12  Urdu locking  pigtail catheter.   2. No immediate complication.     SEDATION: Conscious sedation was provided with supervision by myself and  a certified nurse with 0.5 mg IV Versed and 50 mcg IV fentanyl.  Continuous monitoring of heart rhythm, blood pressure, and pulse  oximetry was performed throughout the procedure. The first dose of  sedation was administered at 1011 hours, and my personal supervision of  the patient was completed at time of the procedure completion at 1041  hours. Total intraservice time was 30 minutes. Continued nursing  monitoring was performed per sedation protocol.     This report was signed and finalized on 8/19/2024 11:31 AM by Dr Ge Ferguson.           Chest x-ray: Decreased opacity in the right mid and lower lung, no pneumothorax.      Physical Exam:  General: Alert, oriented. No apparent distress.  Chronically ill-appearing  Cardiovascular: Regular rate and rhythm without murmur, rubs, or gallops.    Pulmonary: Clear to auscultation bilaterally without wheezing, rubs, or rales.  Chest: Right pleural pigtail catheter in place to -20 cm suction, no airleak  Abdomen: Soft, nondistended, and nontender.  Extremities: Warm, moves all extremities. No edema.   Neurologic:  Grossly intact with no focal deficits.            Impression:  Right pleural effusion  Generalized weakness  Weight loss        Plan:  Continue intrapleural tPA, day 2/3  Repeat CT chest Friday  Continue chest tube suction at -20 cm  Flush chest tube with 10 mL of normal saline every 8 hours  Daily chest x-ray  Continue protein supplements with each meal  Antibiotic management per infectious disease        Hilary Medina,  KRIS  08/20/24  07:44 CDT

## 2024-08-20 NOTE — CASE MANAGEMENT/SOCIAL WORK
Discharge Planning Assessment  [facility]     Patient Name: Ravin Garza  MRN: [id number]  Today's Date: 8/20/2024    Admit Date: 8/15/2024        Discharge Needs Assessment       Row Name 08/20/24 1009       Living Environment    People in Home alone    Current Living Arrangements home    Primary Care Provided by self    Family Caregiver if Needed sibling(s)       Transition Planning    Patient/Family Anticipates Transition to home       Discharge Needs Assessment    Equipment Currently Used at Home prosthesis;shower chair;walker, rolling    Concerns to be Addressed home safety    Anticipated Changes Related to Illness inability to care for self    Current Discharge Risk chronically ill;lives alone;physical impairment    Discharge Coordination/Progress Spoke with patient at bedside for dc planning. Patient lives alone and cares for self. Has a sister that can help when needed. Has DME needed including leg prosthesis. Does not use outpatient services. Patient wants to dc home and thinks he will be able to continue to care for self with help from sister. PT eval ordered and pending. Will continue to follow for therapy recommendations.                   Discharge Plan    No documentation.                 Continued Care and Services - Admitted Since 8/15/2024    No active coordination exists for this encounter.       Expected Discharge Date and Time       Expected Discharge Date Expected Discharge Time    Aug 17, 2024            Demographic Summary    No documentation.                  Functional Status    No documentation.                  Psychosocial    No documentation.                  Abuse/Neglect    No documentation.                  Legal    No documentation.                  Substance Abuse    No documentation.                  Patient Forms    No documentation.                     Merlina A Fletcher, RN

## 2024-08-20 NOTE — PLAN OF CARE
Goal Outcome Evaluation:              Outcome Evaluation: Nutrition follow up. BM today. ONS all meals and daily menu selections. Plan to go home with help from family and home health. Pt has had some days NPO in the last 72 hr due to chest tube. Receiving intrapleural tPA. No new PAB since admit. No new weight since admit. One meal 25% x 72 hr chart review, PO total fluid 560mL. Continue current nutrition care plan.

## 2024-08-20 NOTE — PLAN OF CARE
Goal Outcome Evaluation:   A&Ox4, on room air, resting in bed. No c/o pain or discomfort at this time. Chest tube site CDI- draining appropriately. Alteplase given. Hourly rounding. LBM- 8/20 AM. Fall risk- safety maintained. VSS. Will continue to monitor until change of shift.

## 2024-08-20 NOTE — PLAN OF CARE
Pt slept/rested comfortably overnight. A&Ox4. On RA. VSS- NSR 79-92 per tele. IV abx given but pt remains refusing maintenance fluids. Hourly rounding complete, safety maintained.     Alteplase + pulmozyme administered intrapleural per orders- chest tube clamped at 2120 and unclamped at 2320- total output post alteplase 170 mL. Total output from chest tube for the shift 297 mL. No complaints or discomfort at this time.

## 2024-08-21 ENCOUNTER — APPOINTMENT (OUTPATIENT)
Dept: GENERAL RADIOLOGY | Facility: HOSPITAL | Age: 64
End: 2024-08-21
Payer: MEDICARE

## 2024-08-21 LAB
ANION GAP SERPL CALCULATED.3IONS-SCNC: 11 MMOL/L (ref 5–15)
BACTERIA SPEC AEROBE CULT: NORMAL
BACTERIA SPEC AEROBE CULT: NORMAL
BACTERIA SPEC ANAEROBE CULT: NORMAL
BUN SERPL-MCNC: 8 MG/DL (ref 8–23)
BUN/CREAT SERPL: 12.7 (ref 7–25)
CALCIUM SPEC-SCNC: 7.3 MG/DL (ref 8.6–10.5)
CHLORIDE SERPL-SCNC: 109 MMOL/L (ref 98–107)
CO2 SERPL-SCNC: 15 MMOL/L (ref 22–29)
CREAT SERPL-MCNC: 0.63 MG/DL (ref 0.76–1.27)
DEPRECATED RDW RBC AUTO: 64.7 FL (ref 37–54)
EGFRCR SERPLBLD CKD-EPI 2021: 105.6 ML/MIN/1.73
ERYTHROCYTE [DISTWIDTH] IN BLOOD BY AUTOMATED COUNT: 26.5 % (ref 12.3–15.4)
GLUCOSE SERPL-MCNC: 89 MG/DL (ref 65–99)
HCT VFR BLD AUTO: 26.3 % (ref 37.5–51)
HGB BLD-MCNC: 7.4 G/DL (ref 13–17.7)
MAGNESIUM SERPL-MCNC: 1.7 MG/DL (ref 1.6–2.4)
MCH RBC QN AUTO: 21.4 PG (ref 26.6–33)
MCHC RBC AUTO-ENTMCNC: 28.1 G/DL (ref 31.5–35.7)
MCV RBC AUTO: 76.2 FL (ref 79–97)
PHOSPHATE SERPL-MCNC: 2.7 MG/DL (ref 2.5–4.5)
PLATELET # BLD AUTO: 314 10*3/MM3 (ref 140–450)
PMV BLD AUTO: 9 FL (ref 6–12)
POTASSIUM SERPL-SCNC: 4.2 MMOL/L (ref 3.5–5.2)
RBC # BLD AUTO: 3.45 10*6/MM3 (ref 4.14–5.8)
SODIUM SERPL-SCNC: 135 MMOL/L (ref 136–145)
WBC NRBC COR # BLD AUTO: 8.21 10*3/MM3 (ref 3.4–10.8)

## 2024-08-21 PROCEDURE — 99232 SBSQ HOSP IP/OBS MODERATE 35: CPT | Performed by: INTERNAL MEDICINE

## 2024-08-21 PROCEDURE — 71045 X-RAY EXAM CHEST 1 VIEW: CPT

## 2024-08-21 PROCEDURE — 83735 ASSAY OF MAGNESIUM: CPT | Performed by: FAMILY MEDICINE

## 2024-08-21 PROCEDURE — 25010000002 ALTEPLASE 2 MG RECONSTITUTED SOLUTION 1 EACH VIAL: Performed by: NURSE PRACTITIONER

## 2024-08-21 PROCEDURE — 97162 PT EVAL MOD COMPLEX 30 MIN: CPT

## 2024-08-21 PROCEDURE — 25010000002 PIPERACILLIN SOD-TAZOBACTAM PER 1 G: Performed by: INTERNAL MEDICINE

## 2024-08-21 PROCEDURE — 84100 ASSAY OF PHOSPHORUS: CPT | Performed by: FAMILY MEDICINE

## 2024-08-21 PROCEDURE — 85027 COMPLETE CBC AUTOMATED: CPT | Performed by: FAMILY MEDICINE

## 2024-08-21 PROCEDURE — 80048 BASIC METABOLIC PNL TOTAL CA: CPT | Performed by: FAMILY MEDICINE

## 2024-08-21 PROCEDURE — 74018 RADEX ABDOMEN 1 VIEW: CPT

## 2024-08-21 PROCEDURE — 99232 SBSQ HOSP IP/OBS MODERATE 35: CPT | Performed by: SURGERY

## 2024-08-21 RX ADMIN — FERROUS SULFATE TAB 325 MG (65 MG ELEMENTAL FE) 325 MG: 325 (65 FE) TAB at 08:31

## 2024-08-21 RX ADMIN — Medication 10 ML: at 22:46

## 2024-08-21 RX ADMIN — PIPERACILLIN AND TAZOBACTAM 4.5 G: 4; .5 INJECTION, POWDER, FOR SOLUTION INTRAVENOUS at 17:39

## 2024-08-21 RX ADMIN — Medication 10 ML: at 23:50

## 2024-08-21 RX ADMIN — FAMOTIDINE 20 MG: 20 TABLET, FILM COATED ORAL at 17:39

## 2024-08-21 RX ADMIN — Medication 10 ML: at 17:39

## 2024-08-21 RX ADMIN — Medication 10 ML: at 08:32

## 2024-08-21 RX ADMIN — ALTEPLASE 10 MG: 2.2 INJECTION, POWDER, LYOPHILIZED, FOR SOLUTION INTRAVENOUS at 09:43

## 2024-08-21 RX ADMIN — PIPERACILLIN AND TAZOBACTAM 4.5 G: 4; .5 INJECTION, POWDER, FOR SOLUTION INTRAVENOUS at 01:04

## 2024-08-21 RX ADMIN — ALTEPLASE 10 MG: 2.2 INJECTION, POWDER, LYOPHILIZED, FOR SOLUTION INTRAVENOUS at 20:21

## 2024-08-21 RX ADMIN — DORNASE ALFA 5 MG: 1 SOLUTION RESPIRATORY (INHALATION) at 20:21

## 2024-08-21 RX ADMIN — POTASSIUM CHLORIDE 20 MEQ: 750 CAPSULE, EXTENDED RELEASE ORAL at 17:39

## 2024-08-21 RX ADMIN — Medication 10 ML: at 08:33

## 2024-08-21 RX ADMIN — PIPERACILLIN AND TAZOBACTAM 4.5 G: 4; .5 INJECTION, POWDER, FOR SOLUTION INTRAVENOUS at 08:32

## 2024-08-21 RX ADMIN — ACETAMINOPHEN 650 MG: 325 TABLET ORAL at 21:54

## 2024-08-21 RX ADMIN — FAMOTIDINE 20 MG: 20 TABLET, FILM COATED ORAL at 08:31

## 2024-08-21 RX ADMIN — POTASSIUM CHLORIDE 20 MEQ: 750 CAPSULE, EXTENDED RELEASE ORAL at 08:31

## 2024-08-21 RX ADMIN — DORNASE ALFA 5 MG: 1 SOLUTION RESPIRATORY (INHALATION) at 09:43

## 2024-08-21 RX ADMIN — NICOTINE 1 PATCH: 21 PATCH, EXTENDED RELEASE TRANSDERMAL at 08:33

## 2024-08-21 NOTE — PROGRESS NOTES
Infectious Diseases Progress Note    Patient:  Ravin Garza  YOB: 1960  MRN: [id number]   Admit date: 8/15/2024   Admitting Physician: Hernan Benedict MD  Primary Care Physician: Rk Nelson MD    Chief Complaint/Interval History: He had chest tube placement via CT guidance.  Culture from prior chest tube drainage has grown Streptococcus mitis/oralis.  Discussed results with him.  He is sitting at bedside currently.  He has had a little bit of bleeding and oozing around chest tube site.  There is primarily some bloody material in the chest tube canister.  He is without fever.  He is hemodynamically stable.  Reviewed culture results with him.    Intake/Output Summary (Last 24 hours) at 8/21/2024 1454  Last data filed at 8/21/2024 0600  Gross per 24 hour   Intake 120 ml   Output 1020 ml   Net -900 ml     Allergies: No Known Allergies  Current Scheduled Medications:   alteplase (ACTIVASE) 10 mg in sodium chloride 0.9 % 30 mL Syringe, 10 mg, Intrapleural, BID   And  dornase alpha (PULMOZYME) 5 mg in sterile water (preservative free) 30 mL intrapleural syringe, 5 mg, Intrapleural, BID  famotidine, 20 mg, Oral, BID AC  ferrous sulfate, 325 mg, Oral, Daily With Breakfast  nicotine, 1 patch, Transdermal, Q24H  piperacillin-tazobactam, 4.5 g, Intravenous, Q8H  potassium chloride, 20 mEq, Oral, BID With Meals  sodium chloride, 10 mL, Intravenous, Q12H  sodium chloride, 10 mL, Intracatheter, Q8H      Pharmacy to Dose Zosyn,        Current PRN Medications:    acetaminophen **OR** acetaminophen **OR** acetaminophen    senna-docusate sodium **AND** polyethylene glycol **AND** bisacodyl **AND** bisacodyl    calcium carbonate    Calcium Replacement - Follow Nurse / BPA Driven Protocol    Magnesium Standard Dose Replacement - Follow Nurse / BPA Driven Protocol    melatonin    ondansetron    Pharmacy to Dose Zosyn    Phosphorus Replacement - Follow Nurse / BPA Driven Protocol    Potassium Replacement -  "Follow Nurse / BPA Driven Protocol    sodium chloride    sodium chloride    Review of Systems see HPI    Vital Signs:  Temp (24hrs), Av.9 °F (36.6 °C), Min:97.5 °F (36.4 °C), Max:98.1 °F (36.7 °C)    /84 (BP Location: Right arm, Patient Position: Lying)   Pulse 83   Temp 97.5 °F (36.4 °C) (Oral)   Resp 18   Ht 188 cm (74\")   Wt 77.9 kg (171 lb 12.8 oz)   SpO2 99%   BMI 22.06 kg/m²     Physical Exam  Vital signs - reviewed.  Line/IV (right arm peripheral) site - No erythema, warmth, induration, or tenderness.  Chest tube site without surrounding erythema  Skin without rash  Lungs with no crackles or wheezes    Lab Results:  CBC:   Results from last 7 days   Lab Units 24  0440 24  0501 24  1325 24  0413 24  0408 24  1308 24  0413   WBC 10*3/mm3 8.21 10.87* 12.29* 12.12* 8.08  --  7.76   HEMOGLOBIN g/dL 7.4* 7.3* 8.0* 8.1* 7.8* 7.2* 6.6*   HEMATOCRIT % 26.3* 25.5* 29.2* 29.5* 28.5* 25.6* 25.1*   PLATELETS 10*3/mm3 314 316 378 281 190  --  213     BMP:  Results from last 7 days   Lab Units 24  0440 24  0501 24  0432 24  2120 24  0413 24  0409 24  1308 24  0413   SODIUM mmol/L 135* 134* 133*  --  135* 136  --  137   POTASSIUM mmol/L 4.2 4.1 4.2 4.2 3.3* 3.5 3.4* 3.1*   CHLORIDE mmol/L 109* 106 102  --  101 100  --  98   CO2 mmol/L 15.0* 17.0* 19.0*  --  22.0 24.0  --  28.0   BUN mg/dL 8 8 10  --  11 11  --  14   CREATININE mg/dL 0.63* 0.56* 0.67*  --  0.71* 0.70*  --  0.72*   GLUCOSE mg/dL 89 93 98  --  95 82  --  102*   CALCIUM mg/dL 7.3* 7.8* 8.4*  --  8.8 9.5  --  10.4   ALT (SGPT) U/L  --  17  --   --   --  21  --  19     Culture Results:   Blood Culture   Date Value Ref Range Status   2024 No growth at 5 days  Final   2024 No growth at 5 days  Final     Body fluid culture-right pleural space from 2024:  Streptococcus mitis/oralis.  Susceptibility outlined below.  Susceptibility       " Streptococcus mitis / oralis     JESS     Ceftriaxone <=0.12 ug/ml Susceptible     Penicillin G <=0.06 ug/ml Susceptible     Vancomycin 0.5 ug/ml Susceptible            Radiology:     CT-guided chest tube placement August 19, 2024:  IMPRESSION:  1. Successful CT-guided right chest tube placement, 12  Swedish locking  pigtail catheter.   2. No immediate complication.     Chest x-ray August 19, 2024:  FINDINGS: There is a new pigtail catheter overlying the right lung base.  There is no evidence of pneumothorax. There is pleural fluid on the  right. There is mild atelectasis in the left lung base. There is mild  bronchial wall thickening, stable. The heart is normal in size. No acute  bony abnormality is seen.  IMPRESSION:  1. New pigtail catheter overlying the right lung base. No evidence of  pneumothorax. Pleural fluid on the right appears relatively stable.  2. Mild atelectasis left lung base.    Additional Studies Reviewed: None gynecologic cytology dated August 16, 2024-listed as active in process    Impression:   1.  Loculated right pleural effusion-empyema.  Streptococcus mitis oralis recovered from culture.  2.  History of renal cell cancer  3.  Tobacco use  4.  Weight loss  5.  Microcytic anemia  6.  Cirrhosis    Recommendations:   He seems to be showing improvement.  Will discontinue Zosyn after last dose today  Begin ceftriaxone 2 g IV daily starting tomorrow  Will continue IV antibiotic therapy while he remains hospitalized  Typically would plan 2 to 3-week course of IV antibiotic treatment  Ceftriaxone is easy to administer and can be dosed once daily-additional antibiotics after discharge can be arranged through home, through daily outpatient treatment, or through temporary nursing home/subacute rehab therapy.  When discharge date and location for postdischarge care identified, will work to get antibiotic orders in place.    Mickey Zamora MD

## 2024-08-21 NOTE — PROGRESS NOTES
"Patient name: Ravin Garza  Patient : 1960  VISIT # [id number]  MR #[id number]    Procedure: CT-guided right chest tube placement by radiology  Procedure Date: 2024      Subjective   Chief complaint: Weakness    Resting in bed.  Tolerating room air.  Right pigtail catheter in place to -20 cm suction, no airleak.  Intrapleural tPA continues.  Pain is well-controlled.  No complaints of shortness of breath.  He remains on Zosyn per ID    ROS: No fevers or chills.  Stable right side chest wall pain.  No shortness of breath       Objective     Visit Vitals  /86 (BP Location: Right arm, Patient Position: Lying)   Pulse 80   Temp 98.1 °F (36.7 °C) (Oral)   Resp 18   Ht 188 cm (74\")   Wt 77.9 kg (171 lb 12.8 oz)   SpO2 99%   BMI 22.06 kg/m²       Intake/Output Summary (Last 24 hours) at 2024 0743  Last data filed at 2024 0600  Gross per 24 hour   Intake 600 ml   Output 1020 ml   Net -420 ml   Right pleural pigtail catheter: 620 mL / 24 hours, serosanguineous    Lab:     CBC:  Results from last 7 days   Lab Units 24  0440 24  0501 24  1325   WBC 10*3/mm3 8.21 10.87* 12.29*   HEMATOCRIT % 26.3* 25.5* 29.2*   PLATELETS 10*3/mm3 314 316 378          BMP:  Results from last 7 days   Lab Units 24  0440 24  0501 24  0432   SODIUM mmol/L 135* 134* 133*   POTASSIUM mmol/L 4.2 4.1 4.2   CHLORIDE mmol/L 109* 106 102   CO2 mmol/L 15.0* 17.0* 19.0*   GLUCOSE mg/dL 89 93 98   BUN mg/dL 8 8 10   CREATININE mg/dL 0.63* 0.56* 0.67*          COAG:  Results from last 7 days   Lab Units 24  0414   INR  0.98       IMAGES:       Imaging Results (Last 24 Hours)       Procedure Component Value Units Date/Time    XR Chest 1 View [[id number]] Collected: 24     Updated: 24 0704    Narrative:      EXAMINATION: XR CHEST 1 VW-  2024 6:59 AM     HISTORY: Right pleural effusion.     FINDINGS: Today's exam is compared to previous study of 1 day earlier.  The " small-caliber right-sided thoracostomy tube remains well-positioned.  There is continued diminished opacity within the right lung base  suggesting continued diminishment in the right-sided pleural collection.  There is minimal air within the pleural space in the right lateral lung  base. There is mild pleural thickening or effusion within the right  lateral costophrenic angle and pleural space. The left lung is  hypoventilated but otherwise clear.       Impression:      1. No change in positioning of the pigtail catheter. Persistent  diminished opacity within the right lung base suggesting improving  right-sided pleural fluid collection.     This report was signed and finalized on 8/21/2024 7:00 AM by Dr. Jalen Mccarty MD.           Chest x-ray: Decreased opacity in the right mid and lower lung, no pneumothorax.      Physical Exam:  General: Alert, oriented. No apparent distress.  Chronically ill-appearing  Cardiovascular: Regular rate and rhythm without murmur, rubs, or gallops.    Pulmonary: Clear to auscultation bilaterally without wheezing, rubs, or rales.  Chest: Right pleural pigtail catheter in place to -20 cm suction, no airleak  Abdomen: Soft, nondistended, and nontender.  Extremities: Warm, moves all extremities. No edema.   Neurologic:  Grossly intact with no focal deficits.          Impression:  Right pleural effusion  Generalized weakness  Weight loss    Plan:  Continue intrapleural tPA  Repeat CT chest Friday  Continue chest tube suction at -20 cm  Flush chest tube with 10 mL of normal saline every 8 hours  Daily chest x-ray  Continue protein supplements with each meal  Antibiotic management per infectious disease  Discussed with patient      Hilary Medina, KRIS  08/21/24  07:43 CDT

## 2024-08-21 NOTE — PLAN OF CARE
Goal Outcome Evaluation:   A&Ox4, on room air, resting in bed comfortably. Chest tube site CDI- dsg change today. Alteplase given. Hourly rounding. VSS. Safety maintained. IV antibx given. Will continue to monitor until change of shift.

## 2024-08-21 NOTE — OP NOTE
Pigtail Chest Tube Note    Preoperative Diagnosis: Symptomatic right loculated pleural effusion  Malnutrition  Debility    Postoperative Diagnosis: Same    Procedure Performed: Pleural Drainage, percutaneous, with insertion of indwelling catheter    Surgeon: Presley Manzanares M.D.    Indications: Mr. Garza is a 64-year-old male who presented to hospital with debility failure to thrive.  He was found to have a loculated right pleural effusion suspected empyema.  Pigtail catheter was placed 2 days ago but it is unfortunately been pulled out and was unable to infuse intrapleural lytics.  He is not a surgical candidate.  Given this I discussed with him replacement of this at bedside today he understood the risk and benefits and agreed to proceed.    Findings: Unsuccessful placement of small bore chest tube, unable to advance catheter through rind    Procedure: Informed consent was obtained.  Patient was placed in sitting position.  Posterior right chest was prepped and draped in normal sterile fashion.  Ultrasound was used to identify loculated pleural effusion.  Local anesthesia was injected into the skin and intercostal muscle at the previously marked spot.  Small skin nick was made and while aspirating a needle was advanced into the pleural cavity.  Wire was advanced in the pleural cavity without difficulty.  Dilator was passed over the wire and tract dilated without difficulty.  Attempted to pass pericardiocentesis catheter over the wire but was unable to advance due to thick rind.  Attempted in 2 additional locations but again could not pass pericardiocentesis catheter successfully.  Given this procedure was aborted.  Dressing was placed.  Patient tolerated the procedure well and a stat chest x-ray was ordered.      Specimens: None    EBL: Minimal    Presley Manzanares MD  Cardiothoracic Surgeon

## 2024-08-21 NOTE — PROGRESS NOTES
"    [facility] Medicine Services  INPATIENT PROGRESS NOTE    Patient Name: Ravin Garza  Date of Admission: 8/15/2024  Today's Date: 08/21/24  Length of Stay: 6  Primary Care Physician: Rk Nelson MD    Subjective   Chief Complaint: Weakness  HPI   64-year-old male with history of traumatic brain injury, morbid renal accidents, renal cell carcinoma, alcohol abuse, left above-the-knee amputation, admitted to the hospital on 8/15/2024, complaining of generalized weakness, weight loss, poor appetite.  History admitted with anemia, and imaging studies showing possible right-sided empyema.  He was transferred to [facility] for management.    Patient had a pigtail catheter placed on 8/16/2024.  Apparently not draining appropriately and had a repeat procedure pigtail catheter placement August 19, 2024.    I started taking care of this patient on 8/20/2024.    He does report abdominal pain that is mild, with associated \"tightness\".  This has been present for several days.  He reports no constipation.  No vomiting.  No fevers.        Review of Systems   All pertinent negatives and positives are as above. All other systems have been reviewed and are negative unless otherwise stated.     Objective    Temp:  [97.8 °F (36.6 °C)-98.1 °F (36.7 °C)] 98 °F (36.7 °C)  Heart Rate:  [] 81  Resp:  [18] 18  BP: (107-131)/(77-90) 130/89  Physical Exam  Constitutional:       Appearance: Chronically ill-appearing..  Alert oriented  HENT:      Head: Normocephalic and atraumatic.      Nose: Nose normal.      Mouth/Throat:      Mouth: Mucous membranes are moist.      Pharynx: Oropharynx is clear.   Eyes:      Extraocular Movements: Extraocular movements intact.      Conjunctiva/sclera: Conjunctivae normal.      Pupils: Pupils are equal, round, and reactive to light.   Cardiovascular:      Rate and Rhythm: Normal rate and regular rhythm.      Pulses: Normal pulses.   Pulmonary: Pigtail in place on " the right posterior thorax.     Effort: No respiratory distress.      Breath sounds: Decreased breath sounds.  Abdominal:      General: Abdomen is flat. Bowel sounds are normal.      Palpations: Abdomen is soft.      Tenderness: There is no guarding or rebound.   Musculoskeletal:         General: Normal range of motion.      Cervical back: Normal range of motion and neck supple.   Extremities: Left above-the-knee amputation status.  Right lower extremity with no edema  Skin:     Capillary Refill: Capillary refill takes less than 2 seconds.      Coloration: Skin is not jaundiced.      Findings: No rash.   Neurological:      General: No focal deficit present.      Mental Status: Patient is alert, oriented to place time and person.     Sensory: No sensory deficit.      Motor: No weakness.      Coordination: Coordination normal.   Psychiatric:         Mood and Affect: Mood normal.         Behavior: Behavior normal.           Results Review:  I have reviewed the labs, radiology results, and diagnostic studies.    Laboratory Data:   Results from last 7 days   Lab Units 08/21/24  0440 08/20/24  0501 08/19/24  1325   WBC 10*3/mm3 8.21 10.87* 12.29*   HEMOGLOBIN g/dL 7.4* 7.3* 8.0*   HEMATOCRIT % 26.3* 25.5* 29.2*   PLATELETS 10*3/mm3 314 316 378        Results from last 7 days   Lab Units 08/21/24  0440 08/20/24  0501 08/19/24  0432 08/18/24  0413 08/17/24  0409 08/16/24  1308 08/16/24  0413   SODIUM mmol/L 135* 134* 133*   < > 136  --  137   POTASSIUM mmol/L 4.2 4.1 4.2   < > 3.5   < > 3.1*   CHLORIDE mmol/L 109* 106 102   < > 100  --  98   CO2 mmol/L 15.0* 17.0* 19.0*   < > 24.0  --  28.0   BUN mg/dL 8 8 10   < > 11  --  14   CREATININE mg/dL 0.63* 0.56* 0.67*   < > 0.70*  --  0.72*   CALCIUM mg/dL 7.3* 7.8* 8.4*   < > 9.5  --  10.4   BILIRUBIN mg/dL  --  0.7  --   --  0.7  --  0.6   ALK PHOS U/L  --  146*  --   --  158*  --  152*   ALT (SGPT) U/L  --  17  --   --  21  --  19   AST (SGOT) U/L  --  30  --   --  43*  --   42*   GLUCOSE mg/dL 89 93 98   < > 82  --  102*    < > = values in this interval not displayed.       Culture Data:   Blood Culture   Date Value Ref Range Status   08/16/2024 No growth at 3 days  Preliminary   08/16/2024 No growth at 3 days  Preliminary       Radiology Data:   Imaging Results (Last 24 Hours)       Procedure Component Value Units Date/Time    XR Chest 1 View [[id number]] Collected: 08/21/24 0659     Updated: 08/21/24 0704    Narrative:      EXAMINATION: XR CHEST 1 VW-  8/21/2024 6:59 AM     HISTORY: Right pleural effusion.     FINDINGS: Today's exam is compared to previous study of 1 day earlier.  The small-caliber right-sided thoracostomy tube remains well-positioned.  There is continued diminished opacity within the right lung base  suggesting continued diminishment in the right-sided pleural collection.  There is minimal air within the pleural space in the right lateral lung  base. There is mild pleural thickening or effusion within the right  lateral costophrenic angle and pleural space. The left lung is  hypoventilated but otherwise clear.       Impression:      1. No change in positioning of the pigtail catheter. Persistent  diminished opacity within the right lung base suggesting improving  right-sided pleural fluid collection.     This report was signed and finalized on 8/21/2024 7:00 AM by Dr. Jalen Mccarty MD.               I have reviewed the patient's current medications.     Assessment/Plan   Assessment  Active Hospital Problems    Diagnosis     **Empyema lung     Microcytic anemia     Hypomagnesemia     Weight loss     History of traumatic brain injury     Abnormal finding on imaging - outpatient ct abd w/ ? empyema     Hypercalcemia     Severe malnutrition     Generalized weakness      Right pleural effusion/empyema  Hypophosphatemia  Hypomagnesemia  History of left above-the-knee amputation  Severe malnutrition  Alcohol abuse disorder  Chronic microcytic anemia, iron  deficiency      Today, potassium 4.2, CO2 15.  BUN 8, creatinine 0.63.  Magnesium 1.7, phosphorus 2.7.    White blood cell count down to 8210.  Hemoglobin 7.4, hematocrit 26.3, platelet count 314,000    Chest tube drainage reported 620 mL    Pleural fluid culture from 8/16/2024, growth of Streptococcus mitis  Cytology body fluid pending    Treatment Plan    Patient currently on Zosyn, started on 8/16/2024, day #5.  Continue antibiotic treatment per infectious diseases.    Follow hemoglobin hematocrit, transfuse PRBCa if below 7.    Replace electrolytes.  Follow levels.    KUB requested.    Daily chest x-rays  Chest tube to suction.  Patient receiving tPA via pigtail catheter, today is day 3/3.  CT scan of the chest next Friday as per cardiothoracic surgery recommendations.        Medical Decision Making  Number and Complexity of problems: 7, moderate to high complexity  Differential Diagnosis: See above    Conditions and Status        Condition is unchanged.     MDM Data  External documents reviewed: None  Cardiac tracing (EKG, telemetry) interpretation: No new  Radiology interpretation: Radiology reports reviewed  Labs reviewed: Yes  Any tests that were considered but not ordered: No     Decision rules/scores evaluated (example [id number], Wells, etc): See chart     Discussed with: Patient     Care Planning  Shared decision making: With patient  Code status and discussions: No CPR    Disposition  Social Determinants of Health that impact treatment or disposition: Alcohol use  I expect the patient to be discharged once pigtail removed.     Electronically signed by Hernan Benedict MD, 08/21/24, 09:49 CDT.

## 2024-08-21 NOTE — PLAN OF CARE
Problem: Adult Inpatient Plan of Care  Goal: Plan of Care Review  Outcome: Ongoing, Progressing  Goal: Patient-Specific Goal (Individualized)  Outcome: Ongoing, Progressing  Goal: Absence of Hospital-Acquired Illness or Injury  Outcome: Ongoing, Progressing  Intervention: Identify and Manage Fall Risk  Recent Flowsheet Documentation  Taken 8/21/2024 0400 by Kd Ortez RN  Safety Promotion/Fall Prevention: safety round/check completed  Taken 8/21/2024 0200 by Kd Ortez RN  Safety Promotion/Fall Prevention: safety round/check completed  Taken 8/21/2024 0000 by Kd Ortez RN  Safety Promotion/Fall Prevention: safety round/check completed  Taken 8/20/2024 2200 by Kd Ortez RN  Safety Promotion/Fall Prevention: safety round/check completed  Taken 8/20/2024 2100 by Kd Ortez RN  Safety Promotion/Fall Prevention: safety round/check completed  Taken 8/20/2024 2000 by Kd Ortez RN  Safety Promotion/Fall Prevention: safety round/check completed  Taken 8/20/2024 1900 by Kd Ortez RN  Safety Promotion/Fall Prevention: safety round/check completed  Intervention: Prevent and Manage VTE (Venous Thromboembolism) Risk  Recent Flowsheet Documentation  Taken 8/20/2024 2115 by Kd Ortez RN  Range of Motion: active ROM (range of motion) encouraged  Goal: Optimal Comfort and Wellbeing  Outcome: Ongoing, Progressing  Intervention: Provide Person-Centered Care  Recent Flowsheet Documentation  Taken 8/20/2024 2115 by Kd Ortez RN  Trust Relationship/Rapport:   care explained   choices provided   emotional support provided   empathic listening provided   questions answered   questions encouraged  Goal: Readiness for Transition of Care  Outcome: Ongoing, Progressing     Problem: Anemia  Goal: Anemia Symptom Improvement  Outcome: Ongoing, Progressing  Intervention: Monitor and Manage Anemia  Recent Flowsheet Documentation  Taken 8/21/2024 0400 by Kd Ortez RN  Safety Promotion/Fall Prevention: safety round/check  completed  Taken 8/21/2024 0200 by Kd Ortez RN  Safety Promotion/Fall Prevention: safety round/check completed  Taken 8/21/2024 0000 by Kd Ortez RN  Safety Promotion/Fall Prevention: safety round/check completed  Taken 8/20/2024 2200 by Kd Ortez RN  Safety Promotion/Fall Prevention: safety round/check completed  Taken 8/20/2024 2100 by Kd Ortez RN  Safety Promotion/Fall Prevention: safety round/check completed  Taken 8/20/2024 2000 by Kd Ortez RN  Safety Promotion/Fall Prevention: safety round/check completed  Taken 8/20/2024 1900 by Kd Ortez RN  Safety Promotion/Fall Prevention: safety round/check completed     Problem: Malnutrition  Goal: Improved Nutritional Intake  Outcome: Ongoing, Progressing     Problem: Fall Injury Risk  Goal: Absence of Fall and Fall-Related Injury  Outcome: Ongoing, Progressing  Intervention: Promote Injury-Free Environment  Recent Flowsheet Documentation  Taken 8/21/2024 0400 by Kd Ortez RN  Safety Promotion/Fall Prevention: safety round/check completed  Taken 8/21/2024 0200 by Kd Ortez RN  Safety Promotion/Fall Prevention: safety round/check completed  Taken 8/21/2024 0000 by Kd Ortez RN  Safety Promotion/Fall Prevention: safety round/check completed  Taken 8/20/2024 2200 by Kd Ortez RN  Safety Promotion/Fall Prevention: safety round/check completed  Taken 8/20/2024 2100 by Kd Ortez RN  Safety Promotion/Fall Prevention: safety round/check completed  Taken 8/20/2024 2000 by Kd Ortez RN  Safety Promotion/Fall Prevention: safety round/check completed  Taken 8/20/2024 1900 by Kd Ortez RN  Safety Promotion/Fall Prevention: safety round/check completed   Goal Outcome Evaluation:         Patient is a/o x 4. Patient is on sodium phosphates protocol. Patient's dressing is C/D/I. Patient did have once complaint of pain overnight. Patient's chest tube appears to be leak free. Patient did not sleep much overnight. Patient did not have any adverse event occur.

## 2024-08-21 NOTE — PLAN OF CARE
Goal Outcome Evaluation:  Plan of Care Reviewed With: patient           Outcome Evaluation: PT eval complete. Pt in L sidelying upon arrival, AOx4 and states he is beginning to feel improvement. Pt reports independence at baseline but also has family clsoe by that check on him daily. Pt reports over the past few days he has not moved at all and could not get OOB prior to admission. Today pt performed R roll, supine>sit with SBA. In sitting pt demo functional AROM in B LE, and demo ability to maintain static and dynamic balance to don prosthetic. Pt LE strength remains WFL and graded between 4- to 4/5. Pt performed sit<>stand CGA and took 10 side steps EOB. Pt reports he is quick to fatigue  but feels improvement as he was unable to stand a few days ago. Pt remaind hooked to wall suction during session which limited total distance and a scheduled dressing change at chest tube site, and pt left sitting EOB. Pt will benefit from skilled PT services to improve gait safety, decrease fall risk and return to PLOF. Recommend Home with assist from family and HH when medically stable pending stable progress.      Anticipated Discharge Disposition (PT): home with assist, home with home health

## 2024-08-21 NOTE — THERAPY EVALUATION
Patient Name: Ravin Garza  : 1960    MRN: [id number]                              Today's Date: 2024       Admit Date: 8/15/2024    Visit Dx:     ICD-10-CM ICD-9-CM   1. Impaired mobility [Z74.09]  Z74.09 799.89     Patient Active Problem List   Diagnosis    Generalized weakness    Microcytic anemia    Hypomagnesemia    Weight loss    History of traumatic brain injury    Abnormal finding on imaging - outpatient ct abd w/ ? empyema    Hypercalcemia    Severe malnutrition    Empyema lung     Past Medical History:   Diagnosis Date    Amputee, above knee     bilateral    ETOH abuse      History reviewed. No pertinent surgical history.   General Information       Row Name 24 1401          Physical Therapy Time and Intention    Document Type evaluation  presents with inability to move OOB dx; empyema of R Lung, plueral effusion, and anemia. H/o L BKA and abnormal electrolyte levels. Pt currently has chest tube on R chest  -     Mode of Treatment physical therapy  -       Row Name 24 1401          General Information    Patient Profile Reviewed yes  -AJ     Prior Level of Function independent:;all household mobility;community mobility;home management;gait;ADL's  pt states he has family that lives close by that check on him as well  -     Existing Precautions/Restrictions fall;other (see comments)  Chest tube R sided, L BKA  -     Barriers to Rehab medically complex;physical barrier;previous functional deficit  -       Row Name 24 1401          Living Environment    People in Home alone  -       Row Name 24 1401          Home Main Entrance    Number of Stairs, Main Entrance none  -       Row Name 24 1401          Cognition    Orientation Status (Cognition) oriented x 4  -       Row Name 24 1401          Safety Issues, Functional Mobility    Safety Issues Affecting Function (Mobility) awareness of need for assistance  -     Impairments Affecting Function  (Mobility) strength;endurance/activity tolerance;pain;balance  -               User Key  (r) = Recorded By, (t) = Taken By, (c) = Cosigned By      Initials Name Provider Type    rPesley Hunt PT DPANETA Physical Therapist                   Mobility       Row Name 08/21/24 1401          Bed Mobility    Bed Mobility rolling right;supine-sit  -AJ     Rolling Right [place] (Bed Mobility) standby assist  -CALEB     Supine-Sit [place] (Bed Mobility) standby assist;other (see comments)  a  -CALEB     Assistive Device (Bed Mobility) bed rails;head of bed elevated  -       Row Name 08/21/24 1401          Bed-Chair Transfer    Bed-Chair [place] (Transfers) not tested  -       Row Name 08/21/24 1401          Sit-Stand Transfer    Sit-Stand [place] (Transfers) contact guard;1 person assist  -CALEB     Assistive Device (Sit-Stand Transfers) other (see comments)  HHA  -       Row Name 08/21/24 1401          Gait/Stairs (Locomotion)    [place] Level (Gait) contact guard  -     Assistive Device (Gait) other (see comments)  A  -     Distance in Feet (Gait) 10  10 side steps EOB  -CALEB     Left Sided Gait Deviations leans left;lateral trunk flexion  -               User Key  (r) = Recorded By, (t) = Taken By, (c) = Cosigned By      Initials Name Provider Type    Presley Hunt PT DPANETA Physical Therapist                   Obj/Interventions       Row Name 08/21/24 1401          Range of Motion Comprehensive    General Range of Motion bilateral lower extremity ROM WFL  -CALEB     Comment, General Range of Motion remains WFL B LE, does have h/o L BKA AROM to functional limits due to porsthetic  -       Row Name 08/21/24 1401          Strength Comprehensive (MMT)    General Manual Muscle Testing (MMT) Assessment lower extremity strength deficits identified  -CALEB     Comment, General Manual Muscle Testing (MMT) Assessment globally B hip 4-/5, R knee ext 4/5, R ankle WFL 5/5,  -CALEB       Row Name 08/21/24 1401           Balance    Balance Assessment sitting static balance;sitting dynamic balance;standing static balance;standing dynamic balance  -AJ     Static Sitting Balance independent  -AJ     Dynamic Sitting Balance independent  -AJ     Position, Sitting Balance unsupported;sitting edge of bed  -AJ     Static Standing Balance standby assist  -AJ     Dynamic Standing Balance contact guard  -AJ     Position/Device Used, Standing Balance supported;unsupported  -AJ     Balance Interventions sitting;standing;sit to stand;supported;static;dynamic  -AJ       Row Name 08/21/24 1401          Sensory Assessment (Somatosensory)    Sensory Assessment (Somatosensory) not tested  -AJ               User Key  (r) = Recorded By, (t) = Taken By, (c) = Cosigned By      Initials Name Provider Type    AJ Presley Armenta, PT DPT Physical Therapist                   Goals/Plan       Row Name 08/21/24 1401          Bed Mobility Goal 1 (PT)    Activity/Assistive Device (Bed Mobility Goal 1, PT) rolling to left;rolling to right;sit to supine;supine to sit  -AJ     [place] Level/Cues Needed (Bed Mobility Goal 1, PT) independent  -AJ     Time Frame (Bed Mobility Goal 1, PT) long term goal (LTG);10 days  -AJ     Progress/Outcomes (Bed Mobility Goal 1, PT) new goal  -AJ       Row Name 08/21/24 1401          Transfer Goal 1 (PT)    Activity/Assistive Device (Transfer Goal 1, PT) sit-to-stand/stand-to-sit;bed-to-chair/chair-to-bed;toilet;tub;tub bench  -AJ     [place] Level/Cues Needed (Transfer Goal 1, PT) independent  -AJ     Time Frame (Transfer Goal 1, PT) long term goal (LTG);10 days  -AJ     Strategies/Barriers (Transfers Goal 1, PT) independent if plan is to return home  -AJ     Progress/Outcome (Transfer Goal 1, PT) new goal  -AJ       Row Name 08/21/24 1401          Gait Training Goal 1 (PT)    Activity/Assistive Device (Gait Training Goal 1, PT) gait (walking locomotion);decrease asymmetrical patterns;decrease fall risk;diminish gait  deviation;forward stepping;improve balance and speed;increase endurance/gait distance;increase energy conservation;assistive device use;walker, rolling  -AJ     [place] Level (Gait Training Goal 1, PT) independent  -AJ     Distance (Gait Training Goal 1, PT) 50' as needed for home safety and pain 0/10  -AJ     Time Frame (Gait Training Goal 1, PT) long term goal (LTG);10 days  -AJ     Progress/Outcome (Gait Training Goal 1, PT) new goal  -AJ       Row Name 08/21/24 1401          Therapy Assessment/Plan (PT)    Planned Therapy Interventions (PT) balance training;bed mobility training;gait training;home exercise program;patient/family education;transfer training;prosthetic fitting/training;postural re-education;strengthening;ROM (range of motion)  -AJ               User Key  (r) = Recorded By, (t) = Taken By, (c) = Cosigned By      Initials Name Provider Type    Presley Hunt, PT DPT Physical Therapist                   Clinical Impression       Row Name 08/21/24 1401          Pain    Pretreatment Pain Rating 4/10  -AJ     Pain Location - Side/Orientation Right  -AJ     Pain Location generalized  -AJ     Pain Location - chest  -AJ     Pre/Posttreatment Pain Comment chest tube insertion, discomfort> pain  -AJ     Pain Intervention(s) Ambulation/increased activity;Nursing Notified;Repositioned  -AJ     Additional Documentation Pain Scale: Numbers Pre/Post-Treatment (Group)  -AJ       Row Name 08/21/24 1401          Plan of Care Review    Plan of Care Reviewed With patient  -AJ     Outcome Evaluation PT eval complete. Pt in L sidelying upon arrival, AOx4 and states he is beginning to feel improvement. Pt reports independence at baseline but also has family clsoe by that check on him daily. Pt reports over the past few days he has not moved at all and could not get OOB prior to admission. Today pt performed R roll, supine>sit with SBA. In sitting pt demo functional AROM in B LE, and demo ability to maintain static  and dynamic balance to don prosthetic. Pt LE strength remains WFL and graded between 4- to 4/5. Pt performed sit<>stand CGA and took 10 side steps EOB. Pt reports he is quick to fatigue  but feels improvement as he was unable to stand a few days ago. Pt remaind hooked to wall suction during session which limited total distance and a scheduled dressing change at chest tube site, and pt left sitting EOB. Pt will benefit from skilled PT services to improve gait safety, decrease fall risk and return to PLOF. Recommend Home with assist from family and HH when medically stable pending stable progress.  -       Row Name 08/21/24 1401          Therapy Assessment/Plan (PT)    Patient/Family Therapy Goals Statement (PT) get back home  -     Rehab Potential (PT) fair, will monitor progress closely  -     Criteria for Skilled Interventions Met (PT) yes;meets criteria;skilled treatment is necessary  -     Therapy Frequency (PT) 2 times/day  -     Predicted Duration of Therapy Intervention (PT) until d/c  -       Row Name 08/21/24 1401          Vital Signs    Pre Patient Position Supine  -AJ     Intra Patient Position Standing  -AJ     Post Patient Position Sitting  -       Row Name 08/21/24 1401          Positioning and Restraints    Pre-Treatment Position in bed  -AJ     Post Treatment Position bed  -AJ     In Bed notified nsg;sitting;sitting EOB;encouraged to call for assist;call light within reach  -               User Key  (r) = Recorded By, (t) = Taken By, (c) = Cosigned By      Initials Name Provider Type    Presley Hunt, PT DPT Physical Therapist                   Outcome Measures       Row Name 08/21/24 1401 08/21/24 0715       How much help from another person do you currently need...    Turning from your back to your side while in flat bed without using bedrails? 4  -AJ 4  -AR    Moving from lying on back to sitting on the side of a flat bed without bedrails? 4  -AJ 4  -AR    Moving to and from a  bed to a chair (including a wheelchair)? 3  -AJ 4  -AR    Standing up from a chair using your arms (e.g., wheelchair, bedside chair)? 4  -AJ 3  -AR    Climbing 3-5 steps with a railing? 1  -AJ 3  -AR    To walk in hospital room? 3  -AJ 2  -AR    AM-PAC 6 Clicks Score (PT) 19  -AJ 20  -AR    Highest Level of Mobility Goal 6 --> Walk 10 steps or more  -AJ 6 --> Walk 10 steps or more  -AR      Row Name 08/21/24 1401          Functional Assessment    Outcome Measure Options AM-PAC 6 Clicks Basic Mobility (PT)  -               User Key  (r) = Recorded By, (t) = Taken By, (c) = Cosigned By      Initials Name Provider Type    Marcelina Munoz, RN Registered Nurse    Presley Hunt, PT DPT Physical Therapist                                 Physical Therapy Education       Title: PT OT SLP Therapies (Done)       Topic: Physical Therapy (Done)       Point: Mobility training (Done)       Learning Progress Summary             Patient Acceptance, E, VU by CALEB at 8/21/2024 1543    Comment: role of PT, d/c plans if pt wants to return home, assist for OOB activity                         Point: Home exercise program (Done)       Learning Progress Summary             Patient Acceptance, E, VU by CALEB at 8/21/2024 1543    Comment: role of PT, d/c plans if pt wants to return home, assist for OOB activity                         Point: Body mechanics (Done)       Learning Progress Summary             Patient Acceptance, E, VU by CALEB at 8/21/2024 1543    Comment: role of PT, d/c plans if pt wants to return home, assist for OOB activity                         Point: Precautions (Done)       Learning Progress Summary             Patient Acceptance, E, VU by CALEB at 8/21/2024 1543    Comment: role of PT, d/c plans if pt wants to return home, assist for OOB activity                                         User Key       Initials Effective Dates Name Provider Type [facility]    CALEB 08/15/24 -  Presley Armenta, PT DPT Physical Therapist PT                   PT Recommendation and Plan  Planned Therapy Interventions (PT): balance training, bed mobility training, gait training, home exercise program, patient/family education, transfer training, prosthetic fitting/training, postural re-education, strengthening, ROM (range of motion)  Plan of Care Reviewed With: patient  Outcome Evaluation: PT eval complete. Pt in L sidelying upon arrival, AOx4 and states he is beginning to feel improvement. Pt reports independence at baseline but also has family clsoe by that check on him daily. Pt reports over the past few days he has not moved at all and could not get OOB prior to admission. Today pt performed R roll, supine>sit with SBA. In sitting pt demo functional AROM in B LE, and demo ability to maintain static and dynamic balance to don prosthetic. Pt LE strength remains WFL and graded between 4- to 4/5. Pt performed sit<>stand CGA and took 10 side steps EOB. Pt reports he is quick to fatigue  but feels improvement as he was unable to stand a few days ago. Pt remaind hooked to wall suction during session which limited total distance and a scheduled dressing change at chest tube site, and pt left sitting EOB. Pt will benefit from skilled PT services to improve gait safety, decrease fall risk and return to PLOF. Recommend Home with assist from family and HH when medically stable pending stable progress.     Time Calculation:         PT Charges       Row Name 08/21/24 1401             Time Calculation    Start Time 1401  -AJ      Stop Time 1429  +13 for chart review  -AJ      Time Calculation (min) 28 min  -AJ      PT Received On 08/21/24  -AJ      PT Goal Re-Cert Due Date 08/31/24  -AJ         Untimed Charges    PT Eval/Re-eval Minutes 41  -AJ         Total Minutes    Untimed Charges Total Minutes 41  -AJ       Total Minutes 41  -AJ                User Key  (r) = Recorded By, (t) = Taken By, (c) = Cosigned By      Initials Name Provider Type    Presley Hunt, PT DPT  Physical Therapist                  Therapy Charges for Today       Code Description Service Date Service Provider Modifiers Qty    78139382554 HC PT EVAL MOD COMPLEXITY 3 8/21/2024 Presley Armenta, PT DPT GP 1            PT G-Codes  Outcome Measure Options: AM-PAC 6 Clicks Basic Mobility (PT)  AM-PAC 6 Clicks Score (PT): 19  PT Discharge Summary  Anticipated Discharge Disposition (PT): home with assist, home with home health    Presley Armenta PT DPT  8/21/2024

## 2024-08-22 ENCOUNTER — APPOINTMENT (OUTPATIENT)
Dept: GENERAL RADIOLOGY | Facility: HOSPITAL | Age: 64
End: 2024-08-22
Payer: MEDICARE

## 2024-08-22 LAB
HCT VFR BLD AUTO: 34.6 % (ref 37.5–51)
HGB BLD-MCNC: 9.4 G/DL (ref 13–17.7)

## 2024-08-22 PROCEDURE — 97116 GAIT TRAINING THERAPY: CPT

## 2024-08-22 PROCEDURE — 99232 SBSQ HOSP IP/OBS MODERATE 35: CPT | Performed by: SURGERY

## 2024-08-22 PROCEDURE — 25010000002 CEFTRIAXONE PER 250 MG: Performed by: FAMILY MEDICINE

## 2024-08-22 PROCEDURE — 85018 HEMOGLOBIN: CPT | Performed by: FAMILY MEDICINE

## 2024-08-22 PROCEDURE — 99232 SBSQ HOSP IP/OBS MODERATE 35: CPT | Performed by: INTERNAL MEDICINE

## 2024-08-22 PROCEDURE — 71045 X-RAY EXAM CHEST 1 VIEW: CPT

## 2024-08-22 PROCEDURE — 25010000002 ALTEPLASE 2 MG RECONSTITUTED SOLUTION 1 EACH VIAL: Performed by: NURSE PRACTITIONER

## 2024-08-22 PROCEDURE — 97530 THERAPEUTIC ACTIVITIES: CPT

## 2024-08-22 PROCEDURE — 85014 HEMATOCRIT: CPT | Performed by: FAMILY MEDICINE

## 2024-08-22 RX ORDER — SIMETHICONE 80 MG
80 TABLET,CHEWABLE ORAL 4 TIMES DAILY PRN
Status: DISCONTINUED | OUTPATIENT
Start: 2024-08-22 | End: 2024-08-28 | Stop reason: HOSPADM

## 2024-08-22 RX ADMIN — ALTEPLASE 10 MG: 2.2 INJECTION, POWDER, LYOPHILIZED, FOR SOLUTION INTRAVENOUS at 09:55

## 2024-08-22 RX ADMIN — FAMOTIDINE 20 MG: 20 TABLET, FILM COATED ORAL at 17:24

## 2024-08-22 RX ADMIN — SIMETHICONE 80 MG: 80 TABLET, CHEWABLE ORAL at 17:24

## 2024-08-22 RX ADMIN — POTASSIUM CHLORIDE 20 MEQ: 750 CAPSULE, EXTENDED RELEASE ORAL at 17:24

## 2024-08-22 RX ADMIN — Medication 10 ML: at 20:44

## 2024-08-22 RX ADMIN — Medication 10 ML: at 16:52

## 2024-08-22 RX ADMIN — CEFTRIAXONE SODIUM 2000 MG: 2 INJECTION, POWDER, FOR SOLUTION INTRAMUSCULAR; INTRAVENOUS at 08:47

## 2024-08-22 RX ADMIN — FAMOTIDINE 20 MG: 20 TABLET, FILM COATED ORAL at 08:51

## 2024-08-22 RX ADMIN — SIMETHICONE 80 MG: 80 TABLET, CHEWABLE ORAL at 19:52

## 2024-08-22 RX ADMIN — NICOTINE 1 PATCH: 21 PATCH, EXTENDED RELEASE TRANSDERMAL at 08:51

## 2024-08-22 RX ADMIN — ACETAMINOPHEN 650 MG: 325 TABLET ORAL at 22:50

## 2024-08-22 RX ADMIN — POTASSIUM CHLORIDE 20 MEQ: 750 CAPSULE, EXTENDED RELEASE ORAL at 08:47

## 2024-08-22 RX ADMIN — FERROUS SULFATE TAB 325 MG (65 MG ELEMENTAL FE) 325 MG: 325 (65 FE) TAB at 08:47

## 2024-08-22 RX ADMIN — DORNASE ALFA 5 MG: 1 SOLUTION RESPIRATORY (INHALATION) at 20:37

## 2024-08-22 RX ADMIN — Medication 10 ML: at 08:47

## 2024-08-22 RX ADMIN — SIMETHICONE 80 MG: 80 TABLET, CHEWABLE ORAL at 13:49

## 2024-08-22 RX ADMIN — ALTEPLASE 10 MG: 2.2 INJECTION, POWDER, LYOPHILIZED, FOR SOLUTION INTRAVENOUS at 20:37

## 2024-08-22 RX ADMIN — DORNASE ALFA 5 MG: 1 SOLUTION RESPIRATORY (INHALATION) at 09:55

## 2024-08-22 NOTE — PLAN OF CARE
Goal Outcome Evaluation:  Plan of Care Reviewed With: patient        Progress: improving  Outcome Evaluation: VSS, tele dc'd this shift. Chest tube in place. TPA given through tube x1 this shift. Large BM this shift. No c/o pain .Call light in place. Saftey maintained

## 2024-08-22 NOTE — PROGRESS NOTES
"Patient name: Ravin Garza  Patient : 1960  VISIT # [id number]  MR #[id number]    Procedure: CT-guided right chest tube placement by radiology  Procedure Date: 2024      Subjective   Chief complaint: Weakness    Resting in bed.  Tolerating room air.  Right pigtail catheter in place to -20 cm suction, no airleak.  Intrapleural tPA continues.  Pain is well-controlled. Reports improved shortness of breath.  Completed 5-day course of Zosyn yesterday.     ROS: No fevers or chills.  Stable right side chest wall pain.  No shortness of breath       Objective     Visit Vitals  /85 (BP Location: Right arm, Patient Position: Lying)   Pulse 94   Temp 97.9 °F (36.6 °C) (Oral)   Resp 19   Ht 188 cm (74\")   Wt 77.9 kg (171 lb 12.8 oz)   SpO2 100%   BMI 22.06 kg/m²       Intake/Output Summary (Last 24 hours) at 2024 0708  Last data filed at 2024 0550  Gross per 24 hour   Intake 120 ml   Output 650 ml   Net -530 ml   Right pleural pigtail catheter: 450 mL / 24 hours, serosanguineous    Lab:     CBC:  Results from last 7 days   Lab Units 24  0440 24  0501 24  1325   WBC 10*3/mm3 8.21 10.87* 12.29*   HEMATOCRIT % 26.3* 25.5* 29.2*   PLATELETS 10*3/mm3 314 316 378          BMP:  Results from last 7 days   Lab Units 24  0440 24  0501 24  0432   SODIUM mmol/L 135* 134* 133*   POTASSIUM mmol/L 4.2 4.1 4.2   CHLORIDE mmol/L 109* 106 102   CO2 mmol/L 15.0* 17.0* 19.0*   GLUCOSE mg/dL 89 93 98   BUN mg/dL 8 8 10   CREATININE mg/dL 0.63* 0.56* 0.67*          COAG:  Results from last 7 days   Lab Units 24  0414   INR  0.98       IMAGES:       Imaging Results (Last 24 Hours)       Procedure Component Value Units Date/Time    XR Chest 1 View [[id number]] Collected: 24 0632     Updated: 24 0639    Narrative:      EXAMINATION: XR CHEST 1 VW-     2024 2:30 AM     HISTORY: right pleural effusion; Z74.09-Other reduced mobility     A frontal projection of the " chest is compared with the previous study  dated 8/21/2024.     The lungs are moderately well-expanded, significantly improved since the  previous study     A small caliber catheter/pigtail catheter is seen in the right lower  chest.     There is small loculated fluid along the right lateral chest wall wall  containing minimal air similar to the previous study. The right basal  free fluid has completely resolved in the interval.     Ill-defined opacity in the right lower chest may represent atelectatic  changes or posteriorly loculated fluid.     No visible pneumothorax.     The heart size is in the normal range. Atheromatous tortuous thoracic  aorta is seen.     No acute bony abnormality. Hardware fusion of the lower cervical spine  is noted.       Impression:      1. Resolution of the free pleural fluid at the right base since the  previous study. Small loculated fluid along the right lateral chest wall  persist.                    This report was signed and finalized on 8/22/2024 6:36 AM by Dr. Veronika Gutierrez MD.       XR Abdomen KUB [[id number]] Collected: 08/21/24 1501     Updated: 08/21/24 1507    Narrative:      EXAMINATION: XR ABDOMEN KUB- 8/21/2024 3:01 PM     HISTORY: Abdominal distention.     REPORT: Supine imaging of the abdomen was performed.     COMPARISON: There are no correlative imaging studies for comparison.     There is mild distention of the stomach with gas, the bowel gas pattern  is visualized is unremarkable. There is a pigtail chest tube at the  right lung base that appears to be in satisfactory position. Increased  density at the right lung base may represent the known empyema. Surgical  clips are noted in the left mid abdomen. Diffuse degenerative endplate  spurring is seen throughout the lumbar spine.       Impression:      Nonobstructive bowel gas pattern, mild distention of the  stomach with gas, nonspecific. Pigtail chest tube at the right lung base  appears to be in satisfactory  position.     This report was signed and finalized on 8/21/2024 3:04 PM by Dr. James Jean MD.               Physical Exam:  General: Alert, oriented. No apparent distress.  Chronically ill-appearing  Cardiovascular: Regular rate and rhythm without murmur, rubs, or gallops.    Pulmonary: Clear to auscultation bilaterally without wheezing, rubs, or rales.  Chest: Right pleural pigtail catheter in place to -20 cm suction, no airleak  Abdomen: Soft, nondistended, and nontender.  Extremities: Warm, moves all extremities. No edema.   Neurologic:  Grossly intact with no focal deficits.          Impression:  Right pleural effusion  Generalized weakness  Weight loss    Plan:  Continue intrapleural tPA  Repeat CT chest tomorrow once intrapleural tPA is completed.  Continue chest tube suction at -20 cm  Flush chest tube with 10 mL of normal saline every 8 hours  Daily chest x-ray  Continue protein supplements with each meal  Antibiotic management per infectious disease, completed 5-day course of Zosyn yesterday.   Discussed with patient      KRIS Sandra  08/22/24  07:08 CDT

## 2024-08-22 NOTE — PROGRESS NOTES
"    [facility] Services  INPATIENT PROGRESS NOTE    Patient Name: Ravin Garza  Date of Admission: 8/15/2024  Today's Date: 08/22/24  Length of Stay: 7  Primary Care Physician: Rk Nelson MD    Subjective   Chief Complaint: Weakness  HPI   64-year-old male with history of traumatic brain injury, morbid renal accidents, renal cell carcinoma, alcohol abuse, left above-the-knee amputation, admitted to the hospital on 8/15/2024, complaining of generalized weakness, weight loss, poor appetite.  History admitted with anemia, and imaging studies showing possible right-sided empyema.  He was transferred to [facility] for management.    Patient had a pigtail catheter placed on 8/16/2024.  Apparently not draining appropriately and had a repeat procedure pigtail catheter placement August 19, 2024.    I started taking care of this patient on 8/20/2024.    Patient does not provide much information of changes in his symptomatology.  Wife at bedside.  She reports that he has been coughing less.  He reports no shortness of breath.  Abdominal \"tightness\" still present.  No fevers.  Chest tube in place        Review of Systems   All pertinent negatives and positives are as above. All other systems have been reviewed and are negative unless otherwise stated.     Objective    Temp:  [97.4 °F (36.3 °C)-97.9 °F (36.6 °C)] 97.4 °F (36.3 °C)  Heart Rate:  [] 101  Resp:  [18-20] 20  BP: (103-121)/(82-89) 115/89  Physical Exam  Constitutional:       Appearance: Chronically ill-appearing..  Alert oriented  HENT:      Head: Normocephalic and atraumatic.      Nose: Nose normal.      Mouth/Throat:      Mouth: Mucous membranes are moist.      Pharynx: Oropharynx is clear.   Eyes:      Extraocular Movements: Extraocular movements intact.      Conjunctiva/sclera: Conjunctivae normal.      Pupils: Pupils are equal, round, and reactive to light.   Cardiovascular:      Rate and Rhythm: Normal rate " and regular rhythm.      Pulses: Normal pulses.   Pulmonary: Pigtail in place on the right posterior thorax.     Effort: No respiratory distress.      Breath sounds: Decreased breath sounds.  Abdominal:      General: Abdomen is flat. Bowel sounds are normal.      Palpations: Abdomen is soft.      Tenderness: There is no guarding or rebound.   Musculoskeletal:         General: Normal range of motion.      Cervical back: Normal range of motion and neck supple.   Extremities: Left above-the-knee amputation status.  Right lower extremity with no edema  Skin:     Capillary Refill: Capillary refill takes less than 2 seconds.      Coloration: Skin is not jaundiced.      Findings: No rash.   Neurological:      General: No focal deficit present.      Mental Status: Patient is alert, oriented to place time and person.     Sensory: No sensory deficit.      Motor: No weakness.      Coordination: Coordination normal.   Psychiatric:         Mood and Affect: Mood normal.         Behavior: Behavior normal.           Results Review:  I have reviewed the labs, radiology results, and diagnostic studies.    Laboratory Data:   Results from last 7 days   Lab Units 08/22/24  0921 08/21/24  0440 08/20/24  0501 08/19/24  1325   WBC 10*3/mm3  --  8.21 10.87* 12.29*   HEMOGLOBIN g/dL 9.4* 7.4* 7.3* 8.0*   HEMATOCRIT % 34.6* 26.3* 25.5* 29.2*   PLATELETS 10*3/mm3  --  314 316 378        Results from last 7 days   Lab Units 08/21/24  0440 08/20/24  0501 08/19/24  0432 08/18/24  0413 08/17/24  0409 08/16/24  1308 08/16/24  0413   SODIUM mmol/L 135* 134* 133*   < > 136  --  137   POTASSIUM mmol/L 4.2 4.1 4.2   < > 3.5   < > 3.1*   CHLORIDE mmol/L 109* 106 102   < > 100  --  98   CO2 mmol/L 15.0* 17.0* 19.0*   < > 24.0  --  28.0   BUN mg/dL 8 8 10   < > 11  --  14   CREATININE mg/dL 0.63* 0.56* 0.67*   < > 0.70*  --  0.72*   CALCIUM mg/dL 7.3* 7.8* 8.4*   < > 9.5  --  10.4   BILIRUBIN mg/dL  --  0.7  --   --  0.7  --  0.6   ALK PHOS U/L  --  146*   --   --  158*  --  152*   ALT (SGPT) U/L  --  17  --   --  21  --  19   AST (SGOT) U/L  --  30  --   --  43*  --  42*   GLUCOSE mg/dL 89 93 98   < > 82  --  102*    < > = values in this interval not displayed.       Culture Data:   Blood Culture   Date Value Ref Range Status   08/16/2024 No growth at 3 days  Preliminary   08/16/2024 No growth at 3 days  Preliminary       Radiology Data:   Imaging Results (Last 24 Hours)       Procedure Component Value Units Date/Time    XR Chest 1 View [[id number]] Collected: 08/22/24 0632     Updated: 08/22/24 0639    Narrative:      EXAMINATION: XR CHEST 1 VW-     8/22/2024 2:30 AM     HISTORY: right pleural effusion; Z74.09-Other reduced mobility     A frontal projection of the chest is compared with the previous study  dated 8/21/2024.     The lungs are moderately well-expanded, significantly improved since the  previous study     A small caliber catheter/pigtail catheter is seen in the right lower  chest.     There is small loculated fluid along the right lateral chest wall wall  containing minimal air similar to the previous study. The right basal  free fluid has completely resolved in the interval.     Ill-defined opacity in the right lower chest may represent atelectatic  changes or posteriorly loculated fluid.     No visible pneumothorax.     The heart size is in the normal range. Atheromatous tortuous thoracic  aorta is seen.     No acute bony abnormality. Hardware fusion of the lower cervical spine  is noted.       Impression:      1. Resolution of the free pleural fluid at the right base since the  previous study. Small loculated fluid along the right lateral chest wall  persist.                    This report was signed and finalized on 8/22/2024 6:36 AM by Dr. Veronika Gutierrez MD.       XR Abdomen KUB [[id number]] Collected: 08/21/24 1501     Updated: 08/21/24 1507    Narrative:      EXAMINATION: XR ABDOMEN KUB- 8/21/2024 3:01 PM     HISTORY: Abdominal distention.      REPORT: Supine imaging of the abdomen was performed.     COMPARISON: There are no correlative imaging studies for comparison.     There is mild distention of the stomach with gas, the bowel gas pattern  is visualized is unremarkable. There is a pigtail chest tube at the  right lung base that appears to be in satisfactory position. Increased  density at the right lung base may represent the known empyema. Surgical  clips are noted in the left mid abdomen. Diffuse degenerative endplate  spurring is seen throughout the lumbar spine.       Impression:      Nonobstructive bowel gas pattern, mild distention of the  stomach with gas, nonspecific. Pigtail chest tube at the right lung base  appears to be in satisfactory position.     This report was signed and finalized on 8/21/2024 3:04 PM by Dr. James Jean MD.               I have reviewed the patient's current medications.     Assessment/Plan   Assessment  Active Hospital Problems    Diagnosis     **Empyema lung     Microcytic anemia     Hypomagnesemia     Weight loss     History of traumatic brain injury     Abnormal finding on imaging - outpatient ct abd w/ ? empyema     Hypercalcemia     Severe malnutrition     Generalized weakness      Right pleural effusion/empyema  Hypophosphatemia  Hypomagnesemia  History of left above-the-knee amputation  Severe malnutrition  Alcohol abuse disorder  Chronic microcytic anemia, iron deficiency      Labs 8/21/2024 potassium 4.2, CO2 15.  BUN 8, creatinine 0.63.  Magnesium 1.7, phosphorus 2.7.  White blood cell count down to 8210.    Today, hemoglobin improved 9.4.    Chest tube drainage decreasing, reported 450 mL    Pleural fluid culture from 8/16/2024, growth of Streptococcus mitis  Cytology body fluid pending    Treatment Plan    Patient has been managed with Zosyn, started on 8/16/2024, received 5 days of treatment. Continue antibiotic treatment per infectious diseases recommendations:  Ceftriaxone 2 g IV daily, which  will be started today 8/22/2024    Follow hemoglobin hematocrit, transfuse PRBC if below 7.    Follow electrolytes tomorrow.    Daily chest x-rays Chest tube to suction.  Patient received tPA via pigtail catheter. CT scan of the chest next Friday as per cardiothoracic surgery recommendations.      Given persistent abdominal complaints, will do a CT scan of the abdomen and pelvis tomorrow as well.      Medical Decision Making  Number and Complexity of problems: 7, moderate to high complexity  Differential Diagnosis: See above    Conditions and Status        Condition is unchanged.     [facility] Data  External documents reviewed: None  Cardiac tracing (EKG, telemetry) interpretation: No new  Radiology interpretation: Radiology reports reviewed  Labs reviewed: Yes  Any tests that were considered but not ordered: No     Decision rules/scores evaluated (example [id number], [place], etc): See chart     Discussed with: Patient     Care Planning  Shared decision making: With patient  Code status and discussions: No CPR    Disposition  Social Determinants of Health that impact treatment or disposition: Alcohol use  I expect the patient to be discharged once pigtail removed.     Electronically signed by Hernan Benedict MD, 08/22/24, 11:14 CDT.

## 2024-08-22 NOTE — THERAPY TREATMENT NOTE
Acute Care - Physical Therapy Treatment Note  [facility]     Patient Name: Ravin Garza  : 1960  MRN: [id number]  Today's Date: 2024      Visit Dx:     ICD-10-CM ICD-9-CM   1. Impaired mobility [Z74.09]  Z74.09 799.89     Patient Active Problem List   Diagnosis    Generalized weakness    Microcytic anemia    Hypomagnesemia    Weight loss    History of traumatic brain injury    Abnormal finding on imaging - outpatient ct abd w/ ? empyema    Hypercalcemia    Severe malnutrition    Empyema lung     Past Medical History:   Diagnosis Date    Amputee, above knee     bilateral    ETOH abuse      History reviewed. No pertinent surgical history.  PT Assessment (Last 12 Hours)       PT Evaluation and Treatment       Row Name 24 1325          Physical Therapy Time and Intention    Subjective Information complains of;pain  -     Document Type therapy note (daily note)  -     Mode of Treatment physical therapy  -       Row Name 24 1325          General Information    Existing Precautions/Restrictions fall  chest tube, prior L BKA  -       Row Name 24 1325          Pain    Pretreatment Pain Rating 5/10  -PHYLLIS     Posttreatment Pain Rating 6/10  -     Pain Location - Side/Orientation Right  -     Pain Location incisional  -     Pain Location - chest  -     Pain Intervention(s) Ambulation/increased activity  -       Row Name 24 1325          Bed Mobility    Bed Mobility sit-supine  -     Supine-Sit [place] (Bed Mobility) --  sitting on BSC  -     Sit-Supine [place] (Bed Mobility) standby assist  -       Row Name 24 1325          Transfers    Transfers sit-stand transfer;stand-sit transfer  -HPYLLIS     Comment, (Transfers) stood multiple times from BSC then bed  -       Row Name 24 1325          Sit-Stand Transfer    Sit-Stand [place] (Transfers) verbal cues;contact guard  -       Row Name 24 1325          Stand-Sit Transfer    Stand-Sit  [place] (Transfers) verbal cues;contact guard  -PHYLLIS       Row Name 08/22/24 1325          Gait/Stairs (Locomotion)    [place] Level (Gait) verbal cues;minimum assist (75% patient effort)  -PHYLLIS     Assistive Device (Gait) --  pt tended to hold onto objects in the room  Brought RWX for next tx.  -PHYLLIS     Distance in Feet (Gait) 30  -PHYLLIS       Row Name 08/22/24 1325          Positioning and Restraints    Pre-Treatment Position bedside commode  -PHYLLIS     Post Treatment Position bed  -PHYLLIS     In Bed supine;call light within reach;encouraged to call for assist;side rails up x2  -PHYLLIS               User Key  (r) = Recorded By, (t) = Taken By, (c) = Cosigned By      Initials Name Provider Type    PHYLLIS Galo Lawrence, PTA Physical Therapist Assistant                    Physical Therapy Education       Title: PT OT SLP Therapies (Done)       Topic: Physical Therapy (Done)       Point: Mobility training (Done)       Learning Progress Summary             Patient Acceptance, E, VU by  at 8/21/2024 1543    Comment: role of PT, d/c plans if pt wants to return home, assist for OOB activity                         Point: Home exercise program (Done)       Learning Progress Summary             Patient Acceptance, E, VU by  at 8/21/2024 1543    Comment: role of PT, d/c plans if pt wants to return home, assist for OOB activity                         Point: Body mechanics (Done)       Learning Progress Summary             Patient Acceptance, E, VU by  at 8/21/2024 1543    Comment: role of PT, d/c plans if pt wants to return home, assist for OOB activity                         Point: Precautions (Done)       Learning Progress Summary             Patient Acceptance, E, VU by  at 8/21/2024 1543    Comment: role of PT, d/c plans if pt wants to return home, assist for OOB activity                                         User Key       Initials Effective Dates Name Provider Type Discipline     08/15/24 -  Presley Armenta, PT DPT  Physical Therapist PT                  PT Recommendation and Plan     Plan of Care Reviewed With: patient  Progress: improving  Outcome Evaluation: Pt was sitting EOB.  Able to transfer sit to stand with CGA.  Amb 30' with min assist, pt tending to hold onto objects in the room,  brought pt a RWX to increase balance and safety with amb.  Will continue to work with pt to increase strength and progress pt to being independent.   Outcome Measures       Row Name 08/22/24 1325             How much help from another person do you currently need...    Turning from your back to your side while in flat bed without using bedrails? 3  -PHYLLIS      Moving from lying on back to sitting on the side of a flat bed without bedrails? 3  -PHYLLIS      Moving to and from a bed to a chair (including a wheelchair)? 3  -PHYLLIS      Standing up from a chair using your arms (e.g., wheelchair, bedside chair)? 3  -PHYLLIS      Climbing 3-5 steps with a railing? 2  -PHYLLIS      To walk in hospital room? 3  -PHYLLIS      AM-PAC 6 Clicks Score (PT) 17  -PHYLLIS      Highest Level of Mobility Goal 5 --> Static standing  -PHYLLIS         Functional Assessment    Outcome Measure Options AM-PAC 6 Clicks Basic Mobility (PT)  -PHYLLIS                User Key  (r) = Recorded By, (t) = Taken By, (c) = Cosigned By      Initials Name Provider Type    Galo Hutchinson, PTA Physical Therapist Assistant                     Time Calculation:    PT Charges       Row Name 08/22/24 1325             Time Calculation    Start Time 1325  -PHYLLIS      Stop Time 1352  -PHYLLIS      Time Calculation (min) 27 min  -PHYLLIS      PT Received On 08/22/24  -PHYLLIS         Time Calculation- PT    Total Timed Code Minutes- PT 27 minute(s)  -PHYLLIS         Timed Charges    65688 - Gait Training Minutes  15  -PHYLLIS      28723 - PT Therapeutic Activity Minutes 12  -PHYLLIS         Total Minutes    Timed Charges Total Minutes 27  -PHYLLIS       Total Minutes 27  -PHYLLIS                User Key  (r) = Recorded By, (t) = Taken By, (c) = Cosigned By       Initials Name Provider Type    PHYLLIS Galo Lawrence PTA Physical Therapist Assistant                  Therapy Charges for Today       Code Description Service Date Service Provider Modifiers Qty    48072455265 HC GAIT TRAINING EA 15 MIN 8/22/2024 Galo Lawrence, DOMINGO GP 1    47313527415 HC PT THERAPEUTIC ACT EA 15 MIN 8/22/2024 Galo Lawrence, DOMINGO GP 1            PT G-Codes  Outcome Measure Options: AM-PAC 6 Clicks Basic Mobility (PT)  AM-PAC 6 Clicks Score (PT): 17    Galo Lawrence PTA  8/22/2024

## 2024-08-22 NOTE — PLAN OF CARE
Goal Outcome Evaluation:  Plan of Care Reviewed With: patient        Progress: improving  Outcome Evaluation: Pt was sitting EOB.  Able to transfer sit to stand with CGA.  Amb 30' with min assist, pt tending to hold onto objects in the room,  brought pt a RWX to increase balance and safety with amb.  Will continue to work with pt to increase strength and progress pt to being independent.

## 2024-08-22 NOTE — PLAN OF CARE
Problem: Adult Inpatient Plan of Care  Goal: Plan of Care Review  Outcome: Ongoing, Progressing  Goal: Patient-Specific Goal (Individualized)  Outcome: Ongoing, Progressing  Goal: Absence of Hospital-Acquired Illness or Injury  Outcome: Ongoing, Progressing  Intervention: Identify and Manage Fall Risk  Recent Flowsheet Documentation  Taken 8/22/2024 0400 by Kd Ortez RN  Safety Promotion/Fall Prevention: safety round/check completed  Taken 8/22/2024 0200 by Kd Ortez RN  Safety Promotion/Fall Prevention: safety round/check completed  Taken 8/22/2024 0000 by Kd Ortez RN  Safety Promotion/Fall Prevention: safety round/check completed  Taken 8/21/2024 2200 by Kd Ortez RN  Safety Promotion/Fall Prevention: safety round/check completed  Taken 8/21/2024 2100 by Kd Ortez RN  Safety Promotion/Fall Prevention: safety round/check completed  Taken 8/21/2024 2000 by Kd Ortez RN  Safety Promotion/Fall Prevention: safety round/check completed  Taken 8/21/2024 1900 by Kd Ortez RN  Safety Promotion/Fall Prevention: safety round/check completed  Goal: Optimal Comfort and Wellbeing  Outcome: Ongoing, Progressing  Intervention: Provide Person-Centered Care  Recent Flowsheet Documentation  Taken 8/21/2024 2030 by dK Ortez RN  Trust Relationship/Rapport:   care explained   choices provided   emotional support provided   empathic listening provided   questions encouraged   questions answered  Goal: Readiness for Transition of Care  Outcome: Ongoing, Progressing     Problem: Anemia  Goal: Anemia Symptom Improvement  Outcome: Ongoing, Progressing  Intervention: Monitor and Manage Anemia  Recent Flowsheet Documentation  Taken 8/22/2024 0400 by Kd Ortez RN  Safety Promotion/Fall Prevention: safety round/check completed  Taken 8/22/2024 0200 by Kd Ortez RN  Safety Promotion/Fall Prevention: safety round/check completed  Taken 8/22/2024 0000 by Kd Ortez RN  Safety Promotion/Fall Prevention: safety round/check  completed  Taken 8/21/2024 2200 by Kd Ortez RN  Safety Promotion/Fall Prevention: safety round/check completed  Taken 8/21/2024 2100 by Kd Ortez RN  Safety Promotion/Fall Prevention: safety round/check completed  Taken 8/21/2024 2000 by Kd Ortez RN  Safety Promotion/Fall Prevention: safety round/check completed  Taken 8/21/2024 1900 by Kd Ortze RN  Safety Promotion/Fall Prevention: safety round/check completed     Problem: Malnutrition  Goal: Improved Nutritional Intake  Outcome: Ongoing, Progressing     Problem: Fall Injury Risk  Goal: Absence of Fall and Fall-Related Injury  Outcome: Ongoing, Progressing  Intervention: Promote Injury-Free Environment  Recent Flowsheet Documentation  Taken 8/22/2024 0400 by Kd Ortez RN  Safety Promotion/Fall Prevention: safety round/check completed  Taken 8/22/2024 0200 by Kd Ortez RN  Safety Promotion/Fall Prevention: safety round/check completed  Taken 8/22/2024 0000 by Kd Ortez RN  Safety Promotion/Fall Prevention: safety round/check completed  Taken 8/21/2024 2200 by Kd Ortez RN  Safety Promotion/Fall Prevention: safety round/check completed  Taken 8/21/2024 2100 by Kd Ortez RN  Safety Promotion/Fall Prevention: safety round/check completed  Taken 8/21/2024 2000 by Kd Ortez RN  Safety Promotion/Fall Prevention: safety round/check completed  Taken 8/21/2024 1900 by Kd Ortez RN  Safety Promotion/Fall Prevention: safety round/check completed     Problem: Skin Injury Risk Increased  Goal: Skin Health and Integrity  Outcome: Ongoing, Progressing   Goal Outcome Evaluation:         Patient is a/o x 4. Patient only had one complaint of pain overnight. Patient has slept part of the night. Patient's dressing is C/D/I. Patient has not had any adverse events occur.

## 2024-08-22 NOTE — PROGRESS NOTES
"Infectious Diseases Progress Note    Patient:  Ravin Garza  YOB: 1960  MRN: [id number]   Admit date: 8/15/2024   Admitting Physician: Hernan Benedict MD  Primary Care Physician: Rk Nelson MD    Chief Complaint/Interval History: He indicates he is going home tomorrow.  He implied he is going stir crazy.  Indicated he needed to make sure everything was in place regarding any chest tube management, antibiotic treatment, or other treatments.  He indicated, \"no I am going home tomorrow.\".  He receives primary general medical care through [facility] in [place].  He lives in the [place] area.  He describes some generalized discomfort.  He is not having cough, sputum production, or dyspnea.  Follow-up CT scan is anticipated tomorrow to assess adequately of pleural fluid drainage.  Explained the results of the CT would provide some assistance in determining when to transition to oral antibiotic treatment and remove his chest tube.    Note was initiated shortly before I saw him on August 22, 2024.  Note completed as a late entry August 23, 2024.    Intake/Output Summary (Last 24 hours) at 8/22/2024 1500  Last data filed at 8/22/2024 0550  Gross per 24 hour   Intake 120 ml   Output 650 ml   Net -530 ml     Allergies: No Known Allergies  Current Scheduled Medications:   alteplase (ACTIVASE) 10 mg in sodium chloride 0.9 % 30 mL Syringe, 10 mg, Intrapleural, BID   And  dornase alpha (PULMOZYME) 5 mg in sterile water (preservative free) 30 mL intrapleural syringe, 5 mg, Intrapleural, BID  cefTRIAXone, 2,000 mg, Intravenous, Q24H  famotidine, 20 mg, Oral, BID AC  ferrous sulfate, 325 mg, Oral, Daily With Breakfast  nicotine, 1 patch, Transdermal, Q24H  potassium chloride, 20 mEq, Oral, BID With Meals  sodium chloride, 10 mL, Intravenous, Q12H  sodium chloride, 10 mL, Intracatheter, Q8H        Current PRN Medications:    acetaminophen **OR** acetaminophen **OR** acetaminophen    senna-docusate sodium " "**AND** polyethylene glycol **AND** bisacodyl **AND** bisacodyl    calcium carbonate    Calcium Replacement - Follow Nurse / BPA Driven Protocol    Magnesium Standard Dose Replacement - Follow Nurse / BPA Driven Protocol    melatonin    ondansetron    Phosphorus Replacement - Follow Nurse / BPA Driven Protocol    Potassium Replacement - Follow Nurse / BPA Driven Protocol    simethicone    sodium chloride    sodium chloride    Review of Systems see HPI    Vital Signs:  Temp (24hrs), Av.6 °F (36.4 °C), Min:97.4 °F (36.3 °C), Max:97.9 °F (36.6 °C)    /86 (BP Location: Right arm, Patient Position: Lying)   Pulse 92   Temp 97.6 °F (36.4 °C) (Oral)   Resp 18   Ht 188 cm (74\")   Wt 77.9 kg (171 lb 12.8 oz)   SpO2 99%   BMI 22.06 kg/m²     Physical Exam  Vital signs - reviewed.  Line/IV site - No erythema, warmth, induration, or tenderness.  He did not appear to be in any distress  Abdomen mild distention.  Soft.  No tenderness.  Skin without rash.    Lab Results:  CBC:   Results from last 7 days   Lab Units 24  0921 24  0440 24  0501 24  1325 24  0413 24  0408 24  1308 24  0413   WBC 10*3/mm3  --  8.21 10.87* 12.29* 12.12* 8.08  --  7.76   HEMOGLOBIN g/dL 9.4* 7.4* 7.3* 8.0* 8.1* 7.8* 7.2* 6.6*   HEMATOCRIT % 34.6* 26.3* 25.5* 29.2* 29.5* 28.5* 25.6* 25.1*   PLATELETS 10*3/mm3  --  314 316 378 281 190  --  213     BMP:  Results from last 7 days   Lab Units 24  0440 24  0501 24  0432 24  2120 24  0413 24  0409 24  1308 24  0413   SODIUM mmol/L 135* 134* 133*  --  135* 136  --  137   POTASSIUM mmol/L 4.2 4.1 4.2 4.2 3.3* 3.5 3.4* 3.1*   CHLORIDE mmol/L 109* 106 102  --  101 100  --  98   CO2 mmol/L 15.0* 17.0* 19.0*  --  22.0 24.0  --  28.0   BUN mg/dL 8 8 10  --  11 11  --  14   CREATININE mg/dL 0.63* 0.56* 0.67*  --  0.71* 0.70*  --  0.72*   GLUCOSE mg/dL 89 93 98  --  95 82  --  102*   CALCIUM mg/dL 7.3* 7.8* " 8.4*  --  8.8 9.5  --  10.4   ALT (SGPT) U/L  --  17  --   --   --  21  --  19     Culture Results:   Blood Culture   Date Value Ref Range Status   08/16/2024 No growth at 5 days  Final   08/16/2024 No growth at 5 days  Final     Body fluid cultures-right pleural cavity August 16, 2024:  Body Fluid Culture   Lab   Streptococcus mitis / oralis Abnormal   [facility]                       Gram Stain  Lab   Many (4+) WBCs seen  [facility]      No organisms seen  [facility]              Susceptibility       Streptococcus mitis / oralis     JESS     Ceftriaxone <=0.12 ug/ml Susceptible     Penicillin G <=0.06 ug/ml Susceptible     Vancomycin 0.5 ug/ml Susceptible              Radiology: None    Additional Studies Reviewed:  Pleural fluid cytology August 16, 2024:  Final Diagnosis  Pleural fluid, side not stated, ThinPrep preparation (1) and cell block:  A. Marked acute inflammation with cellular degeneration.  B. Negative for malignant cells.  Electronically signed by Natalie Chavez MD on 8/23/2024 at 1336      Impression:   1.  Loculated right pleural effusion/empyema  2.  History of renal cell cancer  3.  Tobacco use  4.  Weight loss  5.  Anemia  6.  Cirrhosis    Recommendations:   Continue ceftriaxone  Encouraging him to remain in hospital until complete discharge plan can be put in place  CT chest and abdomen has already been scheduled for tomorrow  Continue supportive care  Continue to follow    Mickey Zamora MD

## 2024-08-23 ENCOUNTER — APPOINTMENT (OUTPATIENT)
Dept: GENERAL RADIOLOGY | Facility: HOSPITAL | Age: 64
End: 2024-08-23
Payer: MEDICARE

## 2024-08-23 ENCOUNTER — APPOINTMENT (OUTPATIENT)
Dept: CT IMAGING | Facility: HOSPITAL | Age: 64
End: 2024-08-23
Payer: MEDICARE

## 2024-08-23 LAB
ALBUMIN SERPL-MCNC: 2.7 G/DL (ref 3.5–5.2)
ALBUMIN/GLOB SERPL: 0.8 G/DL
ALP SERPL-CCNC: 146 U/L (ref 39–117)
ALT SERPL W P-5'-P-CCNC: 14 U/L (ref 1–41)
ANION GAP SERPL CALCULATED.3IONS-SCNC: 8 MMOL/L (ref 5–15)
AST SERPL-CCNC: 26 U/L (ref 1–40)
BILIRUB SERPL-MCNC: 0.3 MG/DL (ref 0–1.2)
BUN SERPL-MCNC: 8 MG/DL (ref 8–23)
BUN/CREAT SERPL: 13.8 (ref 7–25)
CALCIUM SPEC-SCNC: 7.8 MG/DL (ref 8.6–10.5)
CHLORIDE SERPL-SCNC: 108 MMOL/L (ref 98–107)
CO2 SERPL-SCNC: 19 MMOL/L (ref 22–29)
CREAT SERPL-MCNC: 0.58 MG/DL (ref 0.76–1.27)
CYTO UR: NORMAL
DEPRECATED RDW RBC AUTO: 80.6 FL (ref 37–54)
EGFRCR SERPLBLD CKD-EPI 2021: 108.2 ML/MIN/1.73
ERYTHROCYTE [DISTWIDTH] IN BLOOD BY AUTOMATED COUNT: 28.7 % (ref 12.3–15.4)
GLOBULIN UR ELPH-MCNC: 3.4 GM/DL
GLUCOSE SERPL-MCNC: 89 MG/DL (ref 65–99)
HCT VFR BLD AUTO: 28.2 % (ref 37.5–51)
HGB BLD-MCNC: 7.8 G/DL (ref 13–17.7)
LAB AP CASE REPORT: NORMAL
Lab: NORMAL
MAGNESIUM SERPL-MCNC: 1.8 MG/DL (ref 1.6–2.4)
MCH RBC QN AUTO: 22 PG (ref 26.6–33)
MCHC RBC AUTO-ENTMCNC: 27.7 G/DL (ref 31.5–35.7)
MCV RBC AUTO: 79.4 FL (ref 79–97)
PATH REPORT.FINAL DX SPEC: NORMAL
PATH REPORT.GROSS SPEC: NORMAL
PHOSPHATE SERPL-MCNC: 2 MG/DL (ref 2.5–4.5)
PHOSPHATE SERPL-MCNC: 2.9 MG/DL (ref 2.5–4.5)
PLATELET # BLD AUTO: 572 10*3/MM3 (ref 140–450)
PMV BLD AUTO: 8.6 FL (ref 6–12)
POTASSIUM SERPL-SCNC: 4.4 MMOL/L (ref 3.5–5.2)
PREALB SERPL-MCNC: 8.5 MG/DL (ref 20–40)
PROT SERPL-MCNC: 6.1 G/DL (ref 6–8.5)
RBC # BLD AUTO: 3.55 10*6/MM3 (ref 4.14–5.8)
SODIUM SERPL-SCNC: 135 MMOL/L (ref 136–145)
WBC NRBC COR # BLD AUTO: 10.34 10*3/MM3 (ref 3.4–10.8)

## 2024-08-23 PROCEDURE — 80053 COMPREHEN METABOLIC PANEL: CPT | Performed by: FAMILY MEDICINE

## 2024-08-23 PROCEDURE — 71045 X-RAY EXAM CHEST 1 VIEW: CPT

## 2024-08-23 PROCEDURE — 25010000002 CEFTRIAXONE PER 250 MG: Performed by: FAMILY MEDICINE

## 2024-08-23 PROCEDURE — 25510000001 IOPAMIDOL 61 % SOLUTION: Performed by: INTERNAL MEDICINE

## 2024-08-23 PROCEDURE — 25010000002 ALTEPLASE 2 MG RECONSTITUTED SOLUTION 1 EACH VIAL: Performed by: NURSE PRACTITIONER

## 2024-08-23 PROCEDURE — 84100 ASSAY OF PHOSPHORUS: CPT | Performed by: FAMILY MEDICINE

## 2024-08-23 PROCEDURE — 85027 COMPLETE CBC AUTOMATED: CPT | Performed by: FAMILY MEDICINE

## 2024-08-23 PROCEDURE — 84100 ASSAY OF PHOSPHORUS: CPT | Performed by: INTERNAL MEDICINE

## 2024-08-23 PROCEDURE — 99232 SBSQ HOSP IP/OBS MODERATE 35: CPT | Performed by: INTERNAL MEDICINE

## 2024-08-23 PROCEDURE — 25810000003 SODIUM CHLORIDE 0.9 % SOLUTION 250 ML FLEX CONT: Performed by: INTERNAL MEDICINE

## 2024-08-23 PROCEDURE — 71260 CT THORAX DX C+: CPT

## 2024-08-23 PROCEDURE — 84134 ASSAY OF PREALBUMIN: CPT | Performed by: NURSE PRACTITIONER

## 2024-08-23 PROCEDURE — 99232 SBSQ HOSP IP/OBS MODERATE 35: CPT | Performed by: SURGERY

## 2024-08-23 PROCEDURE — 83735 ASSAY OF MAGNESIUM: CPT | Performed by: FAMILY MEDICINE

## 2024-08-23 PROCEDURE — 74177 CT ABD & PELVIS W/CONTRAST: CPT

## 2024-08-23 RX ORDER — IOPAMIDOL 612 MG/ML
100 INJECTION, SOLUTION INTRAVASCULAR
Status: COMPLETED | OUTPATIENT
Start: 2024-08-23 | End: 2024-08-23

## 2024-08-23 RX ADMIN — DORNASE ALFA 5 MG: 1 SOLUTION RESPIRATORY (INHALATION) at 13:14

## 2024-08-23 RX ADMIN — SODIUM PHOSPHATE, MONOBASIC, MONOHYDRATE AND SODIUM PHOSPHATE, DIBASIC, ANHYDROUS 15 MMOL: 142; 276 INJECTION, SOLUTION INTRAVENOUS at 13:25

## 2024-08-23 RX ADMIN — FAMOTIDINE 20 MG: 20 TABLET, FILM COATED ORAL at 07:49

## 2024-08-23 RX ADMIN — WATER 5 MG: 1 INJECTION INTRAMUSCULAR; INTRAVENOUS; SUBCUTANEOUS at 20:57

## 2024-08-23 RX ADMIN — IOPAMIDOL 100 ML: 612 INJECTION, SOLUTION INTRAVENOUS at 11:36

## 2024-08-23 RX ADMIN — ALTEPLASE 10 MG: 2.2 INJECTION, POWDER, LYOPHILIZED, FOR SOLUTION INTRAVENOUS at 20:57

## 2024-08-23 RX ADMIN — Medication 10 ML: at 21:03

## 2024-08-23 RX ADMIN — Medication 10 ML: at 08:16

## 2024-08-23 RX ADMIN — SIMETHICONE 80 MG: 80 TABLET, CHEWABLE ORAL at 13:22

## 2024-08-23 RX ADMIN — FAMOTIDINE 20 MG: 20 TABLET, FILM COATED ORAL at 17:26

## 2024-08-23 RX ADMIN — FERROUS SULFATE TAB 325 MG (65 MG ELEMENTAL FE) 325 MG: 325 (65 FE) TAB at 07:49

## 2024-08-23 RX ADMIN — POTASSIUM CHLORIDE 20 MEQ: 750 CAPSULE, EXTENDED RELEASE ORAL at 07:49

## 2024-08-23 RX ADMIN — SIMETHICONE 80 MG: 80 TABLET, CHEWABLE ORAL at 23:38

## 2024-08-23 RX ADMIN — POTASSIUM CHLORIDE 20 MEQ: 750 CAPSULE, EXTENDED RELEASE ORAL at 17:26

## 2024-08-23 RX ADMIN — Medication 10 ML: at 07:49

## 2024-08-23 RX ADMIN — Medication 10 ML: at 17:26

## 2024-08-23 RX ADMIN — ACETAMINOPHEN 650 MG: 325 TABLET ORAL at 15:35

## 2024-08-23 RX ADMIN — ACETAMINOPHEN 650 MG: 325 TABLET ORAL at 19:52

## 2024-08-23 RX ADMIN — ALTEPLASE 10 MG: 2.2 INJECTION, POWDER, LYOPHILIZED, FOR SOLUTION INTRAVENOUS at 13:14

## 2024-08-23 RX ADMIN — SIMETHICONE 80 MG: 80 TABLET, CHEWABLE ORAL at 17:28

## 2024-08-23 RX ADMIN — CEFTRIAXONE SODIUM 2000 MG: 2 INJECTION, POWDER, FOR SOLUTION INTRAMUSCULAR; INTRAVENOUS at 07:49

## 2024-08-23 RX ADMIN — NICOTINE 1 PATCH: 21 PATCH, EXTENDED RELEASE TRANSDERMAL at 08:16

## 2024-08-23 NOTE — NURSING NOTE
Patient requested to wait until the CT was completed before tPA given through the chest tube. Will administer when CT complete.

## 2024-08-23 NOTE — PROGRESS NOTES
"Patient name: Ravin Garza  Patient : 1960  VISIT # [id number]  MR #[id number]    Procedure: CT-guided right chest tube placement by radiology  Procedure Date: 2024      Subjective   Chief complaint: Weakness    Resting in bed.  Tolerating room air.  Right pigtail catheter in place to -20 cm suction, no airleak.  Intrapleural tPA continues.  Pain is well-controlled.  Patient is frustrated and states he is going home today but agreeable to stay for repeat CT scan.    ROS: No fevers or chills.  Stable right side chest wall pain.  No shortness of breath       Objective     Visit Vitals  /80 (BP Location: Right arm, Patient Position: Lying)   Pulse 85   Temp 97.9 °F (36.6 °C) (Oral)   Resp 16   Ht 188 cm (74\")   Wt 77.9 kg (171 lb 12.8 oz)   SpO2 98%   BMI 22.06 kg/m²       Intake/Output Summary (Last 24 hours) at 2024 0724  Last data filed at 2024 0401  Gross per 24 hour   Intake 480 ml   Output 240 ml   Net 240 ml     Right pleural pigtail catheter: 140 mL / 24 hours, serosanguineous, no airleak    Lab:     CBC:  Results from last 7 days   Lab Units 24  0921 24  0440 24  0501 24  1325   WBC 10*3/mm3  --  8.21 10.87* 12.29*   HEMATOCRIT % 34.6* 26.3* 25.5* 29.2*   PLATELETS 10*3/mm3  --  314 316 378          BMP:  Results from last 7 days   Lab Units 24  0357 24  0440 24  0501   SODIUM mmol/L 135* 135* 134*   POTASSIUM mmol/L 4.4 4.2 4.1   CHLORIDE mmol/L 108* 109* 106   CO2 mmol/L 19.0* 15.0* 17.0*   GLUCOSE mg/dL 89 89 93   BUN mg/dL 8 8 8   CREATININE mg/dL 0.58* 0.63* 0.56*          COAG:        Invalid input(s): \"PT\"      IMAGES:       Imaging Results (Last 24 Hours)       Procedure Component Value Units Date/Time    XR Chest 1 View [[id number]] Collected: 24     Updated: 24    Narrative:      EXAMINATION: XR CHEST 1 VW-     2024 2:20 AM     HISTORY: right pleural effusion; Z74.09-Other reduced mobility     A " frontal projection of the chest is compared with the previous study  dated 8/22/2024.     The lungs are poorly expanded, worse than the previous study.     A small caliber catheter in the right lower chest is reidentified. No  change in position.     A small loculated fluid along the right lateral chest wall persists.     An ill-defined opacity in the right upper lung is similar to the  previous study and may represent atelectasis or loculated fluid.     There is no pulmonary congestion or pneumothorax.     The heart size is in the normal range.     There is no acute bony abnormality. Hardware fusion of the cervical  spine is partially visualized. Surgical staples along the neck may  represent previous carotid endarterectomy or thyroid surgery.       Impression:      1. Poor lung expansion. No significant change in the cardiopulmonary  status since the previous study. A small caliber right lower chest  catheter is in place.        This report was signed and finalized on 8/23/2024 6:51 AM by Dr. Veronika Gutierrez MD.             CXR: Left pigtail catheter in stable position with improving right pleural effusion.    Physical Exam:  General: Alert, oriented. No apparent distress.  Chronically ill-appearing  Cardiovascular: Regular rate and rhythm without murmur, rubs, or gallops.    Pulmonary: Clear to auscultation bilaterally without wheezing, rubs, or rales.  Chest: Right pleural pigtail catheter in place to -20 cm suction, no airleak, fluid is serosanguineous.  Abdomen: Soft, nondistended, and nontender.  Extremities: Warm, moves all extremities. No edema.   Neurologic:  Grossly intact with no focal deficits.          Impression:  Right pleural effusion  Generalized weakness  Weight loss    Plan:  Continue intrapleural tPA this morning  Repeat CT chest with contrast later this morning  Continue chest tube suction at -20 cm  Flush chest tube with 10 mL of normal saline every 8 hours  Daily chest x-ray  Continue  protein supplements with each meal, repeat prealbumin  Antibiotic management per infectious disease, now on ceftriaxone  Long discussion with patient regarding the risks of leaving AMA and patient states he is agreeable to undergo repeat CT scan but wishes to be discharged today.  Will follow-up with patient later today after reviewing CT scan of the chest.  Discussed with patient      KRIS Moran  08/23/24  07:24 CDT

## 2024-08-23 NOTE — PLAN OF CARE
Goal Outcome Evaluation:              Outcome Evaluation: Nutrition follow up. Pt feels appetite is improving. Average 50% of meals. Drinking ONS as ordered. No new weight or PAB at this time. Continue current regular diet and Boost Plus vanilla. Nutrition following per protocol.

## 2024-08-23 NOTE — PROGRESS NOTES
[facility]  INPATIENT PROGRESS NOTE    Patient Name: Ravin Garza  Date of Admission: 8/15/2024  Today's Date: 08/23/24  Length of Stay: 8  Primary Care Physician: Rk Nelson MD    Subjective   Chief Complaint: f/u empyema, FTT    HPI   Patient seen resting in bed.  On room air.  Feels about the same.  Denies any new complaints.  Appetite continues to be poor but denies any actual pain or difficulty when he eats.  No chest pain.  No dizziness.  No shortness of breath.  No fevers noted.  Telling me he does not really want to stay in the hospital past today.  Discussed complications of current stay.  Chest tube.  Ongoing treatment needs and antibiotics.      Review of Systems   All pertinent negatives and positives are as above. All other systems have been reviewed and are negative unless otherwise stated.     Objective    Temp:  [97.2 °F (36.2 °C)-98.2 °F (36.8 °C)] 97.5 °F (36.4 °C)  Heart Rate:  [85-95] 95  Resp:  [16-18] 16  BP: (117-134)/(67-87) 134/87  Physical Exam  GEN: Awake, alert, interactive, in NAD, appears thin/frail  HEENT: PERRLA, EOMI, Anicteric, Trachea midline  Lungs: diminished R base, no wheezing/rales  Heart: RRR, +S1/s2, no rub  ABD: soft, nt/nd, +BS, no guarding/rebound  Extremities: L AKA, cyanosis, no edema  Skin: no petechiae  Neuro: AAOx3, no focal deficits  Psych: normal mood, flat affect        Results Review:  I have reviewed the labs, radiology results, and diagnostic studies.    Laboratory Data:   Results from last 7 days   Lab Units 08/23/24  1035 08/22/24  0921 08/21/24  0440 08/20/24  0501   WBC 10*3/mm3 10.34  --  8.21 10.87*   HEMOGLOBIN g/dL 7.8* 9.4* 7.4* 7.3*   HEMATOCRIT % 28.2* 34.6* 26.3* 25.5*   PLATELETS 10*3/mm3 572*  --  314 316        Results from last 7 days   Lab Units 08/23/24  0357 08/21/24  0440 08/20/24  0501 08/18/24  0413 08/17/24  0409   SODIUM mmol/L 135* 135* 134*   < > 136   POTASSIUM mmol/L 4.4  4.2 4.1   < > 3.5   CHLORIDE mmol/L 108* 109* 106   < > 100   CO2 mmol/L 19.0* 15.0* 17.0*   < > 24.0   BUN mg/dL 8 8 8   < > 11   CREATININE mg/dL 0.58* 0.63* 0.56*   < > 0.70*   CALCIUM mg/dL 7.8* 7.3* 7.8*   < > 9.5   BILIRUBIN mg/dL 0.3  --  0.7  --  0.7   ALK PHOS U/L 146*  --  146*  --  158*   ALT (SGPT) U/L 14  --  17  --  21   AST (SGOT) U/L 26  --  30  --  43*   GLUCOSE mg/dL 89 89 93   < > 82    < > = values in this interval not displayed.       Culture Data:   Blood Culture   Date Value Ref Range Status   08/16/2024 No growth at 2 days  Preliminary   08/16/2024 No growth at 2 days  Preliminary       Radiology Data:   Imaging Results (Last 24 Hours)       Procedure Component Value Units Date/Time    CT Chest With Contrast Diagnostic [[id number]] Resulted: 08/23/24 1115     Updated: 08/23/24 1137    CT Abdomen Pelvis With Contrast [[id number]] Resulted: 08/23/24 1115     Updated: 08/23/24 1137    XR Chest 1 View [[id number]] Collected: 08/23/24 0648     Updated: 08/23/24 0654    Narrative:      EXAMINATION: XR CHEST 1 VW-     8/23/2024 2:20 AM     HISTORY: right pleural effusion; Z74.09-Other reduced mobility     A frontal projection of the chest is compared with the previous study  dated 8/22/2024.     The lungs are poorly expanded, worse than the previous study.     A small caliber catheter in the right lower chest is reidentified. No  change in position.     A small loculated fluid along the right lateral chest wall persists.     An ill-defined opacity in the right upper lung is similar to the  previous study and may represent atelectasis or loculated fluid.     There is no pulmonary congestion or pneumothorax.     The heart size is in the normal range.     There is no acute bony abnormality. Hardware fusion of the cervical  spine is partially visualized. Surgical staples along the neck may  represent previous carotid endarterectomy or thyroid surgery.       Impression:      1. Poor lung expansion. No  significant change in the cardiopulmonary  status since the previous study. A small caliber right lower chest  catheter is in place.        This report was signed and finalized on 8/23/2024 6:51 AM by Dr. Veronika Gutierrez MD.               I have reviewed the patient's current medications.     Assessment/Plan   Assessment  Active Hospital Problems    Diagnosis     **Empyema lung     Microcytic anemia     Hypomagnesemia     Weight loss     History of traumatic brain injury     Abnormal finding on imaging - outpatient ct abd w/ ? empyema     Hypercalcemia     Severe malnutrition     Generalized weakness        Treatment Plan  #1 lung empyema -right-sided based on CT scan after arrival.  Had pigtail catheter prior which got dislodged.  Went for another pigtail catheter on  8/19. CT still to suction. Getting intrapleural tPA. Has been on Zosyn. Now on ceftriaxone. Ct sx and ID following.    #2 iron deficiency anemia -absolute iron deficiency on arrival.  Hemoccult was negative from outside facility.  Received 1 unit PRBC and hemoglobin has stayed up posttransfusion.  Now status post 3 days of IV iron and on oral.    #3 weight loss/malnutrition/failure to thrive -history of EtOH.  History of TBI.  History of renal cancer with resection in the past.  No clear cancer malignancy seen on CTs to this point.  Nutrition to maximize caloric intake. Repeat abd CT pending today    #4 hypercalcemia -resolved.     #5 hypokalemia -improved with replacements    #6 hypomagnesemia -improved with replacement.    #7 history of EtOH -patient states he stopped drinking about a month ago.  No signs of withdrawal here.  Will need PCP and GI follow-up for monitoring and care postdischarge.  Platelet counts are normal.  INR is normal.      Medical Decision Making  Number and Complexity of problems: Complex case.  4-5 acute problems, multiple chronic problems.  Differential Diagnosis: As above    Conditions and Status        Clinically stable  and nontoxic.  On room air.  Not at goal.     [facility] Data  External documents reviewed: None  Cardiac tracing (EKG, telemetry) interpretation: None  Radiology interpretation: Prior reports reviewed  Labs reviewed: As above  Any tests that were considered but not ordered: None     Decision rules/scores evaluated (example [id number], Wells, etc): None     Discussed with: Patient, Dr. Manzanraes, nursing staff     Care Planning  Shared decision making: Patient apprised of current labs, vitals, imaging and treatment plan.  They are agreeable with proceeding with plans as discussed.    Code status and discussions: DNR but full care otherwise    Disposition  Social Determinants of Health that impact treatment or disposition: none  I expect the patient to be discharged to home when appropriate, likely several days yet pending chest tube/care needs.    Electronically signed by Ramiro Kelly DO, 08/23/24, 12:05 CDT.

## 2024-08-23 NOTE — PROGRESS NOTES
Infectious Diseases Progress Note    Patient:  Ravin Garza  YOB: 1960  MRN: [id number]   Admit date: 8/15/2024   Admitting Physician: Ramiro Kelly DO  Primary Care Physician: Rk Nelson MD    Chief Complaint/Interval History: He is without any symptoms.  He seems to be comfortable.  He is tolerating antibiotic treatment.  He is having no diarrhea or rash.  Results of CT chest and abdomen reviewed.  Interval notes reviewed including thoracic surgery note.  He had initially been insisting on leaving today to thoracic surgery as he had been with me yesterday.  He is indicating at present he is planning to stay per thoracic surgery recommendations through early next week.  I try to speak with him about whether he would prefer home or outpatient IV antibiotic treatment.  He has some support from his sister per his indication, but in talking with him it did not seem he would have consistent assistance at home for IV antibiotic treatment at home.  It may be better initially for us to look into outpatient IV treatment in the [place] area.  Talked with him about PICC line placement.  He is agreeable to PICC line and either outpatient or consideration of home IV antibiotic treatment.  He indicates his primary care provider at the [facility] in [place] has last name Omar.    Intake/Output Summary (Last 24 hours) at 8/23/2024 1432  Last data filed at 8/23/2024 1320  Gross per 24 hour   Intake 960 ml   Output 460 ml   Net 500 ml     Allergies: No Known Allergies  Current Scheduled Medications:   alteplase (ACTIVASE) 10 mg in sodium chloride 0.9 % 30 mL Syringe, 10 mg, Intrapleural, BID   And  dornase alpha (PULMOZYME) 5 mg in sterile water (preservative free) 30 mL intrapleural syringe, 5 mg, Intrapleural, BID  cefTRIAXone, 2,000 mg, Intravenous, Q24H  famotidine, 20 mg, Oral, BID AC  ferrous sulfate, 325 mg, Oral, Daily With Breakfast  nicotine, 1 patch, Transdermal, Q24H  potassium  "chloride, 20 mEq, Oral, BID With Meals  sodium chloride, 10 mL, Intravenous, Q12H  sodium chloride, 10 mL, Intracatheter, Q8H  sodium phosphate, 15 mmol, Intravenous, Once          Current PRN Medications:    acetaminophen **OR** acetaminophen **OR** acetaminophen    senna-docusate sodium **AND** polyethylene glycol **AND** bisacodyl **AND** bisacodyl    calcium carbonate    Calcium Replacement - Follow Nurse / BPA Driven Protocol    Magnesium Standard Dose Replacement - Follow Nurse / BPA Driven Protocol    melatonin    ondansetron    Phosphorus Replacement - Follow Nurse / BPA Driven Protocol    Potassium Replacement - Follow Nurse / BPA Driven Protocol    simethicone    sodium chloride    sodium chloride    Review of Systems see HPI    Vital Signs:  Temp (24hrs), Av.6 °F (36.4 °C), Min:97.2 °F (36.2 °C), Max:98.2 °F (36.8 °C)    /87 (BP Location: Right arm, Patient Position: Sitting)   Pulse 95   Temp 97.5 °F (36.4 °C) (Oral)   Resp 16   Ht 188 cm (74\")   Wt 77.9 kg (171 lb 12.8 oz)   SpO2 99%   BMI 22.06 kg/m²     Physical Exam  Vital signs - reviewed.  Line/IV (peripheral) site - No erythema, warmth, induration, or tenderness.  Comfortable appearing.  No distress.  Mild abdominal distention.  No new findings on exam.    Lab Results:  CBC:   Results from last 7 days   Lab Units 24  1035 24  0921 24  0440 24  0501 24  1325 24  0413 24  0408   WBC 10*3/mm3 10.34  --  8.21 10.87* 12.29* 12.12* 8.08   HEMOGLOBIN g/dL 7.8* 9.4* 7.4* 7.3* 8.0* 8.1* 7.8*   HEMATOCRIT % 28.2* 34.6* 26.3* 25.5* 29.2* 29.5* 28.5*   PLATELETS 10*3/mm3 572*  --  314 316 378 281 190     BMP:  Results from last 7 days   Lab Units 24  0357 24  0440 24  0501 24  0432 24  2120 24  0413 24  0409   SODIUM mmol/L 135* 135* 134* 133*  --  135* 136   POTASSIUM mmol/L 4.4 4.2 4.1 4.2 4.2 3.3* 3.5   CHLORIDE mmol/L 108* 109* 106 102  --  101 100   CO2 " mmol/L 19.0* 15.0* 17.0* 19.0*  --  22.0 24.0   BUN mg/dL 8 8 8 10  --  11 11   CREATININE mg/dL 0.58* 0.63* 0.56* 0.67*  --  0.71* 0.70*   GLUCOSE mg/dL 89 89 93 98  --  95 82   CALCIUM mg/dL 7.8* 7.3* 7.8* 8.4*  --  8.8 9.5   ALT (SGPT) U/L 14  --  17  --   --   --  21     Culture Results:   Blood Culture   Date Value Ref Range Status   08/16/2024 No growth at 5 days   Final   08/16/2024 No growth at 5 days   Final      Body fluid cultures-right pleural cavity August 16, 2024:  Body Fluid Culture     Lab   Streptococcus mitis / oralis Abnormal   [facility]                             Gram Stain   Lab   Many (4+) WBCs seen  [facility]       No organisms seen  [facility]                Susceptibility                 Streptococcus mitis / oralis       JESS       Ceftriaxone <=0.12 ug/ml Susceptible       Penicillin G <=0.06 ug/ml Susceptible       Vancomycin 0.5 ug/ml Susceptible            Radiology:     CT chest done today:  Impression   1. A pigtail catheter is in place within the patient's right posterior  pleural space. There is residual thickening of the visceral and parietal  pleura with a small amount of residual fluid and air within the cavity.  The pigtail catheter is in excellent position. A small left effusion is  present. There is bibasilar atelectasis as well as patchy airspace  disease in the lingular segment of the left upper lobe.  2. The liver is cirrhotic in morphology. There is ascitic fluid  throughout the abdomen and pelvis. The gallbladder is stone filled and  contracted.  3. The colon is normal in appearance. There is no evidence of mechanical ...    CT abdomen and pelvis done today:   IMPRESSION:  1. A pigtail catheter is in place within the patient's right posterior  pleural space. There is residual thickening of the visceral and parietal  pleura with a small amount of residual fluid and air within the cavity.  The pigtail catheter is in excellent position. A small left effusion is  present.  There is bibasilar atelectasis as well as patchy airspace  disease in the lingular segment of the left upper lobe.  2. The liver is cirrhotic in morphology. There is ascitic fluid  throughout the abdomen and pelvis. The gallbladder is stone filled and  contracted.  3. The colon is normal in appearance. There is no evidence of mechanical  obstruction but there is abnormal thickening of several loops of jejunum  within the left mid abdomen. No discrete transition point or mechanical  obstruction. This is a nonspecific finding. There is no pneumatosis  demonstrated. No portal venous gas.  4. There is circumferential hypodensity surrounding the proximal segment  of the superior mesenteric artery. This extends from its origin for  approximately 3.5 cm along its course. There is associated luminal  stenosis of the proximal segment of the SMA. This could represent  inflammatory change surrounding the proximal segment of the SMA or even  potentially a pseudoaneurysm of the proximal segment of this vessel. The  adjacent celiac artery is normal in appearance other than mild  atherosclerosis. This finding is present on the lowermost cut of the  previous CT of the chest dated 8/16/2024. The PRIMO is patent. There is  calcific plaquing and stenosis of the proximal segment of the right  renal artery.  5. Periumbilical hernia containing ascitic fluid. A bowel loop protrudes  slightly into the hernial sac without obstruction or incarceration. A  right inguinal hernia is also present containing fat and some ascitic  fluid.    Additional Studies Reviewed: None    Impression:   1.  Loculated right pleural effusion/empyema-Streptococcus mitis oralis on culture  2.  History of renal cell cancer  3.  Tobacco use  4.  Weight loss  5.  Anemia  6.  Cirrhosis    Recommendations:   Continue ceftriaxone  Recommend PICC line placement  Will plan additional 2 weeks of IV ceftriaxone at discharge  It appears he would have the most support for  outpatient IV antibiotic treatment in [place]  See outpatient IV antibiotic recommendations outlined below  Will consult  to assist with beginning outpatient IV antibiotic arrangements  He may need the outpatient IV antibiotic orders cosigned by one of his treating physicians in [place] (he indicates his outpatient primary care provider is through [facility] with last name Omar)    Outpatient IV antibiotic recommendations (please see progress note from August 23, 2024-he may need a physician who cares for him in the [place] area cosign his outpatient IV antibiotic orders-he indicates his primary care provider is through [facility] and has last name Omar):  1.  Diagnosis right chest empyema-Streptococcus mitis/oralis  2.  IV access-PICC line placement planned prior to discharge  3.  Thoracic surgeon-Presley Manzanares MD  4.  IV antibiotic recommendation:  Ceftriaxone 2 g IV daily through September 9, 2024 (3 weeks total from initiation)  5.  Laboratory monitoring:  CBC, CMP, CRP every Monday while on intravenous antibiotic therapy  6.  Follow-up appointment 2 weeks after hospital discharge    Mickey Zamora MD

## 2024-08-23 NOTE — PLAN OF CARE
Goal Outcome Evaluation:  Plan of Care Reviewed With: patient           Outcome Evaluation: Pt is alert and oriented x 4. Pleasant and cooperative with all cares. Chest tube in place without leaks. Call light with in reach. Will continue to monitor.

## 2024-08-23 NOTE — PLAN OF CARE
Goal Outcome Evaluation:  Plan of Care Reviewed With: patient        Progress: no change  Outcome Evaluation: VSS, no tele. Chest tube in place, TPA given x1 through chest tube. CT chest completed this shift. Phos replaced. Call light in place.

## 2024-08-24 ENCOUNTER — APPOINTMENT (OUTPATIENT)
Dept: GENERAL RADIOLOGY | Facility: HOSPITAL | Age: 64
End: 2024-08-24
Payer: MEDICARE

## 2024-08-24 PROCEDURE — 25010000002 CEFTRIAXONE PER 250 MG: Performed by: FAMILY MEDICINE

## 2024-08-24 PROCEDURE — 71045 X-RAY EXAM CHEST 1 VIEW: CPT

## 2024-08-24 PROCEDURE — 25010000002 LIDOCAINE 1 % SOLUTION: Performed by: INTERNAL MEDICINE

## 2024-08-24 PROCEDURE — 99232 SBSQ HOSP IP/OBS MODERATE 35: CPT | Performed by: SURGERY

## 2024-08-24 PROCEDURE — C1751 CATH, INF, PER/CENT/MIDLINE: HCPCS

## 2024-08-24 PROCEDURE — 25010000002 ALTEPLASE 2 MG RECONSTITUTED SOLUTION 1 EACH VIAL: Performed by: NURSE PRACTITIONER

## 2024-08-24 PROCEDURE — 99232 SBSQ HOSP IP/OBS MODERATE 35: CPT | Performed by: INTERNAL MEDICINE

## 2024-08-24 RX ORDER — SODIUM CHLORIDE 0.9 % (FLUSH) 0.9 %
10 SYRINGE (ML) INJECTION AS NEEDED
Status: DISCONTINUED | OUTPATIENT
Start: 2024-08-24 | End: 2024-08-28 | Stop reason: HOSPADM

## 2024-08-24 RX ORDER — LIDOCAINE HYDROCHLORIDE 10 MG/ML
1 INJECTION, SOLUTION INFILTRATION; PERINEURAL ONCE
Status: COMPLETED | OUTPATIENT
Start: 2024-08-24 | End: 2024-08-24

## 2024-08-24 RX ORDER — SODIUM CHLORIDE 0.9 % (FLUSH) 0.9 %
20 SYRINGE (ML) INJECTION AS NEEDED
Status: DISCONTINUED | OUTPATIENT
Start: 2024-08-24 | End: 2024-08-28 | Stop reason: HOSPADM

## 2024-08-24 RX ORDER — SODIUM CHLORIDE 0.9 % (FLUSH) 0.9 %
10 SYRINGE (ML) INJECTION EVERY 12 HOURS SCHEDULED
Status: DISCONTINUED | OUTPATIENT
Start: 2024-08-24 | End: 2024-08-28 | Stop reason: HOSPADM

## 2024-08-24 RX ORDER — SPIRONOLACTONE 25 MG/1
25 TABLET ORAL DAILY
Status: DISCONTINUED | OUTPATIENT
Start: 2024-08-24 | End: 2024-08-28 | Stop reason: HOSPADM

## 2024-08-24 RX ORDER — SODIUM CHLORIDE 9 MG/ML
40 INJECTION, SOLUTION INTRAVENOUS AS NEEDED
Status: DISCONTINUED | OUTPATIENT
Start: 2024-08-24 | End: 2024-08-28 | Stop reason: HOSPADM

## 2024-08-24 RX ADMIN — POTASSIUM CHLORIDE 20 MEQ: 750 CAPSULE, EXTENDED RELEASE ORAL at 09:00

## 2024-08-24 RX ADMIN — FAMOTIDINE 20 MG: 20 TABLET, FILM COATED ORAL at 16:34

## 2024-08-24 RX ADMIN — SIMETHICONE 80 MG: 80 TABLET, CHEWABLE ORAL at 23:06

## 2024-08-24 RX ADMIN — FAMOTIDINE 20 MG: 20 TABLET, FILM COATED ORAL at 09:00

## 2024-08-24 RX ADMIN — ALTEPLASE 10 MG: 2.2 INJECTION, POWDER, LYOPHILIZED, FOR SOLUTION INTRAVENOUS at 11:05

## 2024-08-24 RX ADMIN — SIMETHICONE 80 MG: 80 TABLET, CHEWABLE ORAL at 05:52

## 2024-08-24 RX ADMIN — WATER 5 MG: 1 INJECTION INTRAMUSCULAR; INTRAVENOUS; SUBCUTANEOUS at 11:04

## 2024-08-24 RX ADMIN — Medication 10 ML: at 22:04

## 2024-08-24 RX ADMIN — ALTEPLASE 10 MG: 2.2 INJECTION, POWDER, LYOPHILIZED, FOR SOLUTION INTRAVENOUS at 22:00

## 2024-08-24 RX ADMIN — CEFTRIAXONE SODIUM 2000 MG: 2 INJECTION, POWDER, FOR SOLUTION INTRAMUSCULAR; INTRAVENOUS at 08:59

## 2024-08-24 RX ADMIN — SIMETHICONE 80 MG: 80 TABLET, CHEWABLE ORAL at 17:41

## 2024-08-24 RX ADMIN — Medication 5 MG: at 23:06

## 2024-08-24 RX ADMIN — WATER 5 MG: 1 INJECTION INTRAMUSCULAR; INTRAVENOUS; SUBCUTANEOUS at 22:00

## 2024-08-24 RX ADMIN — ACETAMINOPHEN 650 MG: 325 TABLET ORAL at 09:03

## 2024-08-24 RX ADMIN — Medication 10 ML: at 16:34

## 2024-08-24 RX ADMIN — FERROUS SULFATE TAB 325 MG (65 MG ELEMENTAL FE) 325 MG: 325 (65 FE) TAB at 09:00

## 2024-08-24 RX ADMIN — SPIRONOLACTONE 25 MG: 25 TABLET ORAL at 14:33

## 2024-08-24 RX ADMIN — NICOTINE 1 PATCH: 21 PATCH, EXTENDED RELEASE TRANSDERMAL at 09:03

## 2024-08-24 RX ADMIN — LIDOCAINE HYDROCHLORIDE 1 ML: 10 INJECTION, SOLUTION INFILTRATION; PERINEURAL at 13:50

## 2024-08-24 RX ADMIN — SIMETHICONE 80 MG: 80 TABLET, CHEWABLE ORAL at 11:15

## 2024-08-24 RX ADMIN — ACETAMINOPHEN 650 MG: 325 TABLET ORAL at 17:41

## 2024-08-24 NOTE — PROGRESS NOTES
[facility] Medicine Services  INPATIENT PROGRESS NOTE    Patient Name: Ravin Garza  Date of Admission: 8/15/2024  Today's Date: 08/24/24  Length of Stay: 9  Primary Care Physician: Rk Nelson MD    Subjective   Chief Complaint: f/u empyema, FTT    HPI   Patient seen resting in bed.  Sister at bedside.  He continues to feel better overall.  Denies shortness of breath.  No fevers.  Continues to have poor appetite denies abdominal pain.  No abdominal pain with p.o. intake.  No nausea.  No vomiting.      Review of Systems   All pertinent negatives and positives are as above. All other systems have been reviewed and are negative unless otherwise stated.     Objective    Temp:  [97.5 °F (36.4 °C)-97.9 °F (36.6 °C)] 97.5 °F (36.4 °C)  Heart Rate:  [77-95] 88  Resp:  [16-18] 18  BP: (113-135)/(76-88) 122/81  Physical Exam  GEN: Awake, alert, interactive, in NAD, appears thin/frail  HEENT: PERRLA, EOMI, Anicteric, Trachea midline  Lungs: diminished R base, no wheezing/rales  Heart: RRR, +S1/s2, no rub  ABD: soft, nt, +BS, no guarding/rebound  Extremities: L AKA, cyanosis, no pitting edema  Skin: no petechiae  Neuro: AAOx3, no focal deficits  Psych: normal mood, flat affect        Results Review:  I have reviewed the labs, radiology results, and diagnostic studies.    Laboratory Data:   Results from last 7 days   Lab Units 08/23/24  1035 08/22/24  0921 08/21/24  0440 08/20/24  0501   WBC 10*3/mm3 10.34  --  8.21 10.87*   HEMOGLOBIN g/dL 7.8* 9.4* 7.4* 7.3*   HEMATOCRIT % 28.2* 34.6* 26.3* 25.5*   PLATELETS 10*3/mm3 572*  --  314 316        Results from last 7 days   Lab Units 08/23/24  0357 08/21/24  0440 08/20/24  0501   SODIUM mmol/L 135* 135* 134*   POTASSIUM mmol/L 4.4 4.2 4.1   CHLORIDE mmol/L 108* 109* 106   CO2 mmol/L 19.0* 15.0* 17.0*   BUN mg/dL 8 8 8   CREATININE mg/dL 0.58* 0.63* 0.56*   CALCIUM mg/dL 7.8* 7.3* 7.8*   BILIRUBIN mg/dL 0.3  --  0.7   ALK PHOS U/L 146*   --  146*   ALT (SGPT) U/L 14  --  17   AST (SGOT) U/L 26  --  30   GLUCOSE mg/dL 89 89 93       Culture Data:   Blood Culture   Date Value Ref Range Status   08/16/2024 No growth at 2 days  Preliminary   08/16/2024 No growth at 2 days  Preliminary       Radiology Data:   Imaging Results (Last 24 Hours)       Procedure Component Value Units Date/Time    XR Chest 1 View [[id number]] Collected: 08/24/24 0851     Updated: 08/24/24 0855    Narrative:      EXAMINATION:  XR CHEST 1 VW-  8/24/2024 2:10 AM     HISTORY: Right pleural effusion. Chest tube. Z74.09-Other reduced  mobility; J86.9-Pyothorax without fistula     COMPARISON: 8/23/2024.     TECHNIQUE: Single view AP image.     FINDINGS: There is hypoventilation and vascular crowding. A right chest  tube overlies the right lung base and stable position. There is some  pleural fluid on the right that appears stable on x-ray. There is  atelectasis in both lung bases. The heart is normal in size. No acute  bony abnormality is seen.          Impression:      1. Pleural fluid on the right appears relatively stable. A right chest  tube remains in place.  2. Hypoventilation. Atelectasis in both lung bases.           This report was signed and finalized on 8/24/2024 8:52 AM by Dr. Fer Jonas MD.       CT Chest With Contrast Diagnostic [[id number]] Collected: 08/23/24 1650     Updated: 08/23/24 1703    Narrative:      EXAMINATION:  CT CHEST W CONTRAST DIAGNOSTIC-  8/23/2024 10:14 AM     HISTORY: Empyema. Z74.09-Other reduced mobility.     COMPARISON : 8/16/2024.     DLP: 750.51 mGy.cm Automated dosage reduction technique was utilized to  reduce patient dosage.     TECHNIQUE: CT was performed of the chest with IV contrast. Sagittal and  coronal images were reconstructed.     MEDIASTINUM, HEART AND VASCULAR STRUCTURES: There is atheromatous  calcification of the thoracic aorta and coronary arteries. The ascending  thoracic aorta is dilated measuring 4.2 cm. The main pulmonary  artery  segment is normal. There are small mediastinal lymph nodes that are not  pathologically enlarged. There is cardiomegaly.     LUNGS: There is a posterior chest tube in the right lung base. There is  residual fluid and air within the pleural space in this location. There  is right lower lobe volume loss. There is patchy infiltrate within the  right lower lobe. There are also patchy infiltrates in the left upper  lobe and left lower lobe. There is a small simple appearing left pleural  effusion.     UPPER ABDOMEN: The liver is macronodular. There are gallstones in the  gallbladder. Spleen size is mildly prominent. There is moderate to large  ascites in the upper abdomen.     BONES: There are degenerative changes of the spine. There has been prior  cervical fusion.          Impression:      1. A pleural-based fluid and air collection in the posterior pleural  space on the right side is again noted. There is still small to moderate  fluid contained within the collection. There is also a large air  collection contained. A right chest tube appears to be in good position.  2. Small left pleural effusion is new compared to the prior study.  3. There is patchy consolidation in the right lower lobe. There is right  lower lobe volume loss. There are new patchy infiltrates in the left  upper lobe and left lower lobe. Edema versus pneumonia.  4. Stable 3-4 mm left upper lobe nodule image 64 series 2. Follow-up CT  is recommended in 12 months per Fleischer Society guidelines in patients  who are at high risk. Optional CT recommended in patients that are not  high risk.  5. Macronodular liver consistent with liver cirrhosis. Moderate to large  ascites in the upper abdomen. Gallstones in the gallbladder.           This report was signed and finalized on 8/23/2024 5:00 PM by Dr. Fer Jonas MD.       CT Abdomen Pelvis With Contrast [[id number]] Collected: 08/23/24 1140     Updated: 08/23/24 1206    Narrative:      CT  ABDOMEN PELVIS W CONTRAST- 8/23/2024 10:14 AM     HISTORY: abdominal pain; Z74.09-Other reduced mobility       COMPARISON: None.     DLP: 750.51 mGy.cm. All CT scans are performed using dose optimization  techniques as appropriate to the performed exam and including at least  one of the following: Automated exposure control, adjustment of the mA  and/or kV according to size, and the use of the iterative reconstruction  technique.     TECHNIQUE: Following the intravenous administration of contrast, helical  CT tomographic images of the abdomen and pelvis were acquired. Coronal  reformatted images were also provided for review.     FINDINGS:  A small left effusion is present. There is a persistent collection  within the posterior costophrenic angle with associated thickening of  the visceral and parietal pleura. There is some residual fluid within  the pleural collection. The chest tube is in excellent position. There  is right basilar atelectasis. Patchy airspace disease in the lingular  segment of left upper lobe is also present. Coronary artery  calcifications are present. No cardiac enlargement.     LIVER: The liver is cirrhotic in morphology. There is ascites within the  abdomen and pelvis including within the perihepatic space. No discrete  hepatic lesion..     BILIARY SYSTEM: The gallbladder is contracted and stone filled. No  biliary dilatation is present..     PANCREAS: No focal pancreatic lesion.     SPLEEN: There is a 1.5 cm hypodensity associated with the spleen likely  represent either a splenic cyst or hemangioma..     KIDNEYS AND ADRENALS: The adrenals are unremarkable. There is a 2.7 cm  cortical cyst involving anterior interpolar aspect of the right kidney.  There is a 3.5 cm cyst involving the lower pole of the right kidney.  Curvilinear radiodensity along the lateral interpolar and lower pole  aspect of the left kidney could represent sequela of a previously  ruptured cyst or may be related to  previous partial nephrectomy. This is  not included in the patient's electronic medical record but I would  favor that this represents postoperative change.. The ureters are  decompressed and normal in appearance.     RETROPERITONEUM: There is hypodensity surrounding the origin and  proximal segment of the superior mesenteric artery with associated  luminal narrowing of the proximal SMA. This measures approximately 2.2 x  2.3 cm in the coronal plane and I would question whether this may  represent a pseudoaneurysm of the proximal segment of the SMA. It  extends for approximately 3.5 cm distal to the origin of the vessel.  Again, there is associated luminal stenosis in the proximal segment of  the SMA. The celiac axis and PRIMO are patent. No retroperitoneal hematoma  or adenopathy demonstrated..     GI TRACT: There is some mild prominence of the wall within several loops  of more proximal jejunum. No discrete transition point demonstrated. No  mechanical obstruction. No evidence of pneumatosis. The appendix is  identified and is normal in appearance.. The colon is normal in  distribution and appearance. No gastric wall thickening or gastric  outlet obstruction. There is a periumbilical hernia containing ascitic  fluid. A short segment of small bowel also protrudes into the hernial  sac but without evidence of obstruction..     OTHER: There is moderate ascites throughout the abdomen and pelvis.  There is mild associated induration in the small bowel mesentery without  evidence of adenopathy or discrete mass.. No evidence of abdominal  aortic aneurysm.. The osseous structures and soft tissues demonstrate no  worrisome lesions. There is a periumbilical hernia containing ascitic  fluid. A short segment of bowel protrudes slightly into the hernial sac  without obstruction or incarceration.     PELVIS: There is ascitic fluid within the pelvis. There is prominence of  the urinary bladder wall. A right inguinal hernia is  present containing  a small amount of ascitic fluid and fat. There is mild third spacing of  fluid with body wall edema present.. No evidence of bladder stone..       Impression:      1. A pigtail catheter is in place within the patient's right posterior  pleural space. There is residual thickening of the visceral and parietal  pleura with a small amount of residual fluid and air within the cavity.  The pigtail catheter is in excellent position. A small left effusion is  present. There is bibasilar atelectasis as well as patchy airspace  disease in the lingular segment of the left upper lobe.  2. The liver is cirrhotic in morphology. There is ascitic fluid  throughout the abdomen and pelvis. The gallbladder is stone filled and  contracted.  3. The colon is normal in appearance. There is no evidence of mechanical  obstruction but there is abnormal thickening of several loops of jejunum  within the left mid abdomen. No discrete transition point or mechanical  obstruction. This is a nonspecific finding. There is no pneumatosis  demonstrated. No portal venous gas.  4. There is circumferential hypodensity surrounding the proximal segment  of the superior mesenteric artery. This extends from its origin for  approximately 3.5 cm along its course. There is associated luminal  stenosis of the proximal segment of the SMA. This could represent  inflammatory change surrounding the proximal segment of the SMA or even  potentially a pseudoaneurysm of the proximal segment of this vessel. The  adjacent celiac artery is normal in appearance other than mild  atherosclerosis. This finding is present on the lowermost cut of the  previous CT of the chest dated 8/16/2024. The PRIMO is patent. There is  calcific plaquing and stenosis of the proximal segment of the right  renal artery.  5. Periumbilical hernia containing ascitic fluid. A bowel loop protrudes  slightly into the hernial sac without obstruction or incarceration. A  right  inguinal hernia is also present containing fat and some ascitic  fluid.        This report was signed and finalized on 8/23/2024 12:03 PM by Dr. Jalen Mccarty MD.               I have reviewed the patient's current medications.     Assessment/Plan   Assessment  Active Hospital Problems    Diagnosis     **Empyema lung     Microcytic anemia     Hypomagnesemia     Weight loss     History of traumatic brain injury     Abnormal finding on imaging - outpatient ct abd w/ ? empyema     Hypercalcemia     Severe malnutrition     Generalized weakness        Treatment Plan  #1 lung empyema -right-sided based on CT scan after arrival.  Had pigtail catheter prior which got dislodged.  Went for another pigtail catheter on  8/19. Chest tube to suction. Getting intrapleural tPA. Has been on Zosyn. Now on ceftriaxone. Ct sx and ID following. Plans for large bore chest tube insertion in OR on Monday and then 2 weeks IV abx outpatient. Will order for PICC line tomorrow.     #2 iron deficiency anemia -absolute iron deficiency on arrival.  Hemoccult was negative from outside facility.  Received 1 unit PRBC and hemoglobin has stayed up posttransfusion.  Now status post 3 days of IV iron and on oral.    #3 weight loss/malnutrition/failure to thrive -history of EtOH.  History of TBI.  History of renal cancer with resection in the past.  No clear cancer malignancy seen on CTs to this point.  Nutrition to maximize caloric intake. Repeat abd CT yesterday was abnormal with evidence of some liver disease and some ascites. There was a question of 3.5 cm segment of hypodensity around the SMA starting at origin. Pseudoaneurysm is in differential but vessel seems patent and it also could just be artifact in setting of ascites and abdominal fluid. Case was discussed with vascular, they recommend repeat CTA to better evaluate on Monday and they will see the patient in evaluation to discuss.     #4 hypercalcemia -resolved.     #5 hypokalemia  -improved with replacements    #6 hypomagnesemia -improved with replacement.    #7 history of EtOH -patient states he stopped drinking about a month ago.  No signs of withdrawal here.  Will need PCP and GI follow-up for monitoring and care postdischarge.  Platelet counts are normal.  INR is normal.  Will start aldactone today given ascites on imaging. If tolerated will start lasix tomorrow.     #8 severe malnutrition - low albumin and pre albumin, on supplements. Encouraged po intake.     Medical Decision Making  Number and Complexity of problems: Complex case.  4-5 acute problems, multiple chronic problems.  Differential Diagnosis: As above    Conditions and Status        Clinically stable and nontoxic.  On room air.  Not at goal.     MDM Data  External documents reviewed: None  Cardiac tracing (EKG, telemetry) interpretation: None  Radiology interpretation: Prior reports reviewed  Labs reviewed: As above  Any tests that were considered but not ordered: None     Decision rules/scores evaluated (example [id number], [place], etc): None     Discussed with: Patient, Dr. Manzanares, nursing staff     Care Planning  Shared decision making: Patient apprised of current labs, vitals, imaging and treatment plan.  They are agreeable with proceeding with plans as discussed.    Code status and discussions: DNR but full care otherwise    Disposition  Social Determinants of Health that impact treatment or disposition: none  I expect the patient to be discharged to home when appropriate, potentially on Monday or Tuesday post chest tube placement pending clinical course.     Electronically signed by Ramiro Kelly DO, 08/24/24, 10:06 CDT.

## 2024-08-24 NOTE — CONSULTS
Patient or patient's representative gives informed consent after an explanation of the risks (air embolism; catheter occlusion; phlebitis; catheter infection; bloodstream infection; vein thrombosis; hematoma/bleeding at the insertion site; slight discomfort; accidental puncture of an artery, nerve, or tendon; dislodging of device), benefits (longer dwell time, outpatient treatment, medications that cannot run peripherally), and alternatives (short peripheral catheter, centrally inserted central line, or permanent catheter) of PICC placement. Time provided to ask and answer questions.    Pt had 4 [place] single lumen PICC placed in left basilic vein. Pt tolerated procedure well. 47 cm of catheter internal and 1 cm external. Catheter to vein ratio 27 %. Tip confirmation by 3CG. All lumens flush and draw well. Sterile dressing applied.

## 2024-08-24 NOTE — PROGRESS NOTES
Infectious Diseases Progress Note    Patient:  Ravin Garza  YOB: 1960  MRN: [id number]   Admit date: 8/15/2024   Admitting Physician: Ramiro Kelly DO  Primary Care Physician: Rk Nelson MD    Chief Complaint/Interval History: He is without new symptoms.  He has no pain at IV site.  He seems to be breathing comfortably.  His sister is at bedside.  Reviewed plan for outpatient IV antibiotics with both the patient and his sister.  She seemed to agree that outpatient IV antibiotics would be manageable for him.  Home IV antibiotic treatment may present some challenges.  He has chest tube that remains in place.  He is not coughing.  He is not dyspneic.  He does continue to have some abdominal tightness/distention and had evidence of ascites on CT scan.    Intake/Output Summary (Last 24 hours) at 8/24/2024 0875  Last data filed at 8/24/2024 0554  Gross per 24 hour   Intake 480 ml   Output 870 ml   Net -390 ml     Allergies: No Known Allergies  Current Scheduled Medications:   alteplase (ACTIVASE) 10 mg in sodium chloride 0.9 % 30 mL Syringe, 10 mg, Intrapleural, BID   And  dornase alpha (PULMOZYME) 5 mg in sterile water (preservative free) 30 mL intrapleural syringe, 5 mg, Intrapleural, BID  cefTRIAXone, 2,000 mg, Intravenous, Q24H  famotidine, 20 mg, Oral, BID AC  ferrous sulfate, 325 mg, Oral, Daily With Breakfast  nicotine, 1 patch, Transdermal, Q24H  potassium chloride, 20 mEq, Oral, BID With Meals  sodium chloride, 10 mL, Intravenous, Q12H  sodium chloride, 10 mL, Intracatheter, Q8H          Current PRN Medications:    acetaminophen **OR** acetaminophen **OR** acetaminophen    senna-docusate sodium **AND** polyethylene glycol **AND** bisacodyl **AND** bisacodyl    calcium carbonate    Calcium Replacement - Follow Nurse / BPA Driven Protocol    Magnesium Standard Dose Replacement - Follow Nurse / BPA Driven Protocol    melatonin    ondansetron    Phosphorus Replacement - Follow Nurse / BPA  "Driven Protocol    Potassium Replacement - Follow Nurse / BPA Driven Protocol    simethicone    sodium chloride    sodium chloride    Review of Systems See HPI    Vital Signs:  Temp (24hrs), Av.6 °F (36.4 °C), Min:97.3 °F (36.3 °C), Max:97.9 °F (36.6 °C)    /81 (BP Location: Right arm, Patient Position: Lying)   Pulse 88   Temp 97.5 °F (36.4 °C) (Oral)   Resp 18   Ht 188 cm (74\")   Wt 77.9 kg (171 lb 12.8 oz)   SpO2 97%   BMI 22.06 kg/m²     Physical Exam  Vital signs - reviewed.  Line/IV site - No erythema, warmth, induration, or tenderness.  Abdomen distended.  Somewhat tight with palpation.  No guarding or rebound.  Lungs without significant crackles or wheezes  General He looks comfortable at present    Lab Results:  CBC:   Results from last 7 days   Lab Units 24  1035 24  0921 24  0440 24  0501 24  1325 24  0413   WBC 10*3/mm3 10.34  --  8.21 10.87* 12.29* 12.12*   HEMOGLOBIN g/dL 7.8* 9.4* 7.4* 7.3* 8.0* 8.1*   HEMATOCRIT % 28.2* 34.6* 26.3* 25.5* 29.2* 29.5*   PLATELETS 10*3/mm3 572*  --  314 316 378 281     BMP:  Results from last 7 days   Lab Units 24  0357 24  0440 24  0501 24  0432 24  2120 24  0413   SODIUM mmol/L 135* 135* 134* 133*  --  135*   POTASSIUM mmol/L 4.4 4.2 4.1 4.2 4.2 3.3*   CHLORIDE mmol/L 108* 109* 106 102  --  101   CO2 mmol/L 19.0* 15.0* 17.0* 19.0*  --  22.0   BUN mg/dL 8 8 8 10  --  11   CREATININE mg/dL 0.58* 0.63* 0.56* 0.67*  --  0.71*   GLUCOSE mg/dL 89 89 93 98  --  95   CALCIUM mg/dL 7.8* 7.3* 7.8* 8.4*  --  8.8   ALT (SGPT) U/L   --    --   --   --      Culture Results:   Body fluid culture 2024:  Body Fluid Culture   Lab   Streptococcus mitis / oralis Abnormal   [facility]                       Gram Stain  Lab   Many (4+) WBCs seen [facility]      No organisms seen [facility]              Susceptibility       Streptococcus mitis / oralis     JESS     Ceftriaxone <=0.12 ug/ml " Susceptible     Penicillin G <=0.06 ug/ml Susceptible     Vancomycin 0.5 ug/ml Susceptible            Radiology:  CT scan of the abdomen and pelvis done yesterday:  IMPRESSION:  1. A pigtail catheter is in place within the patient's right posterior  pleural space. There is residual thickening of the visceral and parietal  pleura with a small amount of residual fluid and air within the cavity.  The pigtail catheter is in excellent position. A small left effusion is  present. There is bibasilar atelectasis as well as patchy airspace  disease in the lingular segment of the left upper lobe.  2. The liver is cirrhotic in morphology. There is ascitic fluid  throughout the abdomen and pelvis. The gallbladder is stone filled and  contracted.  3. The colon is normal in appearance. There is no evidence of mechanical  obstruction but there is abnormal thickening of several loops of jejunum  within the left mid abdomen. No discrete transition point or mechanical  obstruction. This is a nonspecific finding. There is no pneumatosis  demonstrated. No portal venous gas.  4. There is circumferential hypodensity surrounding the proximal segment  of the superior mesenteric artery. This extends from its origin for  approximately 3.5 cm along its course. There is associated luminal  stenosis of the proximal segment of the SMA. This could represent  inflammatory change surrounding the proximal segment of the SMA or even  potentially a pseudoaneurysm of the proximal segment of this vessel. The  adjacent celiac artery is normal in appearance other than mild  atherosclerosis. This finding is present on the lowermost cut of the  previous CT of the chest dated 8/16/2024. The PRIMO is patent. There is  calcific plaquing and stenosis of the proximal segment of the right  renal artery.  5. Periumbilical hernia containing ascitic fluid. A bowel loop protrudes  slightly into the hernial sac without obstruction or incarceration. A  right inguinal  hernia is also present containing fat and some ascitic  fluid.       CT scan of the chest done yesterday:  IMPRESSION:  1. A pleural-based fluid and air collection in the posterior pleural  space on the right side is again noted. There is still small to moderate  fluid contained within the collection. There is also a large air  collection contained. A right chest tube appears to be in good position.  2. Small left pleural effusion is new compared to the prior study.  3. There is patchy consolidation in the right lower lobe. There is right  lower lobe volume loss. There are new patchy infiltrates in the left  upper lobe and left lower lobe. Edema versus pneumonia.  4. Stable 3-4 mm left upper lobe nodule image 64 series 2. Follow-up CT  is recommended in 12 months per Fleischer Society guidelines in patients  who are at high risk. Optional CT recommended in patients that are not  high risk.  5. Macronodular liver consistent with liver cirrhosis. Moderate to large  ascites in the upper abdomen. Gallstones in the gallbladder.    Additional Studies Reviewed: None    Impression:   1.  Loculated right pleural effusion/empyema-Streptococcus mitis/oralis.  2.  Cirrhosis/ascites  3.  History of renal cell cancer  4.  Tobacco use  5.  Weight loss  6.  Anemia    Recommendations:   Continue treatment ceftriaxone  See outpatient IV antibiotic recommendations outlined below-these have been sent to  to assist with outpatient IV antibiotic arrangements  Would recommend diagnostic/therapeutic paracentesis with sample sent for cell count with differential, protein, culture, and cytology  Suggest institution of diuretic management for ascites-Lasix/spironolactone  Continue to follow    Outpatient IV antibiotic recommendations (please see progress note from August 23, 2024-he may need a physician who cares for him in the [facility] cosign his outpatient IV antibiotic orders-he indicates his primary care provider is  through [employer] and has last name Omar):  1.  Diagnosis right chest empyema-Streptococcus mitis/oralis  2.  IV access-PICC line placement planned prior to discharge  3.  Thoracic surgeon-Presley Manzanares MD  4.  IV antibiotic recommendation:  Ceftriaxone 2 g IV daily through September 9, 2024 (3 weeks total from initiation)  5.  Laboratory monitoring:  CBC, CMP, CRP every Monday while on intravenous antibiotic therapy  6.  Follow-up appointment 2 weeks after hospital discharge    Mickey Zamora MD

## 2024-08-24 NOTE — CASE MANAGEMENT/SOCIAL WORK
Continued Stay Note   [place]     Patient Name: Ravin Garza  MRN: [id number]  Today's Date: 8/24/2024    Admit Date: 8/15/2024        Discharge Plan       Row Name 08/24/24 1136       Plan    Plan Comments SW spoke to pt about home IV abx orders. SW discussed outpt at [facility] vs home IV with HH. Pt states he prefers home with HH/IV. IV abx orders are being sent to [facility]. Pt does not have a preference on HH agency ([facility] vs [facility]). MATT will send referral to [facility] and follow up Monday ([phone]).    Noted pt will not be ready for dc until possibly Tuesday. Pt currently has a chest tube.                    Discharge Codes    No documentation.                 Expected Discharge Date and Time       Expected Discharge Date Expected Discharge Time    Aug 26, 2024               LIDIA Chavarria

## 2024-08-24 NOTE — PROGRESS NOTES
"    [facility] Cardiothoracic Surgery  PROGRESS NOTE   CC: Right empyema    Subjective:  Feels okay today, he is anxious to go home endorses that he cannot eat is much as he should    ROS: No fevers or chills hemodynamically stable    Objective:      /81 (BP Location: Right arm, Patient Position: Lying)   Pulse 88   Temp 97.5 °F (36.4 °C) (Oral)   Resp 18   Ht 188 cm (74\")   Wt 77.9 kg (171 lb 12.8 oz)   SpO2 97%   BMI 22.06 kg/m²       Intake/Output Summary (Last 24 hours) at 8/24/2024 1133  Last data filed at 8/24/2024 0554  Gross per 24 hour   Intake 480 ml   Output 670 ml   Net -190 ml       CT Output: 270 cc serosanguineous no airleak    PE:  Vitals:    08/24/24 0709   BP: 122/81   Pulse: 88   Resp: 18   Temp: 97.5 °F (36.4 °C)   SpO2: 97%     GENERAL: NAD, resting comfortably, normal color  CARDIOVASCULAR: regular, regular rate, sinus  PULMONARY: Normal bilateral breath sounds, no labored breathing right chest tube in place  ABDOMEN: soft, nontender/nondistended  EXTREMITIES: mild peripheral edema, normal pulses, normal ROM        Lab Results (last 72 hours)       Procedure Component Value Units Date/Time    Phosphorus [[id number]]  (Normal) Collected: 08/23/24 1841    Specimen: Blood Updated: 08/23/24 1940     Phosphorus 2.9 mg/dL     Prealbumin [[id number]]  (Abnormal) Collected: 08/23/24 1035    Specimen: Blood Updated: 08/23/24 1907     Prealbumin 8.5 mg/dL     Non-gynecologic Cytology [[id number]] Collected: 08/16/24 1550    Specimen: Body Fluid from Pleural Cavity Updated: 08/23/24 1336     Note to Patients --     This report may contain a detailed description of human tissue sent by a health care provider to the laboratory for pathologic evaluation. The content of this report is essential for diagnosis and may provide important critical findings. This information may be unfamiliar to patients to review without a medical professional present. It is advised that the patient " review this report in the presence of a health care provider who can answer questions and explain the results.       Case Report --     Non-gynecologic Cytology                          Case: [id number]                                  Authorizing Provider:  Hilary Doan APRN     Collected:           2024 03:50 PM          Ordering Location:     [address]  Received:            2024 02:32 PM          Pathologist:           Natalie Chavez MD                                                        Specimen:    Pleural Cavity, pleural fluid                                                               Final Diagnosis --     Pleural fluid, side not stated, ThinPrep preparation (1) and cell block:  A.  Marked acute inflammation with cellular degeneration.  B.  Negative for malignant cells.       Gross Description --     1. Pleural Cavity.  Received fresh labeled with patient name and  designated as pleural fluid.  Approximately 8 mls cloudy tan thick fluid.  1 thin prep slide and 1 cell block prepared.       Microscopic Description --     Microscopic examination performed.      CBC (No Diff) [[id number]]  (Abnormal) Collected: 24 1035    Specimen: Blood Updated: 24 1048     WBC 10.34 10*3/mm3      RBC 3.55 10*6/mm3      Hemoglobin 7.8 g/dL      Hematocrit 28.2 %      MCV 79.4 fL      MCH 22.0 pg      MCHC 27.7 g/dL      RDW 28.7 %      RDW-SD 80.6 fl      MPV 8.6 fL      Platelets 572 10*3/mm3     Phosphorus [[id number]]  (Abnormal) Collected: 24    Specimen: Blood Updated: 24 0510     Phosphorus 2.0 mg/dL     Magnesium [[id number]]  (Normal) Collected: 24    Specimen: Blood Updated: 24 0506     Magnesium 1.8 mg/dL     Comprehensive Metabolic Panel [[id number]]  (Abnormal) Collected: 24 035    Specimen: Blood Updated: 24 0506     Glucose 89 mg/dL      BUN 8 mg/dL      Creatinine 0.58 mg/dL      Sodium 135 mmol/L      Potassium  4.4 mmol/L      Comment: Slight hemolysis detected by analyzer. Result may be falsely elevated.        Chloride 108 mmol/L      CO2 19.0 mmol/L      Calcium 7.8 mg/dL      Total Protein 6.1 g/dL      Albumin 2.7 g/dL      ALT (SGPT) 14 U/L      AST (SGOT) 26 U/L      Alkaline Phosphatase 146 U/L      Total Bilirubin 0.3 mg/dL      Globulin 3.4 gm/dL      A/G Ratio 0.8 g/dL      BUN/Creatinine Ratio 13.8     Anion Gap 8.0 mmol/L      eGFR 108.2 mL/min/1.73     Narrative:      GFR Normal >60  Chronic Kidney Disease <60  Kidney Failure <15      Hemoglobin & Hematocrit, Blood [[id number]]  (Abnormal) Collected: 08/22/24 0921    Specimen: Blood Updated: 08/22/24 1036     Hemoglobin 9.4 g/dL      Hematocrit 34.6 %     Blood Culture - Blood, Hand, Right [[id number]]  (Normal) Collected: 08/16/24 1308    Specimen: Blood from Hand, Right Updated: 08/21/24 1400     Blood Culture No growth at 5 days    Blood Culture - Blood, Arm, Right [[id number]]  (Normal) Collected: 08/16/24 1308    Specimen: Blood from Arm, Right Updated: 08/21/24 1400     Blood Culture No growth at 5 days                CXR: Unchanged    Assessment & Plan     64-year-old male with significant debility, severe protein malnutrition current prealbumin less than 9 with a right empyema.  He is being followed by ID who has him on IV antibiotics.  Chest tube drainage has been successful but he has a residual space, and has been undergoing intrapleural lytics but continued space.  He is very anxious to go home.  His white count is normalized he is nontoxic-appearing.    Plan for long-term chest tube, he is not a surgical candidate given his severe protein malnutrition and overall debility I do not believe he would heal a thoracotomy which would be required to treat this  We will plan on chest tube Monday morning in the operating room and then discharged home soon thereafter    Presley Manzanares MD  Cardiothoracic Surgeon

## 2024-08-24 NOTE — PLAN OF CARE
Goal Outcome Evaluation: Pt is alert and oriented, c/o mild pain and discomfort to abdomen, vitals are stable, room air, PICC line placed this shift, atrium replaced this shift, chest tube dressing changed this shift, no s/s of distress, family is at bedside           Progress: improving

## 2024-08-24 NOTE — DISCHARGE PLACEMENT REQUEST
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 while aspirating a needle was advanced into the pleural cavity.  Wire was advanced in the pleural cavity without difficulty.  Dilator was passed over the wire and tract dilated without difficulty.  Attempted to pass pericardiocentesis catheter over the wire but was unable to advance due to thick rind.  Attempted in 2 additional locations but again could not pass pericardiocentesis catheter successfully.  Given this procedure was aborted.  Dressing was placed.  Patient tolerated the procedure well and a stat chest x-ray was ordered.      Specimens: None    EBL: Minimal    Presley Manzanares MD  Cardiothoracic Surgeon     Electronically signed by Presley Manzanares MD at 08/21/24 1448          Physician Progress Notes (last 24 hours)        Presley Manzanares MD at 08/24/24 1133              [facility] Cardiothoracic Surgery  PROGRESS NOTE   CC: Right empyema    Subjective:  Feels okay today, he is anxious to go home endorses that he cannot eat is much as he should    ROS: No fevers or chills hemodynamically  "stable    Objective:      /81 (BP Location: Right arm, Patient Position: Lying)   Pulse 88   Temp 97.5 °F (36.4 °C) (Oral)   Resp 18   Ht 188 cm (74\")   Wt 77.9 kg (171 lb 12.8 oz)   SpO2 97%   BMI 22.06 kg/m²       Intake/Output Summary (Last 24 hours) at 8/24/2024 1133  Last data filed at 8/24/2024 0554  Gross per 24 hour   Intake 480 ml   Output 670 ml   Net -190 ml       CT Output: 270 cc serosanguineous no airleak    PE:  Vitals:    08/24/24 0709   BP: 122/81   Pulse: 88   Resp: 18   Temp: 97.5 °F (36.4 °C)   SpO2: 97%     GENERAL: NAD, resting comfortably, normal color  CARDIOVASCULAR: regular, regular rate, sinus  PULMONARY: Normal bilateral breath sounds, no labored breathing right chest tube in place  ABDOMEN: soft, nontender/nondistended  EXTREMITIES: mild peripheral edema, normal pulses, normal ROM        Lab Results (last 72 hours)       Procedure Component Value Units Date/Time    Phosphorus [[id number]]  (Normal) Collected: 08/23/24 1841    Specimen: Blood Updated: 08/23/24 1940     Phosphorus 2.9 mg/dL     Prealbumin [[id number]]  (Abnormal) Collected: 08/23/24 1035    Specimen: Blood Updated: 08/23/24 1907     Prealbumin 8.5 mg/dL     Non-gynecologic Cytology [[id number]] Collected: 08/16/24 1550    Specimen: Body Fluid from Pleural Cavity Updated: 08/23/24 1336     Note to Patients --     This report may contain a detailed description of human tissue sent by a health care provider to the laboratory for pathologic evaluation. The content of this report is essential for diagnosis and may provide important critical findings. This information may be unfamiliar to patients to review without a medical professional present. It is advised that the patient review this report in the presence of a health care provider who can answer questions and explain the results.       Case Report --     Non-gynecologic Cytology                          Case: [id number]                                "   Authorizing Provider:  Hilary Doan APRN     Collected:           2024 03:50 PM          Ordering Location:     [address]  Received:            2024 02:32 PM          Pathologist:           Natalie Chavez MD                                                        Specimen:    Pleural Cavity, pleural fluid                                                               Final Diagnosis --     Pleural fluid, side not stated, ThinPrep preparation (1) and cell block:  A.  Marked acute inflammation with cellular degeneration.  B.  Negative for malignant cells.       Gross Description --     1. Pleural Cavity.  Received fresh labeled with patient name and  designated as pleural fluid.  Approximately 8 mls cloudy tan thick fluid.  1 thin prep slide and 1 cell block prepared.       Microscopic Description --     Microscopic examination performed.      CBC (No Diff) [[id number]]  (Abnormal) Collected: 24 1035    Specimen: Blood Updated: 24 1048     WBC 10.34 10*3/mm3      RBC 3.55 10*6/mm3      Hemoglobin 7.8 g/dL      Hematocrit 28.2 %      MCV 79.4 fL      MCH 22.0 pg      MCHC 27.7 g/dL      RDW 28.7 %      RDW-SD 80.6 fl      MPV 8.6 fL      Platelets 572 10*3/mm3     Phosphorus [[id number]]  (Abnormal) Collected: 24    Specimen: Blood Updated: 24 0510     Phosphorus 2.0 mg/dL     Magnesium [[id number]]  (Normal) Collected: 24    Specimen: Blood Updated: 24 0506     Magnesium 1.8 mg/dL     Comprehensive Metabolic Panel [[id number]]  (Abnormal) Collected: 24    Specimen: Blood Updated: 24 0506     Glucose 89 mg/dL      BUN 8 mg/dL      Creatinine 0.58 mg/dL      Sodium 135 mmol/L      Potassium 4.4 mmol/L      Comment: Slight hemolysis detected by analyzer. Result may be falsely elevated.        Chloride 108 mmol/L      CO2 19.0 mmol/L      Calcium 7.8 mg/dL      Total Protein 6.1 g/dL      Albumin 2.7 g/dL      ALT (SGPT) 14  U/L      AST (SGOT) 26 U/L      Alkaline Phosphatase 146 U/L      Total Bilirubin 0.3 mg/dL      Globulin 3.4 gm/dL      A/G Ratio 0.8 g/dL      BUN/Creatinine Ratio 13.8     Anion Gap 8.0 mmol/L      eGFR 108.2 mL/min/1.73     Narrative:      GFR Normal >60  Chronic Kidney Disease <60  Kidney Failure <15      Hemoglobin & Hematocrit, Blood [[id number]]  (Abnormal) Collected: 08/22/24 0921    Specimen: Blood Updated: 08/22/24 1036     Hemoglobin 9.4 g/dL      Hematocrit 34.6 %     Blood Culture - Blood, Hand, Right [[id number]]  (Normal) Collected: 08/16/24 1308    Specimen: Blood from Hand, Right Updated: 08/21/24 1400     Blood Culture No growth at 5 days    Blood Culture - Blood, Arm, Right [[id number]]  (Normal) Collected: 08/16/24 1308    Specimen: Blood from Arm, Right Updated: 08/21/24 1400     Blood Culture No growth at 5 days                CXR: Unchanged    Assessment & Plan     64-year-old male with significant debility, severe protein malnutrition current prealbumin less than 9 with a right empyema.  He is being followed by ID who has him on IV antibiotics.  Chest tube drainage has been successful but he has a residual space, and has been undergoing intrapleural lytics but continued space.  He is very anxious to go home.  His white count is normalized he is nontoxic-appearing.    Plan for long-term chest tube, he is not a surgical candidate given his severe protein malnutrition and overall debility I do not believe he would heal a thoracotomy which would be required to treat this  We will plan on chest tube Monday morning in the operating room and then discharged home soon thereafter    Presley Manzanares MD  Cardiothoracic Surgeon      Electronically signed by Presley Manzanares MD at 08/24/24 1135       Mickey Laguerre MD at 08/24/24 9280          Infectious Diseases Progress Note    Patient:  Ravin Garza  YOB: 1960  MRN: [id number]   Admit date: 8/15/2024   Admitting Physician: Robin  Ramiro ZHAO DO  Primary Care Physician: Rk Nelson MD    Chief Complaint/Interval History: He is without new symptoms.  He has no pain at IV site.  He seems to be breathing comfortably.  His sister is at bedside.  Reviewed plan for outpatient IV antibiotics with both the patient and his sister.  She seemed to agree that outpatient IV antibiotics would be manageable for him.  Home IV antibiotic treatment may present some challenges.  He has chest tube that remains in place.  He is not coughing.  He is not dyspneic.  He does continue to have some abdominal tightness/distention and had evidence of ascites on CT scan.    Intake/Output Summary (Last 24 hours) at 8/24/2024 0876  Last data filed at 8/24/2024 0554  Gross per 24 hour   Intake 480 ml   Output 870 ml   Net -390 ml     Allergies: No Known Allergies  Current Scheduled Medications:   alteplase (ACTIVASE) 10 mg in sodium chloride 0.9 % 30 mL Syringe, 10 mg, Intrapleural, BID   And  dornase alpha (PULMOZYME) 5 mg in sterile water (preservative free) 30 mL intrapleural syringe, 5 mg, Intrapleural, BID  cefTRIAXone, 2,000 mg, Intravenous, Q24H  famotidine, 20 mg, Oral, BID AC  ferrous sulfate, 325 mg, Oral, Daily With Breakfast  nicotine, 1 patch, Transdermal, Q24H  potassium chloride, 20 mEq, Oral, BID With Meals  sodium chloride, 10 mL, Intravenous, Q12H  sodium chloride, 10 mL, Intracatheter, Q8H          Current PRN Medications:    acetaminophen **OR** acetaminophen **OR** acetaminophen    senna-docusate sodium **AND** polyethylene glycol **AND** bisacodyl **AND** bisacodyl    calcium carbonate    Calcium Replacement - Follow Nurse / BPA Driven Protocol    Magnesium Standard Dose Replacement - Follow Nurse / BPA Driven Protocol    melatonin    ondansetron    Phosphorus Replacement - Follow Nurse / BPA Driven Protocol    Potassium Replacement - Follow Nurse / BPA Driven Protocol    simethicone    sodium chloride    sodium chloride    Review of Systems See  "HPI    Vital Signs:  Temp (24hrs), Av.6 °F (36.4 °C), Min:97.3 °F (36.3 °C), Max:97.9 °F (36.6 °C)    /81 (BP Location: Right arm, Patient Position: Lying)   Pulse 88   Temp 97.5 °F (36.4 °C) (Oral)   Resp 18   Ht 188 cm (74\")   Wt 77.9 kg (171 lb 12.8 oz)   SpO2 97%   BMI 22.06 kg/m²     Physical Exam  Vital signs - reviewed.  Line/IV site - No erythema, warmth, induration, or tenderness.  Abdomen distended.  Somewhat tight with palpation.  No guarding or rebound.  Lungs without significant crackles or wheezes  General He looks comfortable at present    Lab Results:  CBC:   Results from last 7 days   Lab Units 24  1035 24  0921 24  0440 24  0501 24  1325 24  0413   WBC 10*3/mm3 10.34  --  8.21 10.87* 12.29* 12.12*   HEMOGLOBIN g/dL 7.8* 9.4* 7.4* 7.3* 8.0* 8.1*   HEMATOCRIT % 28.2* 34.6* 26.3* 25.5* 29.2* 29.5*   PLATELETS 10*3/mm3 572*  --  314 316 378 281     BMP:  Results from last 7 days   Lab Units 24  0357 24  0440 24  0501 24  0432 24  2120 24  0413   SODIUM mmol/L 135* 135* 134* 133*  --  135*   POTASSIUM mmol/L 4.4 4.2 4.1 4.2 4.2 3.3*   CHLORIDE mmol/L 108* 109* 106 102  --  101   CO2 mmol/L 19.0* 15.0* 17.0* 19.0*  --  22.0   BUN mg/dL 8 8 8 10  --  11   CREATININE mg/dL 0.58* 0.63* 0.56* 0.67*  --  0.71*   GLUCOSE mg/dL 89 89 93 98  --  95   CALCIUM mg/dL 7.8* 7.3* 7.8* 8.4*  --  8.8   ALT (SGPT) U/L   --  17  --   --   --      Culture Results:   Body fluid culture 2024:  Body Fluid Culture   Lab   Streptococcus mitis / oralis Abnormal   [facility]                       Gram Stain  Lab   Many (4+) WBCs seen  [facility] LAB      No organisms seen [facility]              Susceptibility       Streptococcus mitis / oralis     JESS     Ceftriaxone <=0.12 ug/ml Susceptible     Penicillin G <=0.06 ug/ml Susceptible     Vancomycin 0.5 ug/ml Susceptible            Radiology:  CT scan of the abdomen and pelvis done " yesterday:  IMPRESSION:  1. A pigtail catheter is in place within the patient's right posterior  pleural space. There is residual thickening of the visceral and parietal  pleura with a small amount of residual fluid and air within the cavity.  The pigtail catheter is in excellent position. A small left effusion is  present. There is bibasilar atelectasis as well as patchy airspace  disease in the lingular segment of the left upper lobe.  2. The liver is cirrhotic in morphology. There is ascitic fluid  throughout the abdomen and pelvis. The gallbladder is stone filled and  contracted.  3. The colon is normal in appearance. There is no evidence of mechanical  obstruction but there is abnormal thickening of several loops of jejunum  within the left mid abdomen. No discrete transition point or mechanical  obstruction. This is a nonspecific finding. There is no pneumatosis  demonstrated. No portal venous gas.  4. There is circumferential hypodensity surrounding the proximal segment  of the superior mesenteric artery. This extends from its origin for  approximately 3.5 cm along its course. There is associated luminal  stenosis of the proximal segment of the SMA. This could represent  inflammatory change surrounding the proximal segment of the SMA or even  potentially a pseudoaneurysm of the proximal segment of this vessel. The  adjacent celiac artery is normal in appearance other than mild  atherosclerosis. This finding is present on the lowermost cut of the  previous CT of the chest dated 8/16/2024. The PRIMO is patent. There is  calcific plaquing and stenosis of the proximal segment of the right  renal artery.  5. Periumbilical hernia containing ascitic fluid. A bowel loop protrudes  slightly into the hernial sac without obstruction or incarceration. A  right inguinal hernia is also present containing fat and some ascitic  fluid.       CT scan of the chest done yesterday:  IMPRESSION:  1. A pleural-based fluid and air  collection in the posterior pleural  space on the right side is again noted. There is still small to moderate  fluid contained within the collection. There is also a large air  collection contained. A right chest tube appears to be in good position.  2. Small left pleural effusion is new compared to the prior study.  3. There is patchy consolidation in the right lower lobe. There is right  lower lobe volume loss. There are new patchy infiltrates in the left  upper lobe and left lower lobe. Edema versus pneumonia.  4. Stable 3-4 mm left upper lobe nodule image 64 series 2. Follow-up CT  is recommended in 12 months per Fleischer Society guidelines in patients  who are at high risk. Optional CT recommended in patients that are not  high risk.  5. Macronodular liver consistent with liver cirrhosis. Moderate to large  ascites in the upper abdomen. Gallstones in the gallbladder.    Additional Studies Reviewed: None    Impression:   1.  Loculated right pleural effusion/empyema-Streptococcus mitis/oralis.  2.  Cirrhosis/ascites  3.  History of renal cell cancer  4.  Tobacco use  5.  Weight loss  6.  Anemia    Recommendations:   Continue treatment ceftriaxone  See outpatient IV antibiotic recommendations outlined below-these have been sent to  to assist with outpatient IV antibiotic arrangements  Would recommend diagnostic/therapeutic paracentesis with sample sent for cell count with differential, protein, culture, and cytology  Suggest institution of diuretic management for ascites-Lasix/spironolactone  Continue to follow    Outpatient IV antibiotic recommendations (please see progress note from August 23, 2024-he may need a physician who cares for him in the [facility] cosign his outpatient IV antibiotic orders-he indicates his primary care provider is through [facility] and has last name Omar):  1.  Diagnosis right chest empyema-Streptococcus mitis/oralis  2.  IV access-PICC line placement planned  prior to discharge  3.  Thoracic surgeon-Presley Manzanares MD  4.  IV antibiotic recommendation:  Ceftriaxone 2 g IV daily through September 9, 2024 (3 weeks total from initiation)  5.  Laboratory monitoring:  CBC, CMP, CRP every Monday while on intravenous antibiotic therapy  6.  Follow-up appointment 2 weeks after hospital discharge    Mickey Laguerre MD    Electronically signed by Mickey Laguerre MD at 08/24/24 1033       Mickey Laguerre MD at 08/23/24 1432          Infectious Diseases Progress Note    Patient:  Ravin Garza  YOB: 1960  MRN: [id number]   Admit date: 8/15/2024   Admitting Physician: Ramiro Kelly DO  Primary Care Physician: Rk Nelson MD    Chief Complaint/Interval History: He is without any symptoms.  He seems to be comfortable.  He is tolerating antibiotic treatment.  He is having no diarrhea or rash.  Results of CT chest and abdomen reviewed.  Interval notes reviewed including thoracic surgery note.  He had initially been insisting on leaving today to thoracic surgery as he had been with me yesterday.  He is indicating at present he is planning to stay per thoracic surgery recommendations through early next week.  I try to speak with him about whether he would prefer home or outpatient IV antibiotic treatment.  He has some support from his sister per his indication, but in talking with him it did not seem he would have consistent assistance at home for IV antibiotic treatment at home.  It may be better initially for us to look into outpatient IV treatment in the [place] area.  Talked with him about PICC line placement.  He is agreeable to PICC line and either outpatient or consideration of home IV antibiotic treatment.  He indicates his primary care provider at the [facility] in [place] has last name Omar.    Intake/Output Summary (Last 24 hours) at 8/23/2024 1432  Last data filed at 8/23/2024 1320  Gross per 24 hour   Intake 960 ml   Output 460 ml   Net 500  "ml     Allergies: No Known Allergies  Current Scheduled Medications:   alteplase (ACTIVASE) 10 mg in sodium chloride 0.9 % 30 mL Syringe, 10 mg, Intrapleural, BID   And  dornase alpha (PULMOZYME) 5 mg in sterile water (preservative free) 30 mL intrapleural syringe, 5 mg, Intrapleural, BID  cefTRIAXone, 2,000 mg, Intravenous, Q24H  famotidine, 20 mg, Oral, BID AC  ferrous sulfate, 325 mg, Oral, Daily With Breakfast  nicotine, 1 patch, Transdermal, Q24H  potassium chloride, 20 mEq, Oral, BID With Meals  sodium chloride, 10 mL, Intravenous, Q12H  sodium chloride, 10 mL, Intracatheter, Q8H  sodium phosphate, 15 mmol, Intravenous, Once          Current PRN Medications:    acetaminophen **OR** acetaminophen **OR** acetaminophen    senna-docusate sodium **AND** polyethylene glycol **AND** bisacodyl **AND** bisacodyl    calcium carbonate    Calcium Replacement - Follow Nurse / BPA Driven Protocol    Magnesium Standard Dose Replacement - Follow Nurse / BPA Driven Protocol    melatonin    ondansetron    Phosphorus Replacement - Follow Nurse / BPA Driven Protocol    Potassium Replacement - Follow Nurse / BPA Driven Protocol    simethicone    sodium chloride    sodium chloride    Review of Systems see HPI    Vital Signs:  Temp (24hrs), Av.6 °F (36.4 °C), Min:97.2 °F (36.2 °C), Max:98.2 °F (36.8 °C)    /87 (BP Location: Right arm, Patient Position: Sitting)   Pulse 95   Temp 97.5 °F (36.4 °C) (Oral)   Resp 16   Ht 188 cm (74\")   Wt 77.9 kg (171 lb 12.8 oz)   SpO2 99%   BMI 22.06 kg/m²     Physical Exam  Vital signs - reviewed.  Line/IV (peripheral) site - No erythema, warmth, induration, or tenderness.  Comfortable appearing.  No distress.  Mild abdominal distention.  No new findings on exam.    Lab Results:  CBC:   Results from last 7 days   Lab Units 24  1035 24  0921 24  0440 24  0501 24  1325 24  0413 24  0408   WBC 10*3/mm3 10.34  --  8.21 10.87* 12.29* 12.12* 8.08 "   HEMOGLOBIN g/dL 7.8* 9.4* 7.4* 7.3* 8.0* 8.1* 7.8*   HEMATOCRIT % 28.2* 34.6* 26.3* 25.5* 29.2* 29.5* 28.5*   PLATELETS 10*3/mm3 572*  --  314 316 378 281 190     BMP:  Results from last 7 days   Lab Units 08/23/24  0357 08/21/24  0440 08/20/24  0501 08/19/24  0432 08/18/24  2120 08/18/24  0413 08/17/24  0409   SODIUM mmol/L 135* 135* 134* 133*  --  135* 136   POTASSIUM mmol/L 4.4 4.2 4.1 4.2 4.2 3.3* 3.5   CHLORIDE mmol/L 108* 109* 106 102  --  101 100   CO2 mmol/L 19.0* 15.0* 17.0* 19.0*  --  22.0 24.0   BUN mg/dL 8 8 8 10  --  11 11   CREATININE mg/dL 0.58* 0.63* 0.56* 0.67*  --  0.71* 0.70*   GLUCOSE mg/dL 89 89 93 98  --  95 82   CALCIUM mg/dL 7.8* 7.3* 7.8* 8.4*  --  8.8 9.5   ALT (SGPT) U/L 14  --  17  --   --   --  21     Culture Results:   Blood Culture   Date Value Ref Range Status   08/16/2024 No growth at 5 days   Final   08/16/2024 No growth at 5 days   Final      Body fluid cultures-right pleural cavity August 16, 2024:  Body Fluid Culture     Lab   Streptococcus mitis / oralis Abnormal   [facility]                             Gram Stain   Lab   Many (4+) WBCs seen  [facility]       No organisms seen [facility]                Susceptibility                 Streptococcus mitis / oralis       JESS       Ceftriaxone <=0.12 ug/ml Susceptible       Penicillin G <=0.06 ug/ml Susceptible       Vancomycin 0.5 ug/ml Susceptible            Radiology:     CT chest done today:  Impression   1. A pigtail catheter is in place within the patient's right posterior  pleural space. There is residual thickening of the visceral and parietal  pleura with a small amount of residual fluid and air within the cavity.  The pigtail catheter is in excellent position. A small left effusion is  present. There is bibasilar atelectasis as well as patchy airspace  disease in the lingular segment of the left upper lobe.  2. The liver is cirrhotic in morphology. There is ascitic fluid  throughout the abdomen and pelvis. The gallbladder  is stone filled and  contracted.  3. The colon is normal in appearance. There is no evidence of mechanical ...    CT abdomen and pelvis done today:   IMPRESSION:  1. A pigtail catheter is in place within the patient's right posterior  pleural space. There is residual thickening of the visceral and parietal  pleura with a small amount of residual fluid and air within the cavity.  The pigtail catheter is in excellent position. A small left effusion is  present. There is bibasilar atelectasis as well as patchy airspace  disease in the lingular segment of the left upper lobe.  2. The liver is cirrhotic in morphology. There is ascitic fluid  throughout the abdomen and pelvis. The gallbladder is stone filled and  contracted.  3. The colon is normal in appearance. There is no evidence of mechanical  obstruction but there is abnormal thickening of several loops of jejunum  within the left mid abdomen. No discrete transition point or mechanical  obstruction. This is a nonspecific finding. There is no pneumatosis  demonstrated. No portal venous gas.  4. There is circumferential hypodensity surrounding the proximal segment  of the superior mesenteric artery. This extends from its origin for  approximately 3.5 cm along its course. There is associated luminal  stenosis of the proximal segment of the SMA. This could represent  inflammatory change surrounding the proximal segment of the SMA or even  potentially a pseudoaneurysm of the proximal segment of this vessel. The  adjacent celiac artery is normal in appearance other than mild  atherosclerosis. This finding is present on the lowermost cut of the  previous CT of the chest dated 8/16/2024. The PRIMO is patent. There is  calcific plaquing and stenosis of the proximal segment of the right  renal artery.  5. Periumbilical hernia containing ascitic fluid. A bowel loop protrudes  slightly into the hernial sac without obstruction or incarceration. A  right inguinal hernia is also  present containing fat and some ascitic  fluid.    Additional Studies Reviewed: None    Impression:   1.  Loculated right pleural effusion/empyema-Streptococcus mitis oralis on culture  2.  History of renal cell cancer  3.  Tobacco use  4.  Weight loss  5.  Anemia  6.  Cirrhosis    Recommendations:   Continue ceftriaxone  Recommend PICC line placement  Will plan additional 2 weeks of IV ceftriaxone at discharge  It appears he would have the most support for outpatient IV antibiotic treatment in [place]  See outpatient IV antibiotic recommendations outlined below  Will consult  to assist with beginning outpatient IV antibiotic arrangements  He may need the outpatient IV antibiotic orders cosigned by one of his treating physicians in [place] (he indicates his outpatient primary care provider is through [facility] with last name Omar)    Outpatient IV antibiotic recommendations (please see progress note from August 23, 2024-he may need a physician who cares for him in the [place] area cosign his outpatient IV antibiotic orders-he indicates his primary care provider is through [facility] and has last name Omar):  1.  Diagnosis right chest empyema-Streptococcus mitis/oralis  2.  IV access-PICC line placement planned prior to discharge  3.  Thoracic surgeon-Presley Manzanares MD  4.  IV antibiotic recommendation:  Ceftriaxone 2 g IV daily through September 9, 2024 (3 weeks total from initiation)  5.  Laboratory monitoring:  CBC, CMP, CRP every Monday while on intravenous antibiotic therapy  6.  Follow-up appointment 2 weeks after hospital discharge    Mickey Laguerre MD    Electronically signed by Mickey Laguerre MD at 08/23/24 3921       Ramiro Kelly DO at 08/23/24 1205              [facility]  INPATIENT PROGRESS NOTE    Patient Name: Ravin Garza  Date of Admission: 8/15/2024  Today's Date: 08/23/24  Length of Stay: 8  Primary Care  Physician: Rk Nelson MD    Subjective   Chief Complaint: f/u empyema, FTT    HPI   Patient seen resting in bed.  On room air.  Feels about the same.  Denies any new complaints.  Appetite continues to be poor but denies any actual pain or difficulty when he eats.  No chest pain.  No dizziness.  No shortness of breath.  No fevers noted.  Telling me he does not really want to stay in the hospital past today.  Discussed complications of current stay.  Chest tube.  Ongoing treatment needs and antibiotics.      Review of Systems   All pertinent negatives and positives are as above. All other systems have been reviewed and are negative unless otherwise stated.     Objective    Temp:  [97.2 °F (36.2 °C)-98.2 °F (36.8 °C)] 97.5 °F (36.4 °C)  Heart Rate:  [85-95] 95  Resp:  [16-18] 16  BP: (117-134)/(67-87) 134/87  Physical Exam  GEN: Awake, alert, interactive, in NAD, appears thin/frail  HEENT: PERRLA, EOMI, Anicteric, Trachea midline  Lungs: diminished R base, no wheezing/rales  Heart: RRR, +S1/s2, no rub  ABD: soft, nt/nd, +BS, no guarding/rebound  Extremities: L AKA, cyanosis, no edema  Skin: no petechiae  Neuro: AAOx3, no focal deficits  Psych: normal mood, flat affect        Results Review:  I have reviewed the labs, radiology results, and diagnostic studies.    Laboratory Data:   Results from last 7 days   Lab Units 08/23/24  1035 08/22/24  0921 08/21/24  0440 08/20/24  0501   WBC 10*3/mm3 10.34  --  8.21 10.87*   HEMOGLOBIN g/dL 7.8* 9.4* 7.4* 7.3*   HEMATOCRIT % 28.2* 34.6* 26.3* 25.5*   PLATELETS 10*3/mm3 572*  --  314 316        Results from last 7 days   Lab Units 08/23/24  0357 08/21/24  0440 08/20/24  0501 08/18/24  0413 08/17/24  0409   SODIUM mmol/L 135* 135* 134*   < > 136   POTASSIUM mmol/L 4.4 4.2 4.1   < > 3.5   CHLORIDE mmol/L 108* 109* 106   < > 100   CO2 mmol/L 19.0* 15.0* 17.0*   < > 24.0   BUN mg/dL 8 8 8   < > 11   CREATININE mg/dL 0.58* 0.63* 0.56*   < > 0.70*   CALCIUM mg/dL 7.8* 7.3* 7.8*    < > 9.5   BILIRUBIN mg/dL 0.3  --  0.7  --  0.7   ALK PHOS U/L 146*  --  146*  --  158*   ALT (SGPT) U/L 14  --  17  --  21   AST (SGOT) U/L 26  --  30  --  43*   GLUCOSE mg/dL 89 89 93   < > 82    < > = values in this interval not displayed.       Culture Data:   Blood Culture   Date Value Ref Range Status   08/16/2024 No growth at 2 days  Preliminary   08/16/2024 No growth at 2 days  Preliminary       Radiology Data:   Imaging Results (Last 24 Hours)       Procedure Component Value Units Date/Time    CT Chest With Contrast Diagnostic [[id number]] Resulted: 08/23/24 1115     Updated: 08/23/24 1137    CT Abdomen Pelvis With Contrast [[id number]] Resulted: 08/23/24 1115     Updated: 08/23/24 1137    XR Chest 1 View [[id number]] Collected: 08/23/24 0648     Updated: 08/23/24 0654    Narrative:      EXAMINATION: XR CHEST 1 VW-     8/23/2024 2:20 AM     HISTORY: right pleural effusion; Z74.09-Other reduced mobility     A frontal projection of the chest is compared with the previous study  dated 8/22/2024.     The lungs are poorly expanded, worse than the previous study.     A small caliber catheter in the right lower chest is reidentified. No  change in position.     A small loculated fluid along the right lateral chest wall persists.     An ill-defined opacity in the right upper lung is similar to the  previous study and may represent atelectasis or loculated fluid.     There is no pulmonary congestion or pneumothorax.     The heart size is in the normal range.     There is no acute bony abnormality. Hardware fusion of the cervical  spine is partially visualized. Surgical staples along the neck may  represent previous carotid endarterectomy or thyroid surgery.       Impression:      1. Poor lung expansion. No significant change in the cardiopulmonary  status since the previous study. A small caliber right lower chest  catheter is in place.        This report was signed and finalized on 8/23/2024 6:51 AM by Dr. Veronika BRUSH  MD Brenda.               I have reviewed the patient's current medications.     Assessment/Plan   Assessment  Active Hospital Problems    Diagnosis     **Empyema lung     Microcytic anemia     Hypomagnesemia     Weight loss     History of traumatic brain injury     Abnormal finding on imaging - outpatient ct abd w/ ? empyema     Hypercalcemia     Severe malnutrition     Generalized weakness        Treatment Plan  #1 lung empyema -right-sided based on CT scan after arrival.  Had pigtail catheter prior which got dislodged.  Went for another pigtail catheter on  8/19. CT still to suction. Getting intrapleural tPA. Has been on Zosyn. Now on ceftriaxone. Ct sx and ID following.    #2 iron deficiency anemia -absolute iron deficiency on arrival.  Hemoccult was negative from outside facility.  Received 1 unit PRBC and hemoglobin has stayed up posttransfusion.  Now status post 3 days of IV iron and on oral.    #3 weight loss/malnutrition/failure to thrive -history of EtOH.  History of TBI.  History of renal cancer with resection in the past.  No clear cancer malignancy seen on CTs to this point.  Nutrition to maximize caloric intake. Repeat abd CT pending today    #4 hypercalcemia -resolved.     #5 hypokalemia -improved with replacements    #6 hypomagnesemia -improved with replacement.    #7 history of EtOH -patient states he stopped drinking about a month ago.  No signs of withdrawal here.  Will need PCP and GI follow-up for monitoring and care postdischarge.  Platelet counts are normal.  INR is normal.      Medical Decision Making  Number and Complexity of problems: Complex case.  4-5 acute problems, multiple chronic problems.  Differential Diagnosis: As above    Conditions and Status        Clinically stable and nontoxic.  On room air.  Not at goal.     [facility] Data  External documents reviewed: None  Cardiac tracing (EKG, telemetry) interpretation: None  Radiology interpretation: Prior reports reviewed  Labs reviewed: As  above  Any tests that were considered but not ordered: None     Decision rules/scores evaluated (example [id number], Wells, etc): None     Discussed with: Patient, Dr. Manzanares, nursing staff     Care Planning  Shared decision making: Patient apprised of current labs, vitals, imaging and treatment plan.  They are agreeable with proceeding with plans as discussed.    Code status and discussions: DNR but full care otherwise    Disposition  Social Determinants of Health that impact treatment or disposition: none  I expect the patient to be discharged to home when appropriate, likely several days yet pending chest tube/care needs.    Electronically signed by Ramiro Kelly DO, 24, 12:05 CDT.      Electronically signed by Ramiro Kelly DO at 24 1625       Consult Notes (last 24 hours)  Notes from 24 1140 through 24 1140   No notes of this type exist for this encounter.        [address] [address]  Phone:  [phone]  Fax:  [phone]        Patient: ROOM: [facility]   Ravin Garza MRN:  [id number]   [address] [place] [address] :  1960  SSN:    Phone: [phone] Sex:  M   PCP: Rk Nelson                 Emergency Contact Information       Name Relation Home Work Mobile     Vidhi Borges [phone]          [employer] # SUBSCRIBER ID   Primary:    [employer] REPLACEMENT [id number]   Admitting Diagnosis: Generalized weakness [R53.1]  Order Date:  Aug 23, 2024        Case Management  Consult       (Order ID: [id number])     Diagnosis:         Priority:  Routine Expected Date:   Expiration Date:        Interval:  Once Count:    Comments: Outpatient IV antibiotic recommendations (please see progress note from 2024-he may need a physician who cares for him in the [facility] cosign his outpatient IV antibiotic orders-he indicates his  primary care provider is through [facility] and has last name Omar):  1.  Diagnosis right chest empyema-Streptococcus mitis/oralis  2.  IV access-PICC line placement planned prior to discharge  3.  Thoracic surgeon-Presley Manzanares MD  4.  IV antibiotic recommendation:  Ceftriaxone 2 g IV daily through September 9, 2024 (3 weeks total from initiation)  5.  Laboratory monitoring:  CBC, CMP, CRP every Monday while on intravenous antibiotic therapy  6.  Follow-up appointment 2 weeks after hospital discharge     Mickey Zamora MD  Reason for Consult: Please assist with outpatient IV antibiotic arrangements.  He lives in [place].  Probable discharge Tuesday of next week.        Authorizing Provider:Mickey Zamora MD  Authorizing Provider's NPI: [id number]  Order Entered By: Mickey Zamora MD 8/23/2024  3:03 PM     Electronically signed by: Mickey Zamora MD 8/23/2024  3:03 PM

## 2024-08-25 ENCOUNTER — APPOINTMENT (OUTPATIENT)
Dept: GENERAL RADIOLOGY | Facility: HOSPITAL | Age: 64
End: 2024-08-25
Payer: MEDICARE

## 2024-08-25 LAB
ABO GROUP BLD: NORMAL
ANION GAP SERPL CALCULATED.3IONS-SCNC: 11 MMOL/L (ref 5–15)
BLD GP AB SCN SERPL QL: NEGATIVE
BUN SERPL-MCNC: 10 MG/DL (ref 8–23)
BUN/CREAT SERPL: 16.9 (ref 7–25)
CALCIUM SPEC-SCNC: 9 MG/DL (ref 8.6–10.5)
CHLORIDE SERPL-SCNC: 105 MMOL/L (ref 98–107)
CO2 SERPL-SCNC: 17 MMOL/L (ref 22–29)
CREAT SERPL-MCNC: 0.59 MG/DL (ref 0.76–1.27)
EGFRCR SERPLBLD CKD-EPI 2021: 107.7 ML/MIN/1.73
GLUCOSE SERPL-MCNC: 93 MG/DL (ref 65–99)
POTASSIUM SERPL-SCNC: 4.6 MMOL/L (ref 3.5–5.2)
RH BLD: POSITIVE
SODIUM SERPL-SCNC: 133 MMOL/L (ref 136–145)
T&S EXPIRATION DATE: NORMAL

## 2024-08-25 PROCEDURE — 25010000002 MORPHINE PER 10 MG: Performed by: FAMILY MEDICINE

## 2024-08-25 PROCEDURE — 86901 BLOOD TYPING SEROLOGIC RH(D): CPT | Performed by: NURSE PRACTITIONER

## 2024-08-25 PROCEDURE — 99232 SBSQ HOSP IP/OBS MODERATE 35: CPT | Performed by: INTERNAL MEDICINE

## 2024-08-25 PROCEDURE — 99232 SBSQ HOSP IP/OBS MODERATE 35: CPT | Performed by: SURGERY

## 2024-08-25 PROCEDURE — 97116 GAIT TRAINING THERAPY: CPT

## 2024-08-25 PROCEDURE — 80048 BASIC METABOLIC PNL TOTAL CA: CPT | Performed by: INTERNAL MEDICINE

## 2024-08-25 PROCEDURE — 25010000002 ALTEPLASE 2 MG RECONSTITUTED SOLUTION 1 EACH VIAL: Performed by: NURSE PRACTITIONER

## 2024-08-25 PROCEDURE — 86850 RBC ANTIBODY SCREEN: CPT | Performed by: NURSE PRACTITIONER

## 2024-08-25 PROCEDURE — 86900 BLOOD TYPING SEROLOGIC ABO: CPT | Performed by: NURSE PRACTITIONER

## 2024-08-25 PROCEDURE — 25010000002 CEFTRIAXONE PER 250 MG: Performed by: FAMILY MEDICINE

## 2024-08-25 PROCEDURE — 71045 X-RAY EXAM CHEST 1 VIEW: CPT

## 2024-08-25 RX ORDER — MORPHINE SULFATE 2 MG/ML
2 INJECTION, SOLUTION INTRAMUSCULAR; INTRAVENOUS EVERY 4 HOURS PRN
Status: COMPLETED | OUTPATIENT
Start: 2024-08-25 | End: 2024-08-26

## 2024-08-25 RX ORDER — FUROSEMIDE 20 MG
20 TABLET ORAL DAILY
Status: DISCONTINUED | OUTPATIENT
Start: 2024-08-25 | End: 2024-08-28 | Stop reason: HOSPADM

## 2024-08-25 RX ORDER — OXYCODONE AND ACETAMINOPHEN 5; 325 MG/1; MG/1
1 TABLET ORAL ONCE AS NEEDED
Status: COMPLETED | OUTPATIENT
Start: 2024-08-25 | End: 2024-08-27

## 2024-08-25 RX ADMIN — ALTEPLASE 10 MG: 2.2 INJECTION, POWDER, LYOPHILIZED, FOR SOLUTION INTRAVENOUS at 10:04

## 2024-08-25 RX ADMIN — NICOTINE 1 PATCH: 21 PATCH, EXTENDED RELEASE TRANSDERMAL at 09:42

## 2024-08-25 RX ADMIN — SIMETHICONE 80 MG: 80 TABLET, CHEWABLE ORAL at 22:58

## 2024-08-25 RX ADMIN — WATER 5 MG: 1 INJECTION INTRAMUSCULAR; INTRAVENOUS; SUBCUTANEOUS at 10:04

## 2024-08-25 RX ADMIN — WATER 5 MG: 1 INJECTION INTRAMUSCULAR; INTRAVENOUS; SUBCUTANEOUS at 21:21

## 2024-08-25 RX ADMIN — ACETAMINOPHEN 650 MG: 325 TABLET ORAL at 09:36

## 2024-08-25 RX ADMIN — MORPHINE SULFATE 2 MG: 2 INJECTION, SOLUTION INTRAMUSCULAR; INTRAVENOUS at 23:40

## 2024-08-25 RX ADMIN — SPIRONOLACTONE 25 MG: 25 TABLET ORAL at 09:37

## 2024-08-25 RX ADMIN — ALTEPLASE 10 MG: 2.2 INJECTION, POWDER, LYOPHILIZED, FOR SOLUTION INTRAVENOUS at 21:21

## 2024-08-25 RX ADMIN — FUROSEMIDE 20 MG: 20 TABLET ORAL at 09:36

## 2024-08-25 RX ADMIN — ACETAMINOPHEN 650 MG: 325 TABLET ORAL at 01:45

## 2024-08-25 RX ADMIN — Medication 10 ML: at 09:39

## 2024-08-25 RX ADMIN — FAMOTIDINE 20 MG: 20 TABLET, FILM COATED ORAL at 09:36

## 2024-08-25 RX ADMIN — SIMETHICONE 80 MG: 80 TABLET, CHEWABLE ORAL at 16:50

## 2024-08-25 RX ADMIN — FERROUS SULFATE TAB 325 MG (65 MG ELEMENTAL FE) 325 MG: 325 (65 FE) TAB at 09:37

## 2024-08-25 RX ADMIN — FAMOTIDINE 20 MG: 20 TABLET, FILM COATED ORAL at 16:49

## 2024-08-25 RX ADMIN — SIMETHICONE 80 MG: 80 TABLET, CHEWABLE ORAL at 06:22

## 2024-08-25 RX ADMIN — CEFTRIAXONE SODIUM 2000 MG: 2 INJECTION, POWDER, FOR SOLUTION INTRAMUSCULAR; INTRAVENOUS at 09:38

## 2024-08-25 RX ADMIN — ACETAMINOPHEN 650 MG: 325 TABLET ORAL at 22:58

## 2024-08-25 NOTE — PROGRESS NOTES
[facility]  INPATIENT PROGRESS NOTE    Patient Name: Ravin Garza  Date of Admission: 8/15/2024  Today's Date: 08/25/24  Length of Stay: 10  Primary Care Physician: Rk Nelson MD    Subjective   Chief Complaint: f/u empyema, FTT    HPI   Seen with nursing at bedside.  Resting comfortably in bed.  No new complaints.  No chest pain.  No shortness of breath.  Tolerating p.o.  No vomiting.  No fevers.      Review of Systems   All pertinent negatives and positives are as above. All other systems have been reviewed and are negative unless otherwise stated.     Objective    Temp:  [97.4 °F (36.3 °C)-97.7 °F (36.5 °C)] 97.4 °F (36.3 °C)  Heart Rate:  [79-97] 97  Resp:  [18-20] 18  BP: (106-129)/(80-86) 129/86  Physical Exam  GEN: Awake, alert, interactive, in NAD, appears thin/frail  HEENT: PERRLA, EOMI, Anicteric, Trachea midline  Lungs: diminished R base, no wheezing/rales  Heart: RRR, +S1/s2, no rub  ABD: soft, nt, +BS, no guarding/rebound  Extremities: L AKA, cyanosis, no pitting edema  Skin: no petechiae  Neuro: AAOx3, no focal deficits  Psych: normal mood, flat affect        Results Review:  I have reviewed the labs, radiology results, and diagnostic studies.    Laboratory Data:   Results from last 7 days   Lab Units 08/23/24  1035 08/22/24  0921 08/21/24  0440 08/20/24  0501   WBC 10*3/mm3 10.34  --  8.21 10.87*   HEMOGLOBIN g/dL 7.8* 9.4* 7.4* 7.3*   HEMATOCRIT % 28.2* 34.6* 26.3* 25.5*   PLATELETS 10*3/mm3 572*  --  314 316        Results from last 7 days   Lab Units 08/25/24  0600 08/23/24  0357 08/21/24  0440 08/20/24  0501   SODIUM mmol/L 133* 135* 135* 134*   POTASSIUM mmol/L 4.6 4.4 4.2 4.1   CHLORIDE mmol/L 105 108* 109* 106   CO2 mmol/L 17.0* 19.0* 15.0* 17.0*   BUN mg/dL 10 8 8 8   CREATININE mg/dL 0.59* 0.58* 0.63* 0.56*   CALCIUM mg/dL 9.0 7.8* 7.3* 7.8*   BILIRUBIN mg/dL  --  0.3  --  0.7   ALK PHOS U/L  --  146*  --  146*   ALT (SGPT) U/L   --  14  --  17   AST (SGOT) U/L  --  26  --  30   GLUCOSE mg/dL 93 89 89 93       Culture Data:   Blood Culture   Date Value Ref Range Status   08/16/2024 No growth at 2 days  Preliminary   08/16/2024 No growth at 2 days  Preliminary       Radiology Data:   Imaging Results (Last 24 Hours)       Procedure Component Value Units Date/Time    XR Chest 1 View [[id number]] Collected: 08/25/24 0820     Updated: 08/25/24 0824    Narrative:      EXAMINATION:  XR CHEST 1 VW-  8/25/2024 2:35 AM     HISTORY: Z74.09-Other reduced mobility; J86.9-Pyothorax without fistula.  Right chest tube.     COMPARISON: 8/24/2024.     TECHNIQUE: Single view AP image.     FINDINGS: A right chest tube overlies the right lung base. Pleural and  parenchymal density at the right lung base appears relatively stable.  The left lung remains clear. The heart is normal in size. No acute bony  abnormality is seen. There is a new PICC line catheter via the left arm  with catheter tip near the superior vena cava right atrial junction.          Impression:      1. New PICC line catheter, as described.  2. Pleural and parenchymal opacity at the right lung base is stable.  Right chest tube overlies this area.           This report was signed and finalized on 8/25/2024 8:21 AM by Dr. Fer Jonas MD.               I have reviewed the patient's current medications.     Assessment/Plan   Assessment  Active Hospital Problems    Diagnosis     **Empyema lung     Microcytic anemia     Hypomagnesemia     Weight loss     History of traumatic brain injury     Abnormal finding on imaging - outpatient ct abd w/ ? empyema     Hypercalcemia     Severe malnutrition     Generalized weakness        Treatment Plan  #1 lung empyema -right-sided based on CT scan after arrival.  Had pigtail catheter prior which got dislodged.  Went for another pigtail catheter on  8/19. Chest tube to suction. Getting intrapleural tPA. Has been on Zosyn. Now on ceftriaxone. Ct sx and ID  following. Plans for large bore chest tube insertion in OR on Monday and then 2 weeks IV abx outpatient. PICC line now in place    #2 iron deficiency anemia -absolute iron deficiency on arrival.  Hemoccult was negative from outside facility.  Received 1 unit PRBC and hemoglobin has stayed up posttransfusion.  Now status post 3 days of IV iron and on oral.    #3 weight loss/malnutrition/failure to thrive -history of EtOH.  History of TBI.  History of renal cancer with resection in the past.  No clear cancer malignancy seen on CTs to this point.  Nutrition to maximize caloric intake. Repeat abd CT yesterday was abnormal with evidence of some liver disease and some ascites. There was a question of 3.5 cm segment of hypodensity around the SMA starting at origin. Pseudoaneurysm is in differential but vessel seems patent and it also could just be artifact in setting of ascites and abdominal fluid. Will get a CTA to better evaluate tomorrow and have vascular evaluate.     #4 hypercalcemia -resolved.     #5 hypokalemia -improved with replacements    #6 hypomagnesemia -improved with replacement.    #7 history of EtOH -patient states he stopped drinking about a month ago.  No signs of withdrawal here.  Will need PCP and GI follow-up for monitoring and care postdischarge.  Platelet counts are normal.  INR is normal.  Tolerating Aldactone started yesterday.  Will start some Lasix today.  Could potentially increase Aldactone further after.    #8 severe malnutrition - low albumin and pre albumin, on supplements. Encouraged po intake.     Medical Decision Making  Number and Complexity of problems: Complex case.  4-5 acute problems, multiple chronic problems.  Differential Diagnosis: As above    Conditions and Status        Clinically stable and nontoxic.  On room air.  Not at goal.     [facility] Data  External documents reviewed: None  Cardiac tracing (EKG, telemetry) interpretation: None  Radiology interpretation: Prior reports  reviewed  Labs reviewed: As above  Any tests that were considered but not ordered: None     Decision rules/scores evaluated (example [id number], Wells, etc): None     Discussed with: Patient, nursing staff     Care Planning  Shared decision making: Patient apprised of current labs, vitals, imaging and treatment plan.  They are agreeable with proceeding with plans as discussed.    Code status and discussions: DNR but full care otherwise    Disposition  Social Determinants of Health that impact treatment or disposition: none  I expect the patient to be discharged to home when appropriate, potentially on Monday or Tuesday post chest tube placement pending clinical course.     Electronically signed by Ramiro Kelly DO, 08/25/24, 09:31 CDT.

## 2024-08-25 NOTE — PROGRESS NOTES
Infectious Diseases Progress Note    Patient:  Ravin Garza  YOB: 1960  MRN: [id number]   Admit date: 8/15/2024   Admitting Physician: Ramiro Kelly DO  Primary Care Physician: Rk Nelson MD    Chief Complaint/Interval History: He is without new symptoms.  Abdomen remains distended.  Oral intake modest.  No chest pain at present.  Going to get chest tube placed tomorrow.  Tolerating ceftriaxone without side effect.    Intake/Output Summary (Last 24 hours) at 2024 1317  Last data filed at 2024 1113  Gross per 24 hour   Intake 680 ml   Output 590 ml   Net 90 ml     Allergies: No Known Allergies  Current Scheduled Medications:   alteplase (ACTIVASE) 10 mg in sodium chloride 0.9 % 30 mL Syringe, 10 mg, Intrapleural, BID   And  dornase alpha (PULMOZYME) 5 mg in sterile water (preservative free) 30 mL intrapleural syringe, 5 mg, Intrapleural, BID  cefTRIAXone, 2,000 mg, Intravenous, Q24H  famotidine, 20 mg, Oral, BID AC  ferrous sulfate, 325 mg, Oral, Daily With Breakfast  furosemide, 20 mg, Oral, Daily  nicotine, 1 patch, Transdermal, Q24H  sodium chloride, 10 mL, Intravenous, Q12H  spironolactone, 25 mg, Oral, Daily          Current PRN Medications:    acetaminophen **OR** acetaminophen **OR** acetaminophen    senna-docusate sodium **AND** polyethylene glycol **AND** bisacodyl **AND** bisacodyl    calcium carbonate    Calcium Replacement - Follow Nurse / BPA Driven Protocol    Magnesium Standard Dose Replacement - Follow Nurse / BPA Driven Protocol    melatonin    ondansetron    Phosphorus Replacement - Follow Nurse / BPA Driven Protocol    Potassium Replacement - Follow Nurse / BPA Driven Protocol    simethicone    sodium chloride    sodium chloride    sodium chloride    sodium chloride    sodium chloride    Review of Systems see HPI    Vital Signs:  Temp (24hrs), Av.5 °F (36.4 °C), Min:97.4 °F (36.3 °C), Max:97.7 °F (36.5 °C)    /87 (BP Location: Right arm, Patient  "Position: Lying)   Pulse 84   Temp 97.6 °F (36.4 °C) (Oral)   Resp 18   Ht 188 cm (74\")   Wt 77.9 kg (171 lb 12.8 oz)   SpO2 98%   BMI 22.06 kg/m²     Physical Exam  Vital signs - reviewed.  Line/IV site - No erythema, warmth, induration, or tenderness.  Abdomen distended  Mild abdominal tenderness  No rebound  Extremities trace edema    Lab Results:  CBC:   Results from last 7 days   Lab Units 08/23/24  1035 08/22/24  0921 08/21/24  0440 08/20/24  0501 08/19/24  1325   WBC 10*3/mm3 10.34  --  8.21 10.87* 12.29*   HEMOGLOBIN g/dL 7.8* 9.4* 7.4* 7.3* 8.0*   HEMATOCRIT % 28.2* 34.6* 26.3* 25.5* 29.2*   PLATELETS 10*3/mm3 572*  --  314 316 378     BMP:  Results from last 7 days   Lab Units 08/25/24  0600 08/23/24  0357 08/21/24  0440 08/20/24  0501 08/19/24  0432 08/19/24  0432 08/18/24  2120   SODIUM mmol/L 133* 135* 135* 134*  --  133*  --    POTASSIUM mmol/L 4.6 4.4 4.2 4.1  --  4.2 4.2   CHLORIDE mmol/L 105 108* 109* 106  --  102  --    CO2 mmol/L 17.0* 19.0* 15.0* 17.0*  --  19.0*  --    BUN mg/dL 10 8 8 8  --  10  --    CREATININE mg/dL 0.59* 0.58* 0.63* 0.56*  --  0.67*  --    GLUCOSE mg/dL 93 89 89 93   < > 98  --    CALCIUM mg/dL 9.0 7.8* 7.3* 7.8*  --  8.4*  --    ALT (SGPT) U/L  --  14  --  17  --   --   --     < > = values in this interval not displayed.     Culture Results:   Body fluid culture August 16, 2024:  Body Fluid Culture     Lab   Streptococcus mitis / oralis Abnormal   [facility]                             Gram Stain   Lab   Many (4+) WBCs seen  [facility]       No organisms seen  [facility]                Susceptibility                 Streptococcus mitis / oralis       JESS       Ceftriaxone <=0.12 ug/ml Susceptible       Penicillin G <=0.06 ug/ml Susceptible       Vancomycin 0.5 ug/ml Susceptible              CT scan of the abdomen and pelvis done yesterday:  IMPRESSION:  1. A pigtail catheter is in place within the patient's right posterior  pleural space. There is residual thickening of " the visceral and parietal  pleura with a small amount of residual fluid and air within the cavity.  The pigtail catheter is in excellent position. A small left effusion is  present. There is bibasilar atelectasis as well as patchy airspace  disease in the lingular segment of the left upper lobe.  2. The liver is cirrhotic in morphology. There is ascitic fluid  throughout the abdomen and pelvis. The gallbladder is stone filled and  contracted.  3. The colon is normal in appearance. There is no evidence of mechanical  obstruction but there is abnormal thickening of several loops of jejunum  within the left mid abdomen. No discrete transition point or mechanical  obstruction. This is a nonspecific finding. There is no pneumatosis  demonstrated. No portal venous gas.  4. There is circumferential hypodensity surrounding the proximal segment  of the superior mesenteric artery. This extends from its origin for  approximately 3.5 cm along its course. There is associated luminal  stenosis of the proximal segment of the SMA. This could represent  inflammatory change surrounding the proximal segment of the SMA or even  potentially a pseudoaneurysm of the proximal segment of this vessel. The  adjacent celiac artery is normal in appearance other than mild  atherosclerosis. This finding is present on the lowermost cut of the  previous CT of the chest dated 8/16/2024. The PRIMO is patent. There is  calcific plaquing and stenosis of the proximal segment of the right  renal artery.  5. Periumbilical hernia containing ascitic fluid. A bowel loop protrudes  slightly into the hernial sac without obstruction or incarceration. A  right inguinal hernia is also present containing fat and some ascitic  fluid.        CT scan of the chest done yesterday:  IMPRESSION:  1. A pleural-based fluid and air collection in the posterior pleural  space on the right side is again noted. There is still small to moderate  fluid contained within the  collection. There is also a large air  collection contained. A right chest tube appears to be in good position.  2. Small left pleural effusion is new compared to the prior study.  3. There is patchy consolidation in the right lower lobe. There is right  lower lobe volume loss. There are new patchy infiltrates in the left  upper lobe and left lower lobe. Edema versus pneumonia.  4. Stable 3-4 mm left upper lobe nodule image 64 series 2. Follow-up CT  is recommended in 12 months per Fleischer Society guidelines in patients  who are at high risk. Optional CT recommended in patients that are not  high risk.  5. Macronodular liver consistent with liver cirrhosis. Moderate to large  ascites in the upper abdomen. Gallstones in the gallbladder.     Additional Studies Reviewed: None     Impression:   1.  Loculated right pleural effusion/empyema-Streptococcus mitis/oralis.  He is going to get chest tube placed tomorrow.  He wants to discharge soon.  2.  Cirrhosis/ascites-he has never had previous diagnostic or therapeutic paracentesis.  Agree with diuretic treatment with Lasix and Aldactone.  Feel be reasonable to pursue initial diagnostic/therapeutic paracentesis to make sure parameters consistent with high gradient ascites consistent with cirrhosis and not another etiology for ascites.  He could then hopefully be maintained more chronically with diuretic treatment program and low-sodium diet.  3.  History of renal cell cancer  4.  Tobacco use-recommend cessation  5.  Weight loss-likely combination of #1 and #2  6.  Anemia     Recommendations:   Continue treatment ceftriaxone  See outpatient IV antibiotic recommendations outlined below-these have been sent to  to assist with outpatient IV antibiotic arrangements  Would recommend diagnostic/therapeutic paracentesis with sample sent for cell count with differential, protein, culture, and cytology  Agree with continuing diuretic management of ascites with Lasix  and spironolactone  Continue to follow     Outpatient IV antibiotic recommendations (please see progress note from August 23, 2024-he may need a physician who cares for him in the [facility] cosign his outpatient IV antibiotic orders-he indicates his primary care provider is through [facility] and has last name Omar):  1.  Diagnosis right chest empyema-Streptococcus mitis/oralis  2.  IV access-PICC line placement planned prior to discharge  3.  Thoracic surgeon-Presley Manzanares MD  4.  IV antibiotic recommendation:  Ceftriaxone 2 g IV daily through September 9, 2024 (3 weeks total from initiation)  5.  Laboratory monitoring:  CBC, CMP, CRP every Monday while on intravenous antibiotic therapy  6.  Follow-up appointment 2 weeks after hospital discharge    Mickey Zamora MD

## 2024-08-25 NOTE — PROGRESS NOTES
"    [facility] Cardiothoracic Surgery  PROGRESS NOTE   CC: Right empyema    Subjective:  No change resting comfortably is hemodynamically stable    ROS: No fevers or chills hemodynamically stable    Objective:      /87 (BP Location: Right arm, Patient Position: Lying)   Pulse 84   Temp 97.6 °F (36.4 °C) (Oral)   Resp 18   Ht 188 cm (74\")   Wt 77.9 kg (171 lb 12.8 oz)   SpO2 98%   BMI 22.06 kg/m²       Intake/Output Summary (Last 24 hours) at 8/25/2024 1206  Last data filed at 8/25/2024 1113  Gross per 24 hour   Intake 920 ml   Output 590 ml   Net 330 ml       CT Output: 140 cc serous no airleak    PE:  Vitals:    08/25/24 1113   BP: 120/87   Pulse: 84   Resp: 18   Temp: 97.6 °F (36.4 °C)   SpO2: 98%     GENERAL: NAD, resting comfortably, normal color  CARDIOVASCULAR: regular, regular rate, sinus  PULMONARY: Normal bilateral breath sounds, no labored breathing  ABDOMEN: soft, nontender/nondistended  EXTREMITIES: mild peripheral edema, normal pulses, normal ROM        Lab Results (last 72 hours)       Procedure Component Value Units Date/Time    Basic Metabolic Panel [[id number]]  (Abnormal) Collected: 08/25/24 0600    Specimen: Blood Updated: 08/25/24 0650     Glucose 93 mg/dL      BUN 10 mg/dL      Creatinine 0.59 mg/dL      Sodium 133 mmol/L      Potassium 4.6 mmol/L      Chloride 105 mmol/L      CO2 17.0 mmol/L      Calcium 9.0 mg/dL      BUN/Creatinine Ratio 16.9     Anion Gap 11.0 mmol/L      eGFR 107.7 mL/min/1.73     Narrative:      GFR Normal >60  Chronic Kidney Disease <60  Kidney Failure <15      Phosphorus [[id number]]  (Normal) Collected: 08/23/24 1841    Specimen: Blood Updated: 08/23/24 1940     Phosphorus 2.9 mg/dL     Prealbumin [[id number]]  (Abnormal) Collected: 08/23/24 1035    Specimen: Blood Updated: 08/23/24 1907     Prealbumin 8.5 mg/dL     Non-gynecologic Cytology [[id number]] Collected: 08/16/24 1550    Specimen: Body Fluid from Pleural Cavity Updated: 08/23/24 1336 "     Note to Patients --     This report may contain a detailed description of human tissue sent by a health care provider to the laboratory for pathologic evaluation. The content of this report is essential for diagnosis and may provide important critical findings. This information may be unfamiliar to patients to review without a medical professional present. It is advised that the patient review this report in the presence of a health care provider who can answer questions and explain the results.       Case Report --     Non-gynecologic Cytology                          Case: [id number]                                  Authorizing Provider:  Hilary Doan APRN     Collected:           2024 03:50 PM          Ordering Location:     [address]  Received:            2024 02:32 PM          Pathologist:           Natalie Chavez MD                                                        Specimen:    Pleural Cavity, pleural fluid                                                               Final Diagnosis --     Pleural fluid, side not stated, ThinPrep preparation (1) and cell block:  A.  Marked acute inflammation with cellular degeneration.  B.  Negative for malignant cells.       Gross Description --     1. Pleural Cavity.  Received fresh labeled with patient name and  designated as pleural fluid.  Approximately 8 mls cloudy tan thick fluid.  1 thin prep slide and 1 cell block prepared.       Microscopic Description --     Microscopic examination performed.      CBC (No Diff) [[id number]]  (Abnormal) Collected: 24 1035    Specimen: Blood Updated: 24 1048     WBC 10.34 10*3/mm3      RBC 3.55 10*6/mm3      Hemoglobin 7.8 g/dL      Hematocrit 28.2 %      MCV 79.4 fL      MCH 22.0 pg      MCHC 27.7 g/dL      RDW 28.7 %      RDW-SD 80.6 fl      MPV 8.6 fL      Platelets 572 10*3/mm3     Phosphorus [[id number]]  (Abnormal) Collected: 24 0357    Specimen: Blood Updated:  08/23/24 0510     Phosphorus 2.0 mg/dL     Magnesium [[id number]]  (Normal) Collected: 08/23/24 0357    Specimen: Blood Updated: 08/23/24 0506     Magnesium 1.8 mg/dL     Comprehensive Metabolic Panel [[id number]]  (Abnormal) Collected: 08/23/24 0357    Specimen: Blood Updated: 08/23/24 0506     Glucose 89 mg/dL      BUN 8 mg/dL      Creatinine 0.58 mg/dL      Sodium 135 mmol/L      Potassium 4.4 mmol/L      Comment: Slight hemolysis detected by analyzer. Result may be falsely elevated.        Chloride 108 mmol/L      CO2 19.0 mmol/L      Calcium 7.8 mg/dL      Total Protein 6.1 g/dL      Albumin 2.7 g/dL      ALT (SGPT) 14 U/L      AST (SGOT) 26 U/L      Alkaline Phosphatase 146 U/L      Total Bilirubin 0.3 mg/dL      Globulin 3.4 gm/dL      A/G Ratio 0.8 g/dL      BUN/Creatinine Ratio 13.8     Anion Gap 8.0 mmol/L      eGFR 108.2 mL/min/1.73     Narrative:      GFR Normal >60  Chronic Kidney Disease <60  Kidney Failure <15                  CXR: Unchanged    Assessment & Plan     64-year-old male with significant debility, severe protein malnutrition current prealbumin less than 9 with a right empyema.  He is being followed by ID who has him on IV antibiotics.  Chest tube drainage has been successful but he has a residual space, and has been undergoing intrapleural lytics but continued space.  He is very anxious to go home.  His white count is normalized he is nontoxic-appearing.     Plan for long-term chest tube, he is not a surgical candidate given his severe protein malnutrition and overall debility I do not believe he would heal a thoracotomy which would be required to treat this  We will plan on chest tube tomorrow morning in the operating room and then discharged home soon thereafter    Presley Manzanares MD  Cardiothoracic Surgeon

## 2024-08-25 NOTE — PLAN OF CARE
Goal Outcome Evaluation:  Plan of Care Reviewed With: patient        Progress: improving  Outcome Evaluation: Pt alert and oriented x 4. Pleasant and cooperative with cares. TPA given through CT. no c/o pain or discomfort. Call light within reach. will continue to monitor.

## 2024-08-25 NOTE — THERAPY TREATMENT NOTE
Acute Care - Physical Therapy Treatment Note  [facility]     Patient Name: Ravin Garza  : 1960  MRN: [id number]  Today's Date: 2024      Visit Dx:     ICD-10-CM ICD-9-CM   1. Impaired mobility [Z74.09]  Z74.09 799.89   2. Empyema lung  J86.9 510.9     Patient Active Problem List   Diagnosis    Generalized weakness    Microcytic anemia    Hypomagnesemia    Weight loss    History of traumatic brain injury    Abnormal finding on imaging - outpatient ct abd w/ ? empyema    Hypercalcemia    Severe malnutrition    Empyema lung     Past Medical History:   Diagnosis Date    Amputee, above knee     bilateral    ETOH abuse      History reviewed. No pertinent surgical history.  PT Assessment (Last 12 Hours)       PT Evaluation and Treatment       Row Name 24 0835          Physical Therapy Time and Intention    Subjective Information no complaints  -TB     Document Type therapy note (daily note)  -TB     Mode of Treatment physical therapy  -TB       Row Name 24 0835          General Information    Existing Precautions/Restrictions fall  Chest tube, Prior L BKA  -TB       Row Name 24 0835          Bed Mobility    Bed Mobility scooting/bridging;supine-sit;sit-supine  -TB     Rolling Right [place] (Bed Mobility) modified independence  -TB     Scooting/Bridging [place] (Bed Mobility) modified independence  -TB     Supine-Sit [place] (Bed Mobility) modified independence  -TB     Assistive Device (Bed Mobility) bed rails;head of bed elevated  -TB       Row Name 24 0835          Transfers    Transfers sit-stand transfer;stand-sit transfer;toilet transfer  -TB       Row Name 24 0835          Sit-Stand Transfer    Sit-Stand [place] (Transfers) standby assist  -TB     Assistive Device (Sit-Stand Transfers) walker, front-wheeled  -TB       Row Name 24 0835          Stand-Sit Transfer    Stand-Sit [place] (Transfers) standby assist  -TB     Assistive Device  (Stand-Sit Transfers) walker, front-wheeled  -TB       Row Name 08/25/24 0835          Toilet Transfer    Type (Toilet Transfer) sit-stand;stand-sit  -TB     [place] (Toilet Transfer) contact guard;standby assist  -TB     Assistive Device (Toilet Transfer) commode, bedside without drop arms  -TB       Row Name 08/25/24 0835          Gait/Stairs (Locomotion)    [place] (Gait) contact guard;verbal cues  -TB     Assistive Device (Gait) walker, front-wheeled  -TB     Distance in Feet (Gait) 35  x2  -TB       Row Name 08/25/24 0835          Balance    Balance Assessment sitting static balance;sitting dynamic balance  -TB     Static Sitting Balance independent  -TB     Dynamic Sitting Balance independent  -TB     Position, Sitting Balance sitting edge of bed  -TB       Row Name 08/25/24 0835          Positioning and Restraints    Pre-Treatment Position in bed  -TB     Post Treatment Position bed  -TB     In Bed fowlers;call light within reach;encouraged to call for assist;side rails up x2  -TB               User Key  (r) = Recorded By, (t) = Taken By, (c) = Cosigned By      Initials Name Provider Type    TB Aleja Vargas R, PTA Physical Therapist Assistant                    Physical Therapy Education       Title: PT OT SLP Therapies (Done)       Topic: Physical Therapy (Done)       Point: Mobility training (Done)       Learning Progress Summary             Patient Acceptance, E, VU by CALEB at 8/21/2024 1543    Comment: role of PT, d/c plans if pt wants to return home, assist for OOB activity                         Point: Home exercise program (Done)       Learning Progress Summary             Patient Acceptance, E, VU by CALEB at 8/21/2024 1543    Comment: role of PT, d/c plans if pt wants to return home, assist for OOB activity                         Point: Body mechanics (Done)       Learning Progress Summary             Patient Acceptance, E, VU by CALEB at 8/21/2024 1543    Comment: role of PT,  d/c plans if pt wants to return home, assist for OOB activity                         Point: Precautions (Done)       Learning Progress Summary             Patient Acceptance, E, VU by CALEB at 8/21/2024 5693    Comment: role of PT, d/c plans if pt wants to return home, assist for OOB activity                                         User Key       Initials Effective Dates Name Provider Type Discipline    CALEB 08/15/24 -  Presley Armenta, PT DPT Physical Therapist PT                  PT Recommendation and Plan             Time Calculation:    PT Charges       Row Name 08/25/24 1622 08/25/24 1040          Time Calculation    Start Time 0835  -TB --     Stop Time 0900  -TB --     Time Calculation (min) 25 min  -TB --     PT Received On 08/25/24  -TB 08/25/24  -TB        Time Calculation- PT    Total Timed Code Minutes- PT 25 minute(s)  -TB --               User Key  (r) = Recorded By, (t) = Taken By, (c) = Cosigned By      Initials Name Provider Type    TB Aleja Vargas PTA Physical Therapist Assistant                  Therapy Charges for Today       Code Description Service Date Service Provider Modifiers Qty    91908969456 HC GAIT TRAINING EA 15 MIN 8/25/2024 Aleja Vargas PTA GP 2            PT G-Codes  Outcome Measure Options: AM-PAC 6 Clicks Basic Mobility (PT)  AM-PAC 6 Clicks Score (PT): 18    Aleja Vargas PTA  8/25/2024

## 2024-08-26 ENCOUNTER — ANESTHESIA (OUTPATIENT)
Dept: PERIOP | Facility: HOSPITAL | Age: 64
End: 2024-08-26
Payer: MEDICARE

## 2024-08-26 ENCOUNTER — ANESTHESIA EVENT (OUTPATIENT)
Dept: PERIOP | Facility: HOSPITAL | Age: 64
End: 2024-08-26
Payer: MEDICARE

## 2024-08-26 ENCOUNTER — APPOINTMENT (OUTPATIENT)
Dept: CT IMAGING | Facility: HOSPITAL | Age: 64
End: 2024-08-26
Payer: MEDICARE

## 2024-08-26 ENCOUNTER — APPOINTMENT (OUTPATIENT)
Dept: GENERAL RADIOLOGY | Facility: HOSPITAL | Age: 64
End: 2024-08-26
Payer: MEDICARE

## 2024-08-26 LAB
ALBUMIN SERPL-MCNC: 2.6 G/DL (ref 3.5–5.2)
ALBUMIN/GLOB SERPL: 0.7 G/DL
ALP SERPL-CCNC: 154 U/L (ref 39–117)
ALT SERPL W P-5'-P-CCNC: 12 U/L (ref 1–41)
ANION GAP SERPL CALCULATED.3IONS-SCNC: 12 MMOL/L (ref 5–15)
APTT PPP: 38.8 SECONDS (ref 24.5–36)
AST SERPL-CCNC: 21 U/L (ref 1–40)
BASOPHILS # BLD AUTO: 0.11 10*3/MM3 (ref 0–0.2)
BASOPHILS NFR BLD AUTO: 1 % (ref 0–1.5)
BILIRUB SERPL-MCNC: 0.3 MG/DL (ref 0–1.2)
BUN SERPL-MCNC: 11 MG/DL (ref 8–23)
BUN/CREAT SERPL: 20.4 (ref 7–25)
CALCIUM SPEC-SCNC: 8.1 MG/DL (ref 8.6–10.5)
CHLORIDE SERPL-SCNC: 104 MMOL/L (ref 98–107)
CO2 SERPL-SCNC: 18 MMOL/L (ref 22–29)
CREAT SERPL-MCNC: 0.54 MG/DL (ref 0.76–1.27)
DEPRECATED RDW RBC AUTO: 82.6 FL (ref 37–54)
EGFRCR SERPLBLD CKD-EPI 2021: 110.6 ML/MIN/1.73
EOSINOPHIL # BLD AUTO: 0.27 10*3/MM3 (ref 0–0.4)
EOSINOPHIL NFR BLD AUTO: 2.5 % (ref 0.3–6.2)
ERYTHROCYTE [DISTWIDTH] IN BLOOD BY AUTOMATED COUNT: 29.7 % (ref 12.3–15.4)
GLOBULIN UR ELPH-MCNC: 3.7 GM/DL
GLUCOSE SERPL-MCNC: 92 MG/DL (ref 65–99)
HCT VFR BLD AUTO: 28.2 % (ref 37.5–51)
HGB BLD-MCNC: 7.8 G/DL (ref 13–17.7)
IMM GRANULOCYTES # BLD AUTO: 0.09 10*3/MM3 (ref 0–0.05)
IMM GRANULOCYTES NFR BLD AUTO: 0.8 % (ref 0–0.5)
INR PPP: 1.08 (ref 0.91–1.09)
LYMPHOCYTES # BLD AUTO: 1.57 10*3/MM3 (ref 0.7–3.1)
LYMPHOCYTES NFR BLD AUTO: 14.5 % (ref 19.6–45.3)
MCH RBC QN AUTO: 22 PG (ref 26.6–33)
MCHC RBC AUTO-ENTMCNC: 27.7 G/DL (ref 31.5–35.7)
MCV RBC AUTO: 79.7 FL (ref 79–97)
MONOCYTES # BLD AUTO: 0.66 10*3/MM3 (ref 0.1–0.9)
MONOCYTES NFR BLD AUTO: 6.1 % (ref 5–12)
NEUTROPHILS NFR BLD AUTO: 75.1 % (ref 42.7–76)
NEUTROPHILS NFR BLD AUTO: 8.15 10*3/MM3 (ref 1.7–7)
NRBC BLD AUTO-RTO: 0 /100 WBC (ref 0–0.2)
PLATELET # BLD AUTO: 585 10*3/MM3 (ref 140–450)
PMV BLD AUTO: 8.5 FL (ref 6–12)
POTASSIUM SERPL-SCNC: 4.1 MMOL/L (ref 3.5–5.2)
PROT SERPL-MCNC: 6.3 G/DL (ref 6–8.5)
PROTHROMBIN TIME: 14.4 SECONDS (ref 11.8–14.8)
RBC # BLD AUTO: 3.54 10*6/MM3 (ref 4.14–5.8)
SODIUM SERPL-SCNC: 134 MMOL/L (ref 136–145)
WBC NRBC COR # BLD AUTO: 10.85 10*3/MM3 (ref 3.4–10.8)

## 2024-08-26 PROCEDURE — 93005 ELECTROCARDIOGRAM TRACING: CPT | Performed by: NURSE PRACTITIONER

## 2024-08-26 PROCEDURE — 80053 COMPREHEN METABOLIC PANEL: CPT | Performed by: NURSE PRACTITIONER

## 2024-08-26 PROCEDURE — 25510000001 IOPAMIDOL PER 1 ML: Performed by: FAMILY MEDICINE

## 2024-08-26 PROCEDURE — 85730 THROMBOPLASTIN TIME PARTIAL: CPT | Performed by: NURSE PRACTITIONER

## 2024-08-26 PROCEDURE — 99024 POSTOP FOLLOW-UP VISIT: CPT | Performed by: SURGERY

## 2024-08-26 PROCEDURE — 25010000002 BUPIVACAINE (PF) 0.5 % SOLUTION 30 ML VIAL: Performed by: SURGERY

## 2024-08-26 PROCEDURE — C1729 CATH, DRAINAGE: HCPCS | Performed by: SURGERY

## 2024-08-26 PROCEDURE — 85610 PROTHROMBIN TIME: CPT | Performed by: NURSE PRACTITIONER

## 2024-08-26 PROCEDURE — 85025 COMPLETE CBC W/AUTO DIFF WBC: CPT | Performed by: NURSE PRACTITIONER

## 2024-08-26 PROCEDURE — 25010000002 MORPHINE PER 10 MG: Performed by: SURGERY

## 2024-08-26 PROCEDURE — 99221 1ST HOSP IP/OBS SF/LOW 40: CPT | Performed by: NURSE PRACTITIONER

## 2024-08-26 PROCEDURE — 71045 X-RAY EXAM CHEST 1 VIEW: CPT

## 2024-08-26 PROCEDURE — 25010000002 EPINEPHRINE 1 MG/ML SOLUTION 1 ML AMPULE: Performed by: SURGERY

## 2024-08-26 PROCEDURE — 25010000002 MORPHINE PER 10 MG: Performed by: FAMILY MEDICINE

## 2024-08-26 PROCEDURE — 93010 ELECTROCARDIOGRAM REPORT: CPT | Performed by: INTERNAL MEDICINE

## 2024-08-26 PROCEDURE — 32550 INSERT PLEURAL CATH: CPT | Performed by: SURGERY

## 2024-08-26 PROCEDURE — 25010000002 CEFTRIAXONE PER 250 MG: Performed by: FAMILY MEDICINE

## 2024-08-26 PROCEDURE — 0 BUPIVACAINE LIPOSOME 1.3 % SUSPENSION: Performed by: SURGERY

## 2024-08-26 PROCEDURE — 25010000002 CEFAZOLIN PER 500 MG: Performed by: NURSE PRACTITIONER

## 2024-08-26 PROCEDURE — 74174 CTA ABD&PLVS W/CONTRAST: CPT

## 2024-08-26 PROCEDURE — 25010000002 PROPOFOL 10 MG/ML EMULSION: Performed by: NURSE ANESTHETIST, CERTIFIED REGISTERED

## 2024-08-26 PROCEDURE — 71250 CT THORAX DX C-: CPT

## 2024-08-26 PROCEDURE — 0W9930Z DRAINAGE OF RIGHT PLEURAL CAVITY WITH DRAINAGE DEVICE, PERCUTANEOUS APPROACH: ICD-10-PCS | Performed by: SURGERY

## 2024-08-26 PROCEDURE — 99232 SBSQ HOSP IP/OBS MODERATE 35: CPT | Performed by: INTERNAL MEDICINE

## 2024-08-26 PROCEDURE — C9290 INJ, BUPIVACAINE LIPOSOME: HCPCS | Performed by: SURGERY

## 2024-08-26 PROCEDURE — 25810000003 LACTATED RINGERS PER 1000 ML: Performed by: ANESTHESIOLOGY

## 2024-08-26 RX ORDER — NALOXONE HCL 0.4 MG/ML
0.04 VIAL (ML) INJECTION AS NEEDED
Status: DISCONTINUED | OUTPATIENT
Start: 2024-08-26 | End: 2024-08-26 | Stop reason: HOSPADM

## 2024-08-26 RX ORDER — FENTANYL CITRATE 50 UG/ML
50 INJECTION, SOLUTION INTRAMUSCULAR; INTRAVENOUS
Status: DISCONTINUED | OUTPATIENT
Start: 2024-08-26 | End: 2024-08-26 | Stop reason: HOSPADM

## 2024-08-26 RX ORDER — PROPOFOL 10 MG/ML
VIAL (ML) INTRAVENOUS AS NEEDED
Status: DISCONTINUED | OUTPATIENT
Start: 2024-08-26 | End: 2024-08-26 | Stop reason: SURG

## 2024-08-26 RX ORDER — DROPERIDOL 2.5 MG/ML
0.62 INJECTION, SOLUTION INTRAMUSCULAR; INTRAVENOUS ONCE AS NEEDED
Status: DISCONTINUED | OUTPATIENT
Start: 2024-08-26 | End: 2024-08-26 | Stop reason: HOSPADM

## 2024-08-26 RX ORDER — FLUMAZENIL 0.1 MG/ML
0.2 INJECTION INTRAVENOUS AS NEEDED
Status: DISCONTINUED | OUTPATIENT
Start: 2024-08-26 | End: 2024-08-26 | Stop reason: HOSPADM

## 2024-08-26 RX ORDER — ONDANSETRON 2 MG/ML
4 INJECTION INTRAMUSCULAR; INTRAVENOUS
Status: DISCONTINUED | OUTPATIENT
Start: 2024-08-26 | End: 2024-08-26 | Stop reason: HOSPADM

## 2024-08-26 RX ORDER — FENTANYL CITRATE 50 UG/ML
25 INJECTION, SOLUTION INTRAMUSCULAR; INTRAVENOUS
Status: DISCONTINUED | OUTPATIENT
Start: 2024-08-26 | End: 2024-08-26 | Stop reason: HOSPADM

## 2024-08-26 RX ORDER — SODIUM CHLORIDE, SODIUM LACTATE, POTASSIUM CHLORIDE, CALCIUM CHLORIDE 600; 310; 30; 20 MG/100ML; MG/100ML; MG/100ML; MG/100ML
9 INJECTION, SOLUTION INTRAVENOUS CONTINUOUS
Status: DISCONTINUED | OUTPATIENT
Start: 2024-08-26 | End: 2024-08-28 | Stop reason: HOSPADM

## 2024-08-26 RX ORDER — SODIUM CHLORIDE 0.9 % (FLUSH) 0.9 %
10 SYRINGE (ML) INJECTION AS NEEDED
Status: DISCONTINUED | OUTPATIENT
Start: 2024-08-26 | End: 2024-08-26 | Stop reason: HOSPADM

## 2024-08-26 RX ORDER — LIDOCAINE HYDROCHLORIDE 20 MG/ML
INJECTION, SOLUTION EPIDURAL; INFILTRATION; INTRACAUDAL; PERINEURAL AS NEEDED
Status: DISCONTINUED | OUTPATIENT
Start: 2024-08-26 | End: 2024-08-26 | Stop reason: SURG

## 2024-08-26 RX ORDER — HYDROMORPHONE HYDROCHLORIDE 1 MG/ML
0.5 INJECTION, SOLUTION INTRAMUSCULAR; INTRAVENOUS; SUBCUTANEOUS
Status: DISCONTINUED | OUTPATIENT
Start: 2024-08-26 | End: 2024-08-26 | Stop reason: HOSPADM

## 2024-08-26 RX ORDER — BUPIVACAINE HCL/0.9 % NACL/PF 0.125 %
PLASTIC BAG, INJECTION (ML) EPIDURAL AS NEEDED
Status: DISCONTINUED | OUTPATIENT
Start: 2024-08-26 | End: 2024-08-26 | Stop reason: SURG

## 2024-08-26 RX ORDER — DEXTROSE MONOHYDRATE 25 G/50ML
12.5 INJECTION, SOLUTION INTRAVENOUS AS NEEDED
Status: DISCONTINUED | OUTPATIENT
Start: 2024-08-26 | End: 2024-08-26 | Stop reason: HOSPADM

## 2024-08-26 RX ORDER — BUPIVACAINE HYDROCHLORIDE AND EPINEPHRINE 5; .0091 MG/ML; MG/ML
INJECTION, SOLUTION DENTAL; INFILTRATION AS NEEDED
Status: DISCONTINUED | OUTPATIENT
Start: 2024-08-26 | End: 2024-08-26 | Stop reason: HOSPADM

## 2024-08-26 RX ORDER — LABETALOL HYDROCHLORIDE 5 MG/ML
5 INJECTION, SOLUTION INTRAVENOUS
Status: DISCONTINUED | OUTPATIENT
Start: 2024-08-26 | End: 2024-08-26 | Stop reason: HOSPADM

## 2024-08-26 RX ORDER — SODIUM CHLORIDE 0.9 % (FLUSH) 0.9 %
10 SYRINGE (ML) INJECTION EVERY 12 HOURS SCHEDULED
Status: DISCONTINUED | OUTPATIENT
Start: 2024-08-26 | End: 2024-08-26 | Stop reason: HOSPADM

## 2024-08-26 RX ORDER — IBUPROFEN 600 MG/1
600 TABLET, FILM COATED ORAL EVERY 6 HOURS PRN
Status: DISCONTINUED | OUTPATIENT
Start: 2024-08-26 | End: 2024-08-26 | Stop reason: HOSPADM

## 2024-08-26 RX ORDER — OXYCODONE AND ACETAMINOPHEN 10; 325 MG/1; MG/1
1 TABLET ORAL EVERY 4 HOURS PRN
Status: DISCONTINUED | OUTPATIENT
Start: 2024-08-26 | End: 2024-08-26 | Stop reason: HOSPADM

## 2024-08-26 RX ORDER — IOPAMIDOL 755 MG/ML
100 INJECTION, SOLUTION INTRAVASCULAR
Status: COMPLETED | OUTPATIENT
Start: 2024-08-26 | End: 2024-08-26

## 2024-08-26 RX ADMIN — PROPOFOL 150 MG: 10 INJECTION, EMULSION INTRAVENOUS at 09:59

## 2024-08-26 RX ADMIN — OXYCODONE AND ACETAMINOPHEN 1 TABLET: 325; 10 TABLET ORAL at 11:02

## 2024-08-26 RX ADMIN — CEFAZOLIN 2000 MG: 2 INJECTION, POWDER, FOR SOLUTION INTRAMUSCULAR; INTRAVENOUS at 10:01

## 2024-08-26 RX ADMIN — NICOTINE 1 PATCH: 21 PATCH, EXTENDED RELEASE TRANSDERMAL at 09:02

## 2024-08-26 RX ADMIN — IOPAMIDOL 100 ML: 755 INJECTION, SOLUTION INTRAVENOUS at 16:00

## 2024-08-26 RX ADMIN — FERROUS SULFATE TAB 325 MG (65 MG ELEMENTAL FE) 325 MG: 325 (65 FE) TAB at 09:03

## 2024-08-26 RX ADMIN — SPIRONOLACTONE 25 MG: 25 TABLET ORAL at 09:03

## 2024-08-26 RX ADMIN — FAMOTIDINE 20 MG: 20 TABLET, FILM COATED ORAL at 09:03

## 2024-08-26 RX ADMIN — Medication 150 MCG: at 10:08

## 2024-08-26 RX ADMIN — Medication 10 ML: at 09:03

## 2024-08-26 RX ADMIN — MORPHINE SULFATE 2 MG: 2 INJECTION, SOLUTION INTRAMUSCULAR; INTRAVENOUS at 03:23

## 2024-08-26 RX ADMIN — CEFTRIAXONE SODIUM 2000 MG: 2 INJECTION, POWDER, FOR SOLUTION INTRAMUSCULAR; INTRAVENOUS at 09:02

## 2024-08-26 RX ADMIN — SODIUM CHLORIDE, POTASSIUM CHLORIDE, SODIUM LACTATE AND CALCIUM CHLORIDE 9 ML/HR: 600; 310; 30; 20 INJECTION, SOLUTION INTRAVENOUS at 09:49

## 2024-08-26 RX ADMIN — SIMETHICONE 80 MG: 80 TABLET, CHEWABLE ORAL at 17:48

## 2024-08-26 RX ADMIN — FUROSEMIDE 20 MG: 20 TABLET ORAL at 09:03

## 2024-08-26 RX ADMIN — Medication 10 ML: at 20:32

## 2024-08-26 RX ADMIN — MORPHINE SULFATE 2 MG: 2 INJECTION, SOLUTION INTRAMUSCULAR; INTRAVENOUS at 20:32

## 2024-08-26 RX ADMIN — FAMOTIDINE 20 MG: 20 TABLET, FILM COATED ORAL at 17:19

## 2024-08-26 RX ADMIN — ACETAMINOPHEN 650 MG: 325 TABLET ORAL at 17:48

## 2024-08-26 RX ADMIN — LIDOCAINE HYDROCHLORIDE 10 MG: 20 INJECTION, SOLUTION EPIDURAL; INFILTRATION; INTRACAUDAL; PERINEURAL at 09:59

## 2024-08-26 NOTE — OP NOTE
Cardiothoracic Surgery Operative Note    Pre-op diagnosis: Right Empyema  Severe Protein Malnutrition  Pneumonia  Smoker    Post-op diagnosis: Same     Procedure:  Insertion of indwelling tunneled pleural catheter with cuff, CPT 71977     Surgeon: Presley Manzanares M.D.  Assistant: Obdulia Posey  Anesthesia: MAC  EBL: 5 mL  Drains: 24 [place] Straight Chest Tube Right Pleura  Specimens:  None     Operative indications:    Mr. Garza is a 64-year-old male he has a history of pneumonia and he has a right empyema.  He has had a pigtail catheter placed and undergone intrapleural lytics but has not continued space.  Given this we discussed the risk and benefits of placing a large bore chest tube so that he can be discharged home with this.  He understood and agreed to proceed.    Operative findings  Successful placement of 24 [place] right chest tube with serosanguineous output     Operative description in detail:  The patient was taken to the operative suite where he was placed in a supine position. MAC anesthesia was induced without complication. A timeout was performed. Patient was then prepped and draped in the usual and sterile fashion.      Area was injected with local anesthesia.  2 cm skin incision was made and bluntly the chest was entered at this location.  There was good fogging and serosanguineous fluid output.  24 [place] chest tube was placed at 12 cm at the skin.  Chest tube was sutured in place with 2 silk sutures.  It was connected to closed suction system.  Patient tolerated the procedure well and was transferred to the PACU in stable condition.     Complications: None  Disposition: Transfer to PACU extubated and in stable condition.      Presley Manzanares MD  Cardiothoracic Surgeon

## 2024-08-26 NOTE — NURSING NOTE
[facility]  INPATIENT WOUND & OSTOMY CARE    Today's Date: 08/26/24    Patient Name: Ravin Garza  MRN: [id number]  CSN: [id number]  PCP: Rk Nelson MD  Attending Provider: Matthias Mcmahon MD  Length of Stay: 11    Pressure injury prevention measure ordered per protocol due to patient being at risk for skin breakdown.    Apply silicone foam border dressing per protocol to sacral spine/bilateral heels for protection.  Nursing to change dressing every 3 days and PRN if soiled. Nursing is to peel back dressing with every assessment to assess skin underneath dressing. No barrier cream under dressing.     Please reach out to wound care nurse if any skin issues arise.     This document has been electronically signed by Catina Estrada RN on 8/26/2024 09:26 CDT

## 2024-08-26 NOTE — PROGRESS NOTES
"Patient name: Ravin Garza  Patient : 1960  VISIT # [id number]  MR #[id number]    Procedure: CT-guided right chest tube placement by radiology  Procedure Date: 2024      Subjective   Chief complaint: Weakness    Resting in bed.  Tolerating room air.  Right pigtail catheter in place to -20 cm suction, no airleak.  Intrapleural tPA administered over the weekend.  He is n.p.o. for large bore right chest tube placement by Dr. Manzanares today.    ROS: No fevers or chills.  Stable right side chest wall pain.  No shortness of breath       Objective     Visit Vitals  /82 (BP Location: Right arm, Patient Position: Lying)   Pulse 93   Temp 97.4 °F (36.3 °C) (Oral)   Resp 16   Ht 188 cm (74\")   Wt 77.9 kg (171 lb 12.8 oz)   SpO2 98%   BMI 22.06 kg/m²       Intake/Output Summary (Last 24 hours) at 2024 0915  Last data filed at 2024 0748  Gross per 24 hour   Intake 480 ml   Output 1510 ml   Net -1030 ml     Right pleural pigtail catheter: 160 mL / 24 hours, serosanguineous, no airleak    Lab:     CBC:  Results from last 7 days   Lab Units 24  0332 24  1035 24  0921 24  0440   WBC 10*3/mm3 10.85* 10.34  --  8.21   HEMATOCRIT % 28.2* 28.2* 34.6* 26.3*   PLATELETS 10*3/mm3 585* 572*  --  314          BMP:  Results from last 7 days   Lab Units 24  0332 24  0600 24  0357   SODIUM mmol/L 134* 133* 135*   POTASSIUM mmol/L 4.1 4.6 4.4   CHLORIDE mmol/L 104 105 108*   CO2 mmol/L 18.0* 17.0* 19.0*   GLUCOSE mg/dL 92 93 89   BUN mg/dL 11 10 8   CREATININE mg/dL 0.54* 0.59* 0.58*          COAG:  Results from last 7 days   Lab Units 24  0332   INR  1.08   APTT seconds 38.8*         IMAGES:       Imaging Results (Last 24 Hours)       Procedure Component Value Units Date/Time    XR Chest 1 View [[id number]] Collected: 24     Updated: 24    Narrative:      EXAMINATION: XR CHEST 1 VW-     2024 2:15 AM     HISTORY: right pleural effusion; " Z74.09-Other reduced mobility;  J86.9-Pyothorax without fistula     1 view chest x-ray.     COMPARISON:  Yesterday at 3:17 a.m.     Normal heart and mediastinum.     Fully expanded lungs.     Unchanged pigtail catheter within the lower RIGHT hemithorax.  Trace loculated fluid along the RIGHT lateral hemithorax.  Mild RIGHT basilar atelectasis.     No pneumothorax.     The LEFT lung remains clear.     The LEFT PICC line remains in good position with the tip in the atrium.       Impression:      1. No significant change.           This report was signed and finalized on 8/26/2024 7:13 AM by Dr. Mark Gibson MD.             CXR: Left pigtail catheter in stable position with improving right pleural effusion.  Stable exam    Physical Exam:  General: Alert, oriented. No apparent distress.  Chronically ill-appearing  Cardiovascular: Regular rate and rhythm without murmur, rubs, or gallops.    Pulmonary: Clear to auscultation bilaterally without wheezing, rubs, or rales.  Chest: Right pleural pigtail catheter in place to -20 cm suction, no airleak, fluid is serosanguineous.  Abdomen: Soft, nondistended, and nontender.  Extremities: Warm, moves all extremities. No edema.   Neurologic:  Grossly intact with no focal deficits.          Impression:  Right pleural effusion  Generalized weakness  Weight loss    Plan:  Remain n.p.o.  To OR for right sided chest tube placement by Dr. Manzanares this morning.  Will need to transition to Heimlich valve prior to discharge  Continue protein supplements with each meal, repeat prealbumin  Antibiotic management per infectious disease, now on ceftriaxone  Discussed with patient      KRIS Moran  08/26/24  09:15 CDT

## 2024-08-26 NOTE — PROGRESS NOTES
"Infectious Diseases Progress Note    Patient:  Ravin Garza  YOB: 1960  MRN: [id number]   Admit date: 8/15/2024   Admitting Physician: Matthias Mcmahon MD  Primary Care Physician: Rk Nelson MD    Chief Complaint/Interval History: He feels okay.  He would like paracentesis as his abdomen feels tight/distended.  He would like it for symptomatic treatment.  He is not having any productive cough.  He feels about the same.  He is n.p.o. for chest tube placement today.    Intake/Output Summary (Last 24 hours) at 2024 0727  Last data filed at 2024 0600  Gross per 24 hour   Intake 480 ml   Output 1460 ml   Net -980 ml     Allergies: No Known Allergies  Current Scheduled Medications:   cefTRIAXone, 2,000 mg, Intravenous, Q24H  famotidine, 20 mg, Oral, BID AC  ferrous sulfate, 325 mg, Oral, Daily With Breakfast  furosemide, 20 mg, Oral, Daily  nicotine, 1 patch, Transdermal, Q24H  sodium chloride, 10 mL, Intravenous, Q12H  spironolactone, 25 mg, Oral, Daily        Current PRN Medications:    acetaminophen **OR** acetaminophen **OR** acetaminophen    senna-docusate sodium **AND** polyethylene glycol **AND** bisacodyl **AND** bisacodyl    calcium carbonate    Calcium Replacement - Follow Nurse / BPA Driven Protocol    Magnesium Standard Dose Replacement - Follow Nurse / BPA Driven Protocol    melatonin    Morphine    ondansetron    oxyCODONE-acetaminophen    Phosphorus Replacement - Follow Nurse / BPA Driven Protocol    Potassium Replacement - Follow Nurse / BPA Driven Protocol    simethicone    sodium chloride    sodium chloride    sodium chloride    sodium chloride    sodium chloride    Review of Systems see HPI    Vital Signs:  Temp (24hrs), Av.5 °F (36.4 °C), Min:97.3 °F (36.3 °C), Max:97.7 °F (36.5 °C)    /90 (BP Location: Right arm, Patient Position: Lying)   Pulse 93   Temp 97.3 °F (36.3 °C) (Oral)   Resp 19   Ht 188 cm (74\")   Wt 77.9 kg (171 lb 12.8 oz)   SpO2 97%   " BMI 22.06 kg/m²     Physical Exam  Vital signs - reviewed.  Line/IV (left arm PICC) site - No erythema, warmth, induration, or tenderness.  Abdomen moderate distention  Slightly less tight in comparison to previous exam following initiation of his diuretic treatment  Mild tenderness without rebound  He appears to be breathing comfortably    Lab Results:  CBC:   Results from last 7 days   Lab Units 08/26/24  0332 08/23/24  1035 08/22/24  0921 08/21/24  0440 08/20/24  0501 08/19/24  1325   WBC 10*3/mm3 10.85* 10.34  --  8.21 10.87* 12.29*   HEMOGLOBIN g/dL 7.8* 7.8* 9.4* 7.4* 7.3* 8.0*   HEMATOCRIT % 28.2* 28.2* 34.6* 26.3* 25.5* 29.2*   PLATELETS 10*3/mm3 585* 572*  --  314 316 378     BMP:  Results from last 7 days   Lab Units 08/26/24  0332 08/25/24  0600 08/23/24  0357 08/21/24  0440 08/20/24  0501   SODIUM mmol/L 134* 133* 135* 135* 134*   POTASSIUM mmol/L 4.1 4.6 4.4 4.2 4.1   CHLORIDE mmol/L 104 105 108* 109* 106   CO2 mmol/L 18.0* 17.0* 19.0* 15.0* 17.0*   BUN mg/dL 11 10 8 8 8   CREATININE mg/dL 0.54* 0.59* 0.58* 0.63* 0.56*   GLUCOSE mg/dL 92 93 89 89 93   CALCIUM mg/dL 8.1* 9.0 7.8* 7.3* 7.8*   ALT (SGPT) U/L 12  --  14  --  17     Culture Results: No new results.  Prior pleural fluid cultures are grown Streptococcus mitis/oralis.  Right chest pleural fluid cultures dated August 16, 2024:  Body Fluid Culture   Lab   Streptococcus mitis / oralis Abnormal   [facility]                       Gram Stain  Lab   Many (4+) WBCs seen  [facility]      No organisms seen  [facility]              Susceptibility       Streptococcus mitis / oralis     JESS     Ceftriaxone <=0.12 ug/ml Susceptible     Penicillin G <=0.06 ug/ml Susceptible     Vancomycin 0.5 ug/ml Susceptible            Radiology:  CT chest on August 23, 2024:  IMPRESSION:  1. A pleural-based fluid and air collection in the posterior pleural  space on the right side is again noted. There is still small to moderate  fluid contained within the collection. There  is also a large air  collection contained. A right chest tube appears to be in good position.  2. Small left pleural effusion is new compared to the prior study.  3. There is patchy consolidation in the right lower lobe. There is right  lower lobe volume loss. There are new patchy infiltrates in the left  upper lobe and left lower lobe. Edema versus pneumonia.  4. Stable 3-4 mm left upper lobe nodule image 64 series 2. Follow-up CT  is recommended in 12 months per Fleischer Society guidelines in patients  who are at high risk. Optional CT recommended in patients that are not  high risk.  5. Macronodular liver consistent with liver cirrhosis. Moderate to large  ascites in the upper abdomen. Gallstones in the gallbladder.    CT scan abdomen and pelvis with contrast August 23, 2024:  IMPRESSION:  1. A pigtail catheter is in place within the patient's right posterior  pleural space. There is residual thickening of the visceral and parietal  pleura with a small amount of residual fluid and air within the cavity.  The pigtail catheter is in excellent position. A small left effusion is  present. There is bibasilar atelectasis as well as patchy airspace  disease in the lingular segment of the left upper lobe.  2. The liver is cirrhotic in morphology. There is ascitic fluid  throughout the abdomen and pelvis. The gallbladder is stone filled and  contracted.  3. The colon is normal in appearance. There is no evidence of mechanical  obstruction but there is abnormal thickening of several loops of jejunum  within the left mid abdomen. No discrete transition point or mechanical  obstruction. This is a nonspecific finding. There is no pneumatosis  demonstrated. No portal venous gas.  4. There is circumferential hypodensity surrounding the proximal segment  of the superior mesenteric artery. This extends from its origin for  approximately 3.5 cm along its course. There is associated luminal  stenosis of the proximal segment of the  SMA. This could represent  inflammatory change surrounding the proximal segment of the SMA or even  potentially a pseudoaneurysm of the proximal segment of this vessel. The  adjacent celiac artery is normal in appearance other than mild  atherosclerosis. This finding is present on the lowermost cut of the  previous CT of the chest dated 8/16/2024. The PRIMO is patent. There is  calcific plaquing and stenosis of the proximal segment of the right  renal artery.  5. Periumbilical hernia containing ascitic fluid. A bowel loop protrudes  slightly into the hernial sac without obstruction or incarceration. A  right inguinal her    Additional Studies Reviewed:  Pleural fluid cytology August 16, 2024:  Final Diagnosis   Pleural fluid, side not stated, ThinPrep preparation (1) and cell block:  A.  Marked acute inflammation with cellular degeneration.  B.  Negative for malignant cells.   Electronically signed by Natalie Chavez MD on 8/23/2024 at 1336       Impression:   1.  Loculated right pleural effusion/empyema-Streptococcus mitis/oralis.  Getting large bore chest tube placement today.  2.  Cirrhosis/ascites-still with distended abdomen.  Large amount of ascites on prior CT.  Some improvement with Lasix/spironolactone.  3.  History of renal cell cancer  4.  Tobacco use-have previously recommended cessation  5.  Weight loss-likely combination of his empyema and cirrhosis/ascites  6.  Anemia-stable    Recommendations:   Continue ceftriaxone  Large bore chest tube placement today  He has had his PICC line placed (left arm)  Would recommend diagnostic/therapeutic paracentesis with sample sent for cell count with differential, protein, albumin, culture, and cytology  He is continuing treatment with Lasix and spironolactone  Outpatient antibiotic recommendations/orders outlined below  Hopefully  can help make these arrangements through the outpatient area in [place] where he lives    Outpatient IV antibiotic  recommendations (please see progress note from August 23, 2024-he may need a physician who cares for him in the [facility] cosign his outpatient IV antibiotic orders-he indicates his primary care provider is through [facility] and has last name Omar):  1.  Diagnosis right chest empyema-Streptococcus mitis/oralis  2.  IV access-left arm PICC  3.  Thoracic surgeon-Presley Manzanares MD  4.  IV antibiotic recommendation:  Ceftriaxone 2 g IV daily through September 9, 2024 (3 weeks total from initiation)  5.  Laboratory monitoring:  CBC, CMP, CRP every Monday while on intravenous antibiotic therapy  6.  Follow-up appointment 2 weeks after hospital discharge    Mickey Zamora MD

## 2024-08-26 NOTE — CASE MANAGEMENT/SOCIAL WORK
Continued Stay Note  [facility]     Patient Name: Ravin Garza  MRN: [id number]  Today's Date: 8/26/2024    Admit Date: 8/15/2024        Discharge Plan       Row Name 08/26/24 1144       Plan    Plan Comments Pt currently in OR, [facility]  uable to accept pt referral due to staffing so need to find alternative agency. Spoke with Lisandro from [facility] [phone] and copay for pt would be 42.84 weekly. Will follow.                    Discharge Codes    No documentation.                 Expected Discharge Date and Time       Expected Discharge Date Expected Discharge Time    Aug 26, 2024               DARVIN HickeyW

## 2024-08-26 NOTE — PROGRESS NOTES
[facility]  INPATIENT PROGRESS NOTE    Patient Name: Ravin Garza  Date of Admission: 8/15/2024  Today's Date: 08/26/24  Length of Stay: 11  Primary Care Physician: Rk Nelson MD    Subjective   Chief Complaint: Empyema/abdomen pain  HPI   Blood pressure stable, afebrile.  Patient is on room air.  Patient just came back from chest tube placement and surgery.  Sodium stable.  Leukocytosis noted.  Hemoglobin stable.  Thrombocytosis .    Review of Systems   Constitutional:  Positive for activity change, appetite change and fatigue. Negative for chills and fever.   HENT:  Negative for hearing loss, nosebleeds, tinnitus and trouble swallowing.    Eyes:  Negative for visual disturbance.   Respiratory:  Negative for cough, chest tightness, shortness of breath and wheezing.    Cardiovascular:  Negative for chest pain, palpitations and leg swelling.   Gastrointestinal:  Negative for abdominal distention, abdominal pain, blood in stool, constipation, diarrhea, nausea and vomiting.   Endocrine: Negative for cold intolerance, heat intolerance, polydipsia, polyphagia and polyuria.   Genitourinary:  Negative for decreased urine volume, difficulty urinating, dysuria, flank pain, frequency and hematuria.   Musculoskeletal:  Negative for arthralgias, joint swelling and myalgias.   Skin:  Negative for rash.   Allergic/Immunologic: Negative for immunocompromised state.   Neurological:  Positive for weakness. Negative for dizziness, syncope, light-headedness and headaches.   Hematological:  Negative for adenopathy. Does not bruise/bleed easily.   Psychiatric/Behavioral:  Negative for confusion and sleep disturbance. The patient is not nervous/anxious.       All pertinent negatives and positives are as above. All other systems have been reviewed and are negative unless otherwise stated.     Objective    Temp:  [97.3 °F (36.3 °C)-97.7 °F (36.5 °C)] 97.4 °F (36.3 °C)  Heart  Rate:  [84-96] 93  Resp:  [16-19] 16  BP: (114-123)/(81-90) 122/82  Physical Exam  Vitals and nursing note reviewed.   Constitutional:       Comments: Cachectic .   HENT:      Head: Normocephalic.   Eyes:      Conjunctiva/sclera: Conjunctivae normal.      Pupils: Pupils are equal, round, and reactive to light.   Cardiovascular:      Rate and Rhythm: Normal rate and regular rhythm.      Heart sounds: Normal heart sounds.   Pulmonary:      Effort: Pulmonary effort is normal. No respiratory distress.      Breath sounds: Normal breath sounds.      Comments: Chest tube right side chest pain, crackle right lung base.  Patient is on room air.  Abdominal:      General: Bowel sounds are normal. There is no distension.      Palpations: Abdomen is soft.      Tenderness: There is no abdominal tenderness.   Musculoskeletal:         General: No swelling.      Cervical back: Neck supple.   Skin:     General: Skin is warm and dry.      Findings: No rash.   Neurological:      General: No focal deficit present.      Mental Status: He is alert and oriented to person, place, and time.      Motor: Weakness present.   Psychiatric:         Mood and Affect: Mood normal.         Behavior: Behavior normal.         Thought Content: Thought content normal.             Results Review:  I have reviewed the labs, radiology results, and diagnostic studies.    Laboratory Data:   Results from last 7 days   Lab Units 08/26/24  0332 08/23/24  1035 08/22/24  0921 08/21/24  0440   WBC 10*3/mm3 10.85* 10.34  --  8.21   HEMOGLOBIN g/dL 7.8* 7.8* 9.4* 7.4*   HEMATOCRIT % 28.2* 28.2* 34.6* 26.3*   PLATELETS 10*3/mm3 585* 572*  --  314        Results from last 7 days   Lab Units 08/26/24  0332 08/25/24  0600 08/23/24  0357 08/21/24  0440 08/20/24  0501   SODIUM mmol/L 134* 133* 135*   < > 134*   POTASSIUM mmol/L 4.1 4.6 4.4   < > 4.1   CHLORIDE mmol/L 104 105 108*   < > 106   CO2 mmol/L 18.0* 17.0* 19.0*   < > 17.0*   BUN mg/dL 11 10 8   < > 8   CREATININE  "mg/dL 0.54* 0.59* 0.58*   < > 0.56*   CALCIUM mg/dL 8.1* 9.0 7.8*   < > 7.8*   BILIRUBIN mg/dL 0.3  --  0.3  --  0.7   ALK PHOS U/L 154*  --  146*  --  146*   ALT (SGPT) U/L 12  --  14  --  17   AST (SGOT) U/L 21  --  26  --  30   GLUCOSE mg/dL 92 93 89   < > 93    < > = values in this interval not displayed.       Culture Data:   No results found for: \"BLOODCX\", \"URINECX\", \"WOUNDCX\", \"MRSACX\", \"RESPCX\", \"STOOLCX\"    Radiology Data:   Imaging Results (Last 24 Hours)       Procedure Component Value Units Date/Time    XR Chest 1 View [[id number]] Collected: 08/26/24 0712     Updated: 08/26/24 0716    Narrative:      EXAMINATION: XR CHEST 1 VW-     8/26/2024 2:15 AM     HISTORY: right pleural effusion; Z74.09-Other reduced mobility;  J86.9-Pyothorax without fistula     1 view chest x-ray.     COMPARISON:  Yesterday at 3:17 a.m.     Normal heart and mediastinum.     Fully expanded lungs.     Unchanged pigtail catheter within the lower RIGHT hemithorax.  Trace loculated fluid along the RIGHT lateral hemithorax.  Mild RIGHT basilar atelectasis.     No pneumothorax.     The LEFT lung remains clear.     The LEFT PICC line remains in good position with the tip in the atrium.       Impression:      1. No significant change.           This report was signed and finalized on 8/26/2024 7:13 AM by Dr. Mark Gibson MD.               I have reviewed the patient's current medications.     Assessment/Plan   Assessment  Active Hospital Problems    Diagnosis     **Empyema lung     Microcytic anemia     Hypomagnesemia     Weight loss     History of traumatic brain injury     Abnormal finding on imaging - outpatient ct abd w/ ? empyema     Hypercalcemia     Severe malnutrition     Generalized weakness        Treatment Plan    Empyema.  ID consult.  CT surgery consult.  Previously been on Zosyn.  Status post Rocephin x 5 days.  Leukocytosis noted.  CT scan of the chest- pigtail catheter is in place within the patient's right posterior  " pleural space- residual thickening of the visceral and parietal pleura with a small amount of residual fluid and air within the cavity, pigtail catheter is in excellent position-small left effusion is   Present-bibasilar atelectasis as well as patchy airspace disease in the lingular segment of the left upper lobe, liver is cirrhotic in morphology,  ascitic fluid  throughout the abdomen and pelvis, gallbladder is stone filled and  contracted, colon is normal in appearance, no evidence of mechanical ...   Status post tPA, pigtail catheter placed 8/19.  Plan for large bore chest tube today.  Plan for 2 weeks IV antibiotics outpatient, PICC placed.  Patient is on room air.    Abdomen pain/SMA versus pseudoaneurysm/gallstone/umbilicus hernia.  Vascular consult.  Repeat abd CT yesterday was abnormal with evidence of some liver disease and some ascites. There was a question of 3.5 cm segment of hypodensity around the SMA starting at origin. Pseudoaneurysm is in differential but vessel seems patent and it also could just be artifact in setting of ascites and abdominal fluid. Will get a CTA to better evaluate today and have vascular evaluate.   CT scan abdomen pelvic-pigtail catheter is in place within the patient's right posterior pleural space- residual thickening of the visceral and parietal pleura with a small amount of residual fluid and air within the cavity-  pigtail catheter is in excellent position. A small left effusion is  present. There is bibasilar atelectasis as well as patchy airspace  disease in the lingular segment of the left upper lobe, liver is cirrhotic in morphology- ascitic fluid throughout the abdomen and pelvis-gallbladder is stone filled and contracted, colon is normal in appearance- no evidence of mechanical obstruction but there is abnormal thickening of several loops of jejunum within the left mid abdomen- No discrete transition point or mechanical obstruction-nonspecific finding-no pneumatosis  demonstrated-No portal venous gas, circumferential hypodensity surrounding the proximal segment of the superior mesenteric artery- extends from its origin for  approximately 3.5 cm along its course-associated luminal  stenosis of the proximal segment of the SMA-could represent  inflammatory change surrounding the proximal segment of the SMA or even potentially a pseudoaneurysm of the proximal segment of this vessel- adjacent celiac artery is normal in appearance other than mild  Atherosclerosis- PRIMO is patent-calcific plaquing and stenosis of the proximal segment of the right renal artery, Periumbilical hernia containing ascitic fluid-bowel loop protrudes slightly into the hernial sac without obstruction or incarceration- right inguinal hernia is also present containing fat and some ascitic fluid.  CTA of the abdomen pending.    Cirrhosis/ascites.  Lasix.  Spironolactone.    Lung nodule . stable 3-4 mm left upper lobe nodule image 64 series 2. Follow-up CT is recommended in 12 months per Fleischer Society guidelines in patients ..., Discussed with patient.    Iron deficiency anemia -absolute iron deficiency on arrival.  Hemoccult was negative from outside facility.  Received 1 unit PRBC and hemoglobin has stayed up posttransfusion. Status post 3 days of IV iron and on oral.    History of TBI.      History of renal cancer with resection in the past-  No clear cancer malignancy seen on CTs to this point.       Hypercalcemia -resolved.      Hypokalemia -resolved.     Hypomagnesemia -resolved.     History of EtOH -patient states he stopped drinking about a month ago.  No signs of withdrawal here.  Will need PCP and GI follow-up for monitoring and care postdischarge.  Platelet counts are normal.  INR is normal.  Tolerating Aldactone started yesterday.  Will start some Lasix today.  Could potentially increase Aldactone further after.    Thrombocytosis    History of traumatic brain injury.    Reflux.  Pepcid.  Zofran as  needed.  Tums as needed    Insomnia.  Melatonin at night    Smoker.  Nicotine patch.    Malnourished.  Boost supplement    Deconditioning.  PT consult    Medical Decision Making  Number and Complexity of problems: Empyema/abdomen pain/anemia/hx renal cancer  Differential Diagnosis: None     Conditions and Status        Condition is unchanged.     MDM Data  External documents reviewed: None  Cardiac tracing (EKG, telemetry) interpretation: None  Radiology interpretation: CT scan  Labs reviewed: Laboratory  Any tests that were considered but not ordered: Laboratory in AM     Decision rules/scores evaluated (example [id number], Wells, etc): None     Discussed with: Patient     Care Planning  Shared decision making: Patient   Code status and discussions: DNR     Disposition  Social Determinants of Health that impact treatment or disposition: From home  2 to 5 days    Electronically signed by Matthias cMmahon MD, 08/26/24, 08:27 CDT.

## 2024-08-26 NOTE — PLAN OF CARE
Goal Outcome Evaluation:      Patient A/O x4 this shift. Patient treated for pain per MAR. Patient able to get some good sleep between roundings/care. CHG completed, xray and pre-op EKG completed. See I/O flowsheet as appropriate for CT mL output. Patient has questions pre-op for the provider re: medications being instilled prior to going to the O.R., post-op pain control/expectations and other teaching points outside nursing scope. Will update oncoming dayshift nurse at bedside shift report in the a.m. as appropriate.

## 2024-08-26 NOTE — ANESTHESIA PREPROCEDURE EVALUATION
Anesthesia Evaluation     Patient summary reviewed   no history of anesthetic complications:   NPO Solid Status: > 8 hours             Airway   Mallampati: II  Dental      Pulmonary    (+) pneumonia , a smoker,  (-) COPD, asthma, sleep apnea  Cardiovascular     (-) pacemaker, past MI, angina, cardiac stents      Neuro/Psych  (-) seizures, TIA, CVA  GI/Hepatic/Renal/Endo    (-) GERD, liver disease, no renal disease, diabetes    Musculoskeletal         ROS comment: Bilateral AKA  Abdominal    Substance History      OB/GYN          Other                      Anesthesia Plan    ASA 3     MAC     (Suspension of DNR d/w patient and he agrees to proceed.)  intravenous induction     Anesthetic plan, risks, benefits, and alternatives have been provided, discussed and informed consent has been obtained with: patient.    CODE STATUS:    Level Of Support Discussed With: Patient  Code Status (Patient has no pulse and is not breathing): No CPR (Do Not Attempt to Resuscitate)  Medical Interventions (Patient has pulse or is breathing): Full Support

## 2024-08-26 NOTE — CONSULTS
Ravin Garza  [id number]  [id number]  452/[birth year]  Matthias Mcmahon MD  8/15/2024    No chief complaint on file.      HPI: Ravin Garza is a 64 y.o. male who presented at an outside facility due to generalized weakness and not feeling well.  Apparently patient has had an issue with weight loss and a poor appetite for the last year but feels like he has more acutely lost weight over the last few months.  He denies any nausea or vomiting.  He states that he can take a couple of bites even if he starving and that will fill him up.  At the outside facility he was found to be anemic with a hemoglobin of 7.1 MCV of 72.  He denies any recent bleeding, no dark stools no vomiting of blood.  CT of the abdomen and pelvis showed potential cirrhosis and ascites but otherwise no acute intra-abdominal findings, however he appeared to have a right sided lung empyema.  He was transferred to [facility] for further workup.    Upon my assessment today he states he has lost about 20 pounds in the last couple of months.  He does complain of abdominal pain but it is all located in his lower abdomen.  He states it hurts when he eats, when he does not eat, it hurts all the time.  He mentioned that the hospitalist told him that they were not going to pull the fluid off they were going to use medication.  He was not happy about that because the fluid makes him miserable.  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  There is also circumferential hypodensity surrounding the proximal segment of the superior mesenteric artery.  We will obtain a another CTA A/P tomorrow for reevaluation.      Past Medical History:   Diagnosis Date    Amputee, above knee     bilateral    ETOH abuse        History reviewed. No pertinent surgical history.    History reviewed. No pertinent family history.    Social History     Socioeconomic History    Marital status: Legally    Tobacco Use    Smoking status: Every Day     Current  "packs/day: 1.00     Average packs/day: 1 pack/day for 45.0 years (45.0 ttl pk-yrs)     Types: Cigarettes     Start date: 8/16/1979    Smokeless tobacco: Never   Vaping Use    Vaping status: Never Used   Substance and Sexual Activity    Alcohol use: Not Currently    Drug use: Never    Sexual activity: Defer       No Known Allergies    Hospital Medications (active)         Dose Frequency Start End    acetaminophen (TYLENOL) 160 MG/5ML oral solution 650 mg 650 mg Every 4 Hours PRN 8/16/2024 --    Admin Instructions: If given for fever, use fever parameter: fever greater than 100.4 °F  Based on patient request - if ordered for moderate or severe pain, provider allows for administration of a medication prescribed for a lower pain scale.    Do not exceed 4 grams of acetaminophen in a 24 hr period. Max dose of 2gm for AST/ALT greater than 120 units/L.    If given for pain, use the following pain scale:   Mild Pain = Pain Score of 1-3, CPOT 1-2  Moderate Pain = Pain Score of 4-6, CPOT 3-4  Severe Pain = Pain Score of 7-10, CPOT 5-8    Route: Oral    Linked Group 1: Placed in \"Or\" Linked Group        acetaminophen (TYLENOL) suppository 650 mg 650 mg Every 4 Hours PRN 8/16/2024 --    Admin Instructions: If given for fever, use fever parameter: fever greater than 100.4 °F  Based on patient request - if ordered for moderate or severe pain, provider allows for administration of a medication prescribed for a lower pain scale.    Do not exceed 4 grams of acetaminophen in a 24 hr period. Max dose of 2gm for AST/ALT greater than 120 units/L.    If given for pain, use the following pain scale:   Mild Pain = Pain Score of 1-3, CPOT 1-2  Moderate Pain = Pain Score of 4-6, CPOT 3-4  Severe Pain = Pain Score of 7-10, CPOT 5-8    Route: Rectal    Linked Group 1: Placed in \"Or\" Linked Group        acetaminophen (TYLENOL) tablet 650 mg 650 mg Every 4 Hours PRN 8/16/2024 --    Admin Instructions: If given for fever, use fever parameter: " "fever greater than 100.4 °F  Based on patient request - if ordered for moderate or severe pain, provider allows for administration of a medication prescribed for a lower pain scale.    Do not exceed 4 grams of acetaminophen in a 24 hr period. Max dose of 2gm for AST/ALT greater than 120 units/L.    If given for pain, use the following pain scale:   Mild Pain = Pain Score of 1-3, CPOT 1-2  Moderate Pain = Pain Score of 4-6, CPOT 3-4  Severe Pain = Pain Score of 7-10, CPOT 5-8    Route: Oral    Linked Group 1: Placed in \"Or\" Linked Group        bisacodyl (DULCOLAX) EC tablet 5 mg 5 mg Daily PRN 8/16/2024 --    Admin Instructions: Use if no bowel movement after 12 hours.  Swallow whole. Do not crush, split, or chew tablet.    Route: Oral    Linked Group 2: Placed in \"And\" Linked Group        bisacodyl (DULCOLAX) suppository 10 mg 10 mg Daily PRN 8/16/2024 --    Admin Instructions: Use if no bowel movement after 12 hours.  Hold for diarrhea    Route: Rectal    Linked Group 2: Placed in \"And\" Linked Group        calcium carbonate (TUMS) chewable tablet 500 mg (200 mg elemental) 2 tablet 3 Times Daily PRN 8/17/2024 --    Admin Instructions: One tablet contains 200 mg elemental calcium.  Take with food.    Route: Oral    Calcium Replacement - Follow Nurse / BPA Driven Protocol  As Needed 8/16/2024 --    Admin Instructions: Open Order & Select \"BHS Electrolyte Replacement Protocol Algorithm\" to View Details    Route: Does not apply    cefTRIAXone (ROCEPHIN) 2,000 mg in sodium chloride 0.9 % 100 mL IVPB 2,000 mg Every 24 Hours 8/27/2024 9/10/2024    Admin Instructions: LR should be paused and flushing of the line with NS is recommended prior to and after completion of ceftriaxone infusion due to incompatibility. Do not co-adminster with calcium-containing solutions.  Caution: Look alike/sound alike drug alert    Route: Intravenous    famotidine (PEPCID) tablet 20 mg 20 mg 2 Times Daily Before Meals 8/18/2024 --    Route: " "Oral    ferrous sulfate tablet 325 mg 325 mg Daily With Breakfast 8/20/2024 --    Admin Instructions: Swallow whole. Do not crush, split, or chew. Take with food if GI upset occurs.    Route: Oral    furosemide (LASIX) tablet 20 mg 20 mg Daily 8/25/2024 --    Admin Instructions: Hold for SBP less than 100, DBP less than 60.    Route: Oral    lactated ringers infusion 9 mL/hr Continuous 8/26/2024 --    Route: Intravenous    Magnesium Standard Dose Replacement - Follow Nurse / BPA Driven Protocol  As Needed 8/16/2024 --    Admin Instructions: Open Order & Select \"BHS Electrolyte Replacement Protocol Algorithm\" to View Details    Route: Does not apply    melatonin tablet 5 mg 5 mg Nightly PRN 8/16/2024 --    Route: Oral    Morphine sulfate (PF) injection 2 mg 2 mg Every 4 Hours PRN 8/25/2024 --    Admin Instructions: Based on patient request - if ordered for moderate or severe pain, provider allows for administration of a medication prescribed for a lower pain scale.    Caution: Look alike/sound alike drug alert    If given for pain, use the following pain scale:  Mild Pain = Pain Score of 1-3, CPOT 1-2  Moderate Pain = Pain Score of 4-6, CPOT 3-4  Severe Pain = Pain Score of 7-10, CPOT 5-8    Route: Intravenous    nicotine (NICODERM CQ) 21 MG/24HR patch 1 patch 1 patch Every 24 Hours Scheduled 8/18/2024 --    Admin Instructions: Apply to clean, dry, nonhairy area of skin (typically upper arm or shoulder)  Dispose of nicotine replacement therapies and their wrappers in non-hazardous pharmaceutical waste or in regular trash.    Route: Transdermal    ondansetron (ZOFRAN) injection 4 mg 4 mg Every 6 Hours PRN 8/16/2024 --    Admin Instructions: If BOTH ondansetron (ZOFRAN) and promethazine (PHENERGAN) are ordered use ondansetron first and THEN promethazine IF ondansetron is ineffective.    Route: Intravenous    oxyCODONE-acetaminophen (PERCOCET) 5-325 MG per tablet 1 tablet 1 tablet Once As Needed 8/25/2024 --    Admin " "Instructions: Based on patient request - if ordered for moderate or severe pain, provider allows for administration of a medication prescribed for a lower pain scale.  [JASON]    Do not exceed 4 grams of acetaminophen in a 24 hr period. Max dose of 2gm for AST/ALT greater than 120 units/L        If given for pain, use the following pain scale:   Mild Pain = Pain Score of 1-3, CPOT 1-2  Moderate Pain = Pain Score of 4-6, CPOT 3-4  Severe Pain = Pain Score of 7-10, CPOT 5-8    Route: Oral    Phosphorus Replacement - Follow Nurse / BPA Driven Protocol  As Needed 8/16/2024 --    Admin Instructions: Open Order & Select \"BHS Electrolyte Replacement Protocol Algorithm\" to View Details    Route: Does not apply    polyethylene glycol (MIRALAX) packet 17 g 17 g Daily PRN 8/16/2024 --    Admin Instructions: Use if no bowel movement after 12 hours. Mix in 6-8 ounces of water.  Use 4-8 ounces of water, tea, or juice for each 17 gram dose.    Route: Oral    Linked Group 2: Placed in \"And\" Linked Group        Potassium Replacement - Follow Nurse / BPA Driven Protocol  As Needed 8/16/2024 --    Admin Instructions: Open Order & Select \"BHS Electrolyte Replacement Protocol Algorithm\" to View Details    Route: Does not apply    sennosides-docusate (PERICOLACE) 8.6-50 MG per tablet 2 tablet 2 tablet 2 Times Daily PRN 8/16/2024 --    Admin Instructions: Start bowel management regimen if patient has not had a bowel movement after 12 hours.    Route: Oral    Linked Group 2: Placed in \"And\" Linked Group        simethicone (MYLICON) chewable tablet 80 mg 80 mg 4 Times Daily PRN 8/22/2024 --    Route: Oral    sodium chloride 0.9 % flush 10 mL 10 mL Every 12 Hours Scheduled 8/24/2024 --    Route: Intravenous    sodium chloride 0.9 % flush 10 mL 10 mL As Needed 8/24/2024 --    Route: Intravenous    sodium chloride 0.9 % flush 20 mL 20 mL As Needed 8/24/2024 --    Route: Intravenous    sodium chloride 0.9 % infusion 40 mL 40 mL As Needed " "8/24/2024 --    Admin Instructions: Following administration of an IV intermittent medication, flush line with 40mL NS at 100mL/hr.    Route: Intravenous    spironolactone (ALDACTONE) tablet 25 mg 25 mg Daily 8/24/2024 --    Admin Instructions: Hold for SBP less than 100, DBP less than 60.  Group 1 (Yellow) Hazardous Drug - See Handling Guide    Route: Oral            Review of Systems   Constitutional:  Positive for appetite change and unexpected weight change. Negative for diaphoresis and fever.   HENT: Negative.     Eyes: Negative.    Respiratory: Negative.  Negative for shortness of breath and wheezing.    Cardiovascular: Negative.  Negative for chest pain and leg swelling.   Gastrointestinal:  Positive for abdominal distention and abdominal pain (Discomfort in lower abdomen). Negative for nausea and vomiting.   Endocrine: Negative.    Genitourinary: Negative.    Musculoskeletal: Negative.    Skin: Negative.    Allergic/Immunologic: Negative.    Neurological:  Positive for weakness. Negative for dizziness.   Hematological: Negative.    Psychiatric/Behavioral: Negative.         /83 (BP Location: Right arm, Patient Position: Lying)   Pulse 89   Temp 97.4 °F (36.3 °C) (Oral)   Resp 16   Ht 188 cm (74\")   Wt 77.9 kg (171 lb 12.8 oz)   SpO2 97%   BMI 22.06 kg/m²        Physical Exam  Vitals and nursing note reviewed.   Constitutional:       General: He is not in acute distress.     Appearance: Normal appearance. He is not diaphoretic.   HENT:      Head: Normocephalic. No right periorbital erythema or left periorbital erythema.      Nose: Nose normal.   Eyes:      General: No scleral icterus.     Pupils: Pupils are equal.   Cardiovascular:      Rate and Rhythm: Normal rate and regular rhythm.      Pulses: Normal pulses.      Heart sounds: Normal heart sounds. No murmur heard.  Pulmonary:      Effort: Pulmonary effort is normal. No respiratory distress.      Comments: Chest tube right side, crackle right " lung base, pain  Abdominal:      General: Bowel sounds are normal. There is distension.      Palpations: Abdomen is soft.      Tenderness: There is abdominal tenderness in the right lower quadrant and left lower quadrant. There is no guarding.   Musculoskeletal:         General: No swelling or tenderness. Normal range of motion.      Cervical back: Normal range of motion and neck supple.      Right lower leg: No edema.      Left lower leg: No edema.   Feet:      Right foot:      Skin integrity: Skin integrity normal.      Left foot:      Skin integrity: Skin integrity normal.   Skin:     General: Skin is warm and dry.      Findings: No erythema or rash.   Neurological:      General: No focal deficit present.      Mental Status: He is alert and oriented to person, place, and time. Mental status is at baseline.      Cranial Nerves: No cranial nerve deficit.      Motor: Weakness present.      Gait: Gait normal.   Psychiatric:         Attention and Perception: Attention normal.         Mood and Affect: Mood normal.         Behavior: Behavior normal.         Thought Content: Thought content normal.         Judgment: Judgment normal.         Laboratory Data:  Results from last 7 days   Lab Units 08/26/24  0332 08/23/24  1035 08/22/24  0921 08/21/24  0440   WBC 10*3/mm3 10.85* 10.34  --  8.21   HEMOGLOBIN g/dL 7.8* 7.8* 9.4* 7.4*   HEMATOCRIT % 28.2* 28.2* 34.6* 26.3*   PLATELETS 10*3/mm3 585* 572*  --  314       Results from last 7 days   Lab Units 08/26/24  0332 08/25/24  0600 08/23/24  0357 08/21/24  0440 08/20/24  0501   SODIUM mmol/L 134* 133* 135*   < > 134*   POTASSIUM mmol/L 4.1 4.6 4.4   < > 4.1   CHLORIDE mmol/L 104 105 108*   < > 106   CO2 mmol/L 18.0* 17.0* 19.0*   < > 17.0*   BUN mg/dL 11 10 8   < > 8   CREATININE mg/dL 0.54* 0.59* 0.58*   < > 0.56*   CALCIUM mg/dL 8.1* 9.0 7.8*   < > 7.8*   BILIRUBIN mg/dL 0.3  --  0.3  --  0.7   ALK PHOS U/L 154*  --  146*  --  146*   ALT (SGPT) U/L 12  --  14  --  17   AST  (SGOT) U/L 21  --  26  --  30   GLUCOSE mg/dL 92 93 89   < > 93    < > = values in this interval not displayed.     Results from last 7 days   Lab Units 08/26/24  0332   PROTIME Seconds 14.4   INR  1.08   APTT seconds 38.8*         Diagnostic Data:  Imaging Results (Last 24 Hours)       Procedure Component Value Units Date/Time    CT Outside Films [[id number]] Resulted: 08/26/24 1328     Updated: 08/26/24 1328    Narrative:      This procedure was auto-finalized with no dictation required.    XR Chest 1 View [[id number]] Collected: 08/26/24 1149     Updated: 08/26/24 1154    Narrative:      EXAM: XR CHEST 1 VW-      DATE: 8/26/2024 10:24 AM     HISTORY: s/p chest tube replacement; Z74.09-Other reduced mobility;  J86.9-Pyothorax without fistula       COMPARISON: 8/26/2024 at 3:22 a.m.     TECHNIQUE:  Frontal view(s) of the chest submitted.     FINDINGS:    The pigtail pleural drain has been removed on the right and a right  chest tube has been placed at the right lung base. Pleural-based opacity  laterally on the right is unchanged. There is no pneumothorax. The left  lung is grossly clear. Heart and mediastinum are unremarkable. There is  a left PICC tip at the right atrium.          Impression:         1. Removal of pigtail pleural drain and right chest tube placement with  stable right lateral pleural-based opacity likely loculated pleural  effusion/empyema. No pneumothorax.     This report was signed and finalized on 8/26/2024 11:51 AM by Sam Castaneda.       XR Chest 1 View [[id number]] Collected: 08/26/24 0712     Updated: 08/26/24 0716    Narrative:      EXAMINATION: XR CHEST 1 VW-     8/26/2024 2:15 AM     HISTORY: right pleural effusion; Z74.09-Other reduced mobility;  J86.9-Pyothorax without fistula     1 view chest x-ray.     COMPARISON:  Yesterday at 3:17 a.m.     Normal heart and mediastinum.     Fully expanded lungs.     Unchanged pigtail catheter within the lower RIGHT hemithorax.  Trace loculated  fluid along the RIGHT lateral hemithorax.  Mild RIGHT basilar atelectasis.     No pneumothorax.     The LEFT lung remains clear.     The LEFT PICC line remains in good position with the tip in the atrium.       Impression:      1. No significant change.           This report was signed and finalized on 8/26/2024 7:13 AM by Dr. Mark Gibson MD.               Impression:    Empyema lung    Generalized weakness    Microcytic anemia    Hypomagnesemia    Weight loss    History of traumatic brain injury    Abnormal finding on imaging - outpatient ct abd w/ ? empyema    Hypercalcemia    Severe malnutrition      Plan:   CTA abdomen/pelvis tomorrow  Will re-evaluate after testing  Continue supportive care    Thank you for allowing me to participate in the care of your patient.  Please do not hesitate to call with any questions or concerns.  Leah Zuleta, APRN   Vascular Surgery  [phone]

## 2024-08-26 NOTE — ANESTHESIA POSTPROCEDURE EVALUATION
"Patient: Ravin Garza    Procedure Summary       Date: 08/26/24 Room / Location: [facility] OR 16 /  [facility] OR    Anesthesia Start: 0957 Anesthesia Stop: 1023    Procedure: CHEST TUBE INSERTION (Right: Chest) Diagnosis:       Empyema lung      (Empyema lung [J86.9])    Surgeons: Presley Manzanares MD Provider: Brenda Aquino CRNA    Anesthesia Type: MAC ASA Status: 3            Anesthesia Type: MAC    Vitals  Vitals Value Taken Time   /83 08/26/24 1121   Temp 97.4 °F (36.3 °C) 08/26/24 1121   Pulse 89 08/26/24 1121   Resp 16 08/26/24 1121   SpO2 97 % 08/26/24 1121           Post Anesthesia Care and Evaluation    Patient location during evaluation: PACU  Patient participation: complete - patient participated  Level of consciousness: awake  Pain management: adequate    Airway patency: patent  Anesthetic complications: No anesthetic complications  PONV Status: none  Cardiovascular status: acceptable  Respiratory status: acceptable  Hydration status: acceptable    Comments: /83 (BP Location: Right arm, Patient Position: Lying)   Pulse 89   Temp 97.4 °F (36.3 °C) (Oral)   Resp 16   Ht 188 cm (74\")   Wt 77.9 kg (171 lb 12.8 oz)   SpO2 97%   BMI 22.06 kg/m²   Patient discharged from PACU based on Lincoln score. See RN notes for further details.    "

## 2024-08-26 NOTE — PLAN OF CARE
Goal Outcome Evaluation:   A&Ox4, on room air, resting in bed. New chest tube placed- right side- dsg CDI- draining appropriately. Pain management. LBM 8/26 AM. Hourly rounding. VSS. Safety maintained. Will continue to monitor until change of shift.

## 2024-08-27 ENCOUNTER — APPOINTMENT (OUTPATIENT)
Dept: GENERAL RADIOLOGY | Facility: HOSPITAL | Age: 64
End: 2024-08-27
Payer: MEDICARE

## 2024-08-27 LAB
ALBUMIN SERPL-MCNC: 3 G/DL (ref 3.5–5.2)
ALBUMIN/GLOB SERPL: 0.7 G/DL
ALP SERPL-CCNC: 180 U/L (ref 39–117)
ALT SERPL W P-5'-P-CCNC: 12 U/L (ref 1–41)
ANION GAP SERPL CALCULATED.3IONS-SCNC: 13 MMOL/L (ref 5–15)
AST SERPL-CCNC: 24 U/L (ref 1–40)
BASOPHILS # BLD AUTO: 0.11 10*3/MM3 (ref 0–0.2)
BASOPHILS NFR BLD AUTO: 1 % (ref 0–1.5)
BILIRUB SERPL-MCNC: 0.2 MG/DL (ref 0–1.2)
BUN SERPL-MCNC: 12 MG/DL (ref 8–23)
BUN/CREAT SERPL: 17.1 (ref 7–25)
CALCIUM SPEC-SCNC: 8.6 MG/DL (ref 8.6–10.5)
CHLORIDE SERPL-SCNC: 102 MMOL/L (ref 98–107)
CO2 SERPL-SCNC: 20 MMOL/L (ref 22–29)
CREAT SERPL-MCNC: 0.7 MG/DL (ref 0.76–1.27)
DEPRECATED RDW RBC AUTO: 86.4 FL (ref 37–54)
EGFRCR SERPLBLD CKD-EPI 2021: 102.3 ML/MIN/1.73
EOSINOPHIL # BLD AUTO: 0.17 10*3/MM3 (ref 0–0.4)
EOSINOPHIL NFR BLD AUTO: 1.5 % (ref 0.3–6.2)
ERYTHROCYTE [DISTWIDTH] IN BLOOD BY AUTOMATED COUNT: 30.1 % (ref 12.3–15.4)
GLOBULIN UR ELPH-MCNC: 4.3 GM/DL
GLUCOSE SERPL-MCNC: 77 MG/DL (ref 65–99)
HCT VFR BLD AUTO: 33.3 % (ref 37.5–51)
HGB BLD-MCNC: 9.1 G/DL (ref 13–17.7)
IMM GRANULOCYTES # BLD AUTO: 0.06 10*3/MM3 (ref 0–0.05)
IMM GRANULOCYTES NFR BLD AUTO: 0.5 % (ref 0–0.5)
LYMPHOCYTES # BLD AUTO: 1.09 10*3/MM3 (ref 0.7–3.1)
LYMPHOCYTES NFR BLD AUTO: 9.7 % (ref 19.6–45.3)
MCH RBC QN AUTO: 22.4 PG (ref 26.6–33)
MCHC RBC AUTO-ENTMCNC: 27.3 G/DL (ref 31.5–35.7)
MCV RBC AUTO: 82 FL (ref 79–97)
MONOCYTES # BLD AUTO: 0.59 10*3/MM3 (ref 0.1–0.9)
MONOCYTES NFR BLD AUTO: 5.2 % (ref 5–12)
NEUTROPHILS NFR BLD AUTO: 82.1 % (ref 42.7–76)
NEUTROPHILS NFR BLD AUTO: 9.23 10*3/MM3 (ref 1.7–7)
NRBC BLD AUTO-RTO: 0 /100 WBC (ref 0–0.2)
PLATELET # BLD AUTO: 674 10*3/MM3 (ref 140–450)
PMV BLD AUTO: 8 FL (ref 6–12)
POTASSIUM SERPL-SCNC: 3.7 MMOL/L (ref 3.5–5.2)
PROT SERPL-MCNC: 7.3 G/DL (ref 6–8.5)
RBC # BLD AUTO: 4.06 10*6/MM3 (ref 4.14–5.8)
SODIUM SERPL-SCNC: 135 MMOL/L (ref 136–145)
WBC NRBC COR # BLD AUTO: 11.25 10*3/MM3 (ref 3.4–10.8)

## 2024-08-27 PROCEDURE — 80053 COMPREHEN METABOLIC PANEL: CPT | Performed by: FAMILY MEDICINE

## 2024-08-27 PROCEDURE — 25010000002 MORPHINE SULFATE (PF) 2 MG/ML SOLUTION: Performed by: INTERNAL MEDICINE

## 2024-08-27 PROCEDURE — 25010000002 MORPHINE PER 10 MG: Performed by: INTERNAL MEDICINE

## 2024-08-27 PROCEDURE — 71045 X-RAY EXAM CHEST 1 VIEW: CPT

## 2024-08-27 PROCEDURE — 99232 SBSQ HOSP IP/OBS MODERATE 35: CPT | Performed by: INTERNAL MEDICINE

## 2024-08-27 PROCEDURE — 85025 COMPLETE CBC W/AUTO DIFF WBC: CPT | Performed by: FAMILY MEDICINE

## 2024-08-27 PROCEDURE — 99232 SBSQ HOSP IP/OBS MODERATE 35: CPT | Performed by: SURGERY

## 2024-08-27 PROCEDURE — 25010000002 CEFTRIAXONE PER 250 MG: Performed by: INTERNAL MEDICINE

## 2024-08-27 RX ORDER — HYDROCODONE BITARTRATE AND ACETAMINOPHEN 5; 325 MG/1; MG/1
1 TABLET ORAL EVERY 4 HOURS PRN
Status: CANCELLED | OUTPATIENT
Start: 2024-08-27 | End: 2024-09-01

## 2024-08-27 RX ORDER — OXYCODONE AND ACETAMINOPHEN 5; 325 MG/1; MG/1
1 TABLET ORAL EVERY 6 HOURS PRN
Status: COMPLETED | OUTPATIENT
Start: 2024-08-27 | End: 2024-08-27

## 2024-08-27 RX ORDER — MORPHINE SULFATE 2 MG/ML
2 INJECTION, SOLUTION INTRAMUSCULAR; INTRAVENOUS EVERY 4 HOURS PRN
Status: DISCONTINUED | OUTPATIENT
Start: 2024-08-27 | End: 2024-08-27

## 2024-08-27 RX ADMIN — FERROUS SULFATE TAB 325 MG (65 MG ELEMENTAL FE) 325 MG: 325 (65 FE) TAB at 08:13

## 2024-08-27 RX ADMIN — OXYCODONE HYDROCHLORIDE AND ACETAMINOPHEN 1 TABLET: 5; 325 TABLET ORAL at 11:01

## 2024-08-27 RX ADMIN — MORPHINE SULFATE 2 MG: 2 INJECTION, SOLUTION INTRAMUSCULAR; INTRAVENOUS at 05:02

## 2024-08-27 RX ADMIN — FAMOTIDINE 20 MG: 20 TABLET, FILM COATED ORAL at 08:13

## 2024-08-27 RX ADMIN — ANTACID TABLETS 2 TABLET: 500 TABLET, CHEWABLE ORAL at 00:53

## 2024-08-27 RX ADMIN — MORPHINE SULFATE 2 MG: 2 INJECTION, SOLUTION INTRAMUSCULAR; INTRAVENOUS at 09:37

## 2024-08-27 RX ADMIN — NICOTINE 1 PATCH: 21 PATCH, EXTENDED RELEASE TRANSDERMAL at 08:14

## 2024-08-27 RX ADMIN — ANTACID TABLETS 2 TABLET: 500 TABLET, CHEWABLE ORAL at 20:42

## 2024-08-27 RX ADMIN — SIMETHICONE 80 MG: 80 TABLET, CHEWABLE ORAL at 00:53

## 2024-08-27 RX ADMIN — MORPHINE SULFATE 2 MG: 2 INJECTION, SOLUTION INTRAMUSCULAR; INTRAVENOUS at 00:31

## 2024-08-27 RX ADMIN — SIMETHICONE 80 MG: 80 TABLET, CHEWABLE ORAL at 08:19

## 2024-08-27 RX ADMIN — Medication 10 ML: at 20:39

## 2024-08-27 RX ADMIN — FUROSEMIDE 20 MG: 20 TABLET ORAL at 08:13

## 2024-08-27 RX ADMIN — Medication 5 MG: at 20:38

## 2024-08-27 RX ADMIN — CEFTRIAXONE SODIUM 2000 MG: 2 INJECTION, POWDER, FOR SOLUTION INTRAMUSCULAR; INTRAVENOUS at 08:13

## 2024-08-27 RX ADMIN — OXYCODONE HYDROCHLORIDE AND ACETAMINOPHEN 1 TABLET: 5; 325 TABLET ORAL at 20:38

## 2024-08-27 RX ADMIN — Medication 10 ML: at 08:14

## 2024-08-27 RX ADMIN — SPIRONOLACTONE 25 MG: 25 TABLET ORAL at 08:13

## 2024-08-27 NOTE — PLAN OF CARE
Goal Outcome Evaluation:   Awaiting d/c plans- SW setting up outpatient IV antibx treatment- pending facility arrangements. Educated patient on plan & still wants to leave to go home. MD pena talked to patient at bedside to explain further. Patient does not care to wait until he can safely d/c & wants to sign AMA paperwork. Sister notified & disagreed with patient & would like him to stay until tomorrow. Awaiting sister to come to bedside to speak to patient w/ her to clarify concerns.

## 2024-08-27 NOTE — PROGRESS NOTES
"Patient name: Ravin Garza  Patient : 1960  VISIT # [id number]  MR #[id number]    Procedure: CT-guided right chest tube placement by radiology  Procedure Date: 2024      Subjective   Chief complaint: Weakness    On room air.  Underwent right large bore chest tube placement in OR yesterday by Dr. Manzanares.  Patient tolerated without remark.  Chest tube remains in place to 20 cm suction.    ROS: No fevers or chills.  Stable right side chest wall pain.  No shortness of breath       Objective     Visit Vitals  /78 (BP Location: Right arm, Patient Position: Lying)   Pulse 82   Temp 97.8 °F (36.6 °C) (Oral)   Resp 18   Ht 188 cm (74\")   Wt 77.9 kg (171 lb 12.8 oz)   SpO2 95%   BMI 22.06 kg/m²       Intake/Output Summary (Last 24 hours) at 2024 1018  Last data filed at 2024 0900  Gross per 24 hour   Intake 580 ml   Output 874 ml   Net -294 ml     Right chest tube:  24 mL / 24 hours, serosanguineous, no airleak    Lab:     CBC:  Results from last 7 days   Lab Units 24  0332 24  1035 24  0921 24  0440   WBC 10*3/mm3 10.85* 10.34  --  8.21   HEMATOCRIT % 28.2* 28.2* 34.6* 26.3*   PLATELETS 10*3/mm3 585* 572*  --  314          BMP:  Results from last 7 days   Lab Units 24  0332 24  0600 24  0357   SODIUM mmol/L 134* 133* 135*   POTASSIUM mmol/L 4.1 4.6 4.4   CHLORIDE mmol/L 104 105 108*   CO2 mmol/L 18.0* 17.0* 19.0*   GLUCOSE mg/dL 92 93 89   BUN mg/dL 11 10 8   CREATININE mg/dL 0.54* 0.59* 0.58*          COAG:  Results from last 7 days   Lab Units 24  0332   INR  1.08   APTT seconds 38.8*         IMAGES:       Imaging Results (Last 24 Hours)       Procedure Component Value Units Date/Time    XR Chest 1 View [[id number]] Collected: 24     Updated: 24    Narrative:      EXAMINATION: XR CHEST 1 VW-     2024 2:30 AM     HISTORY: right pleural effusion; Z74.09-Other reduced mobility;  J86.9-Pyothorax without fistula     A " frontal projection of the chest is compared with the previous study  dated 8/26/2024.     The lungs are poorly expanded.     There are coarse interstitial changes in the lungs bilaterally, left  more than the right, which appears moderately more progressive since the  previous study.     Right-sided chest tube is in place.     Loculated fluid is seen along the right lower lateral chest wall similar  to the previous study.     There is no pulmonary congestion or pneumothorax.     A left-sided PICC line is in place. No change.     The heart size is in the normal range. Atheromatous change of thoracic  aorta noted.     There is no acute bony abnormality. Hardware fusion of cervical spine is  visualized and is similar to the previous study.       Impression:      1. Moderately progressive coarse interstitial changes, left more than  the right, may partly be due to loss of lung volume which is worse than  the previous study. However superimposed acute inflammatory/infectious  process is not excluded.  2. Right chest tube in place. A small loculated fluid along the right  lower lateral chest wall persist.     This report was signed and finalized on 8/27/2024 7:10 AM by Dr. Veronika Gutierrez MD.       CT Chest Without Contrast Diagnostic [[id number]] Collected: 08/26/24 1724     Updated: 08/26/24 1735    Narrative:      EXAM/TECHNIQUE: CT chest without contrast     INDICATION: empyema; Z74.09-Other reduced mobility; J86.9-Pyothorax  without fistula     COMPARISON: 8/23/2024     DLP: 604.63 mGy.cm. Automated exposure control was also utilized to  decrease patient radiation dose.     FINDINGS:     Right basilar pigtail pleural drain has been removed and a large caliber  right-sided chest tube has been placed. Right-sided chest tube  terminates in the medial right posterior mid chest. Of note, the tube  indents the right lower lobe and there is an area of kinking along the  chest wall soft tissues. There is a persistent small  to moderate  right-sided hydropneumothorax present with surrounding pleural  thickening and subpleural atelectasis.      No change in subpleural opacities in the right posterior upper and lower  lobes, including a 4.0 x 2.4 cm masslike cavitary lesion on image 115 of  series 3.     A small left-sided pleural effusion is present. There is some patchy  groundglass opacity throughout the left upper and left lower lobe. No  area of consolidation. No left-sided pneumothorax. The central airways  are clear other than for a few scattered areas of endobronchial  mucus/secretions. Mild emphysema.     No enlarged thoracic lymph nodes. Main pulmonary artery is nondilated.  The ascending aorta is ectatic measuring up to 4.1 cm diameter. Heavy  coronary artery calcification. No pericardial effusion.     No large thyroid nodule. Left-sided PICC with tip in the proximal right  atrium. Findings in the included upper abdomen are described in a  separate report.       Impression:         1.  Interval exchange of right basilar pigtail pleural drain for large  caliber right chest tube. Chest tube tip indents the right lower lobe  (intraparenchymal course is not excluded). A small to moderate  right-sided hydropneumothorax persists. Also of note, there is kinking  of the chest tube within the chest wall soft tissues. No change in  subpleural opacities in the right posterior upper and lower lobes  including a 4.0 x 2.4 cm masslike cavitary lesion on image 115 of series  3.     2.  Small left-sided pleural effusion. Patchy groundglass opacity  throughout the left lung suspicious for multifocal infectious process.     This report was signed and finalized on 8/26/2024 5:32 PM by Dr. Lamont Gonzalez MD.       CT Angiogram Abdomen Pelvis [[id number]] Collected: 08/26/24 1652     Updated: 08/26/24 1727    Narrative:      EXAM: CT ANGIOGRAM ABDOMEN PELVIS- - 8/26/2024 2:37 PM     HISTORY: f/u SMA abnormality on ct 8/23; Z74.09-Other reduced  mobility;  J86.9-Pyothorax without fistula       COMPARISON: 8/23/2024.     DOSE LENGTH PRODUCT: 604.63 mGy.cm  Automatic exposure control was  utilized to make radiation dose as low as reasonably achievable.     TECHNIQUE: Enhanced  CT images of the abdomen and pelvis obtained with  multiplanar reformats. 3D postprocessing, including MIPs, performed and  images saved to PACS.     FINDINGS:  VISUALIZED CHEST: Interval exchange or replacement of RIGHT chest tube.  Hydropneumothorax at the RIGHT lung base. There is a RIGHT lower lobe  air-fluid level and consolidation measuring 2.4 x 4.5 cm on axial series  3, image 30, which appears similar size, but with increased air compared  to 8/23/2024. This could represent pulmonary abscess.     LEFT pleural fluid. No pericardial effusion. Normal inferior heart size  with scattered coronary artery calcifications.     Arterial phase of imaging limits solid organ evaluation.     LIVER: Nodular shrunken liver. Portal vein not optimally evaluated on  this exam. Hepatic veins not opacified.     BILIARY: Gallstones. Gallbladder not distended. No bile duct dilation.     PANCREAS: Normal pancreas contour and enhancement.     SPLEEN: Normal size and contour. Small adjacent splenule.     ADRENAL: Normal appearance of the bilateral adrenal glands.     GENITOURINARY:  No hydronephrosis, urolithiasis or solid renal lesion. Small densities  adjacent to the LEFT kidney, favor postoperative changes.  Urinary bladder collapsed, which limits evaluation of the bladder wall.   Diminutive or absent prostate.     PERITONEUM: Moderate to large free fluid. No free air.     GI TRACT: Normal configuration of the stomach and duodenum.  No  abnormally dilated loops of bowel or bowel wall thickening. Normal  gas-filled appendix on axial series 3, images 160-155.     VESSELS: Aorta normal in course and caliber with calcified  atherosclerosis. Celiac, conventional hepatic, splenic arteries patent  and  normal caliber. Superior mesenteric artery patent with mild stenosis  as it passes through soft tissue density on axial series 3, images  92-97.      Bilateral single renal and inferior mesenteric arteries patent and  normal caliber. Bilateral common iliac, internal iliac, external iliac,  common femoral, partially visualized superficial and deep femoral  arteries with multifocal mild to moderate stenosis due to  atherosclerosis.     RETROPERITONEUM: There is soft tissue density measuring 2.3 x 2.7 cm on  axial series 3, image 89, which encases the superior mesenteric artery  and abuts/partially encases the celiac artery. This appears to track  down toward or from the posterior aspect of the pancreas, difficult to  discretely delineate borders at the level of pancreas on this exam.  There is loss of fat plane between the posterior aspect of the pancreas  and the abnormal retroperitoneal soft tissue.     SOFT TISSUES: Body wall edema. Fat-containing umbilical hernia. Fat and  fluid containing RIGHT inguinal hernia. Small subcutaneous gas along the  chest tube tract.     BONES: No acute or aggressive bony lesion. Old healed RIGHT rib  fracture. No acute or suspicious bony finding. Degenerative changes in  the visualized spine.          Impression:      1. RIGHT chest tube with pleural gas and fluid. RIGHT lower lobe  consolidation with air-fluid level. Size overall similar compared to  8/3/2024 with increased air-fluid component. In the setting of pneumonia  and empyema, pulmonary abscess is a consideration.     2. Superior mesenteric artery mildly narrowed as it passes through a  retroperitoneal soft tissue density. Soft tissue abuts and incompletely  encases the celiac artery, which is patent and normal caliber. Similar  appearance to 8/23/2024. Differential considerations include pancreatic  malignancy, lymphoma, retroperitoneal fibrosis, or metastatic disease.  Patient has evidence of prior LEFT renal surgery.  Care Everywhere  indicates pathology from [facility], but actual pathology report is not  available for review.     3. Findings of cirrhosis with moderate to large ascites. Body wall  edema. Phase of contrast limits evaluation.     4. Gallstones.     This report was signed and finalized on 8/26/2024 5:24 PM by Dr Kelly Delong MD.       CT Outside Films [[id number]] Resulted: 08/26/24 1328     Updated: 08/26/24 1328    Narrative:      This procedure was auto-finalized with no dictation required.    XR Chest 1 View [[id number]] Collected: 08/26/24 1149     Updated: 08/26/24 1154    Narrative:      EXAM: XR CHEST 1 VW-      DATE: 8/26/2024 10:24 AM     HISTORY: s/p chest tube replacement; Z74.09-Other reduced mobility;  J86.9-Pyothorax without fistula       COMPARISON: 8/26/2024 at 3:22 a.m.     TECHNIQUE:  Frontal view(s) of the chest submitted.     FINDINGS:    The pigtail pleural drain has been removed on the right and a right  chest tube has been placed at the right lung base. Pleural-based opacity  laterally on the right is unchanged. There is no pneumothorax. The left  lung is grossly clear. Heart and mediastinum are unremarkable. There is  a left PICC tip at the right atrium.          Impression:         1. Removal of pigtail pleural drain and right chest tube placement with  stable right lateral pleural-based opacity likely loculated pleural  effusion/empyema. No pneumothorax.     This report was signed and finalized on 8/26/2024 11:51 AM by Sam Castaneda.             CXR: Right large bore chest tube in stable position.  Ongoing right loculated effusion.  No pneumothorax.    Physical Exam:  General: Alert, oriented. No apparent distress.  Chronically ill-appearing  Cardiovascular: Regular rate and rhythm without murmur, rubs, or gallops.    Pulmonary: Clear to auscultation bilaterally without wheezing, rubs, or rales.  Chest: Right chest tube in place to -20 cm suction, no airleak, fluid is  serosanguineous.  Abdomen: Soft, nondistended, and nontender.  Extremities: Warm, moves all extremities. No edema.   Neurologic:  Grossly intact with no focal deficits.          Impression:  Right pleural effusion  Generalized weakness  Weight loss    Plan:  Right chest tube pulled back per Dr. Manzanares, now at 9 cm at the skin Transitioned to Heimlich valve, teaching provided to family.  Continue protein supplements with each meal  Antibiotic management per infectious disease  Okay for discharge home from CT Surgery standpoint when okay with others  He will follow-up with me in clinic in 2 weeks with repeat CT chest with contrast  Discussed with patient, nursing, and patient's family.      Hilary Doan, APRN  08/27/24  10:18 CDT

## 2024-08-27 NOTE — CASE MANAGEMENT/SOCIAL WORK
Continued Stay Note  [facility]     Patient Name: Ravin Garza  MRN: [id number]  Today's Date: 8/27/2024    Admit Date: 8/15/2024        Discharge Plan       Row Name 08/27/24 1516       Plan    Final Discharge Disposition Code 07 - left AMA    Final Note PT insists on leaving AMA today. His IV ABX set up at [facility] has not yet been confirmed. PT is aware and states that he will follow up with his PCP to see if they have sent the orders to [facility].      Row Name 08/27/24 1410       Plan    Plan Comments PT requesting to finish IV Abx as an outpatient at [facility]. SW has contacted PT's PCP office to request orders to be sent to [facility]. SW is awaiting return call to verify this has been completed.    Final Discharge Disposition Code 01 - home or self-care                   Discharge Codes    No documentation.                 Expected Discharge Date and Time       Expected Discharge Date Expected Discharge Time    Aug 26, 2024               XIMENA Raza

## 2024-08-27 NOTE — PROGRESS NOTES
Infectious Diseases Progress Note    Patient:  Ravin Garza  YOB: 1960  MRN: [id number]   Admit date: 8/15/2024   Admitting Physician: Matthias Mcmahon MD  Primary Care Physician: Rk Nelson MD    Chief Complaint/Interval History: He is without new symptoms.  He is comfortable at present.  Tolerating antibiotic treatment.  His other sister is at bedside today.  She indicates they could assist him in transporting to hospital in [place] if he needs a daily dose of antibiotics.  It sounds like he might have some assistance in addition to home health at home periodically as well.  No productive cough or sputum production.  Chest tube exchange yesterday.  Operative note reviewed.    Intake/Output Summary (Last 24 hours) at 2024 0945  Last data filed at 2024 0900  Gross per 24 hour   Intake 680 ml   Output 874 ml   Net -194 ml     Allergies: No Known Allergies  Current Scheduled Medications:   cefTRIAXone, 2,000 mg, Intravenous, Q24H  famotidine, 20 mg, Oral, BID AC  ferrous sulfate, 325 mg, Oral, Daily With Breakfast  furosemide, 20 mg, Oral, Daily  nicotine, 1 patch, Transdermal, Q24H  sodium chloride, 10 mL, Intravenous, Q12H  spironolactone, 25 mg, Oral, Daily      lactated ringers, 9 mL/hr, Last Rate: 9 mL/hr (24 0957)       Current PRN Medications:    acetaminophen **OR** acetaminophen **OR** acetaminophen    senna-docusate sodium **AND** polyethylene glycol **AND** bisacodyl **AND** bisacodyl    calcium carbonate    Calcium Replacement - Follow Nurse / BPA Driven Protocol    Magnesium Standard Dose Replacement - Follow Nurse / BPA Driven Protocol    melatonin    Morphine    ondansetron    oxyCODONE-acetaminophen    Phosphorus Replacement - Follow Nurse / BPA Driven Protocol    Potassium Replacement - Follow Nurse / BPA Driven Protocol    simethicone    sodium chloride    sodium chloride    sodium chloride    Review of Systems see HPI    Vital Signs:  Temp (24hrs), Av.7 °F  "(36.5 °C), Min:97.3 °F (36.3 °C), Max:98.2 °F (36.8 °C)    /78 (BP Location: Right arm, Patient Position: Lying)   Pulse 82   Temp 97.8 °F (36.6 °C) (Oral)   Resp 18   Ht 188 cm (74\")   Wt 77.9 kg (171 lb 12.8 oz)   SpO2 95%   BMI 22.06 kg/m²     Physical Exam  Vital signs - reviewed.  Line/IV (left arm PICC) site - No erythema, warmth, induration, or tenderness.  Abdomen exam the same  Skin without rash    Lab Results:  CBC:   Results from last 7 days   Lab Units 08/26/24  0332 08/23/24  1035 08/22/24  0921 08/21/24  0440   WBC 10*3/mm3 10.85* 10.34  --  8.21   HEMOGLOBIN g/dL 7.8* 7.8* 9.4* 7.4*   HEMATOCRIT % 28.2* 28.2* 34.6* 26.3*   PLATELETS 10*3/mm3 585* 572*  --  314     BMP:  Results from last 7 days   Lab Units 08/26/24  0332 08/25/24  0600 08/23/24  0357 08/21/24  0440   SODIUM mmol/L 134* 133* 135* 135*   POTASSIUM mmol/L 4.1 4.6 4.4 4.2   CHLORIDE mmol/L 104 105 108* 109*   CO2 mmol/L 18.0* 17.0* 19.0* 15.0*   BUN mg/dL 11 10 8 8   CREATININE mg/dL 0.54* 0.59* 0.58* 0.63*   GLUCOSE mg/dL 92 93 89 89   CALCIUM mg/dL 8.1* 9.0 7.8* 7.3*   ALT (SGPT) U/L 12  --  14  --      Culture Results: No new results  Radiology: None  Additional Studies Reviewed: None    Impression:   1.  Loculated right pleural effusion/empyema-had large bore chest tube placed.  2.  Cirrhosis/ascites-exam stable.  Slight improvement with diuretic treatment.  Would recommend 1 diagnostic/therapeutic paracentesis.  Hopefully he could then be maintained with diuretic treatment.  3.  History of renal cell cancer  4.  Tobacco use-recommend cessation  5.  Weight loss-likely combination of empyema and cirrhosis/ascites  6.  Anemia    Recommendations:   Continue ceftriaxone  No change in antibiotic orders  He is tolerating ceftriaxone without antibiotic or PICC line related problem  It sounds like he may have some assistance at home with either home health plus family  He could not manage IV antibiotic treatment himself at home.  " He would need somebody to assist with that infusion daily if he goes home.  Orders outlined below could be outpatient or home if he has adequate help at home  Okay with me for discharge when ready for release per others and antibiotic arrangements in place    Outpatient IV antibiotic recommendations (please see progress note from August 23, 2024-he may need a physician who cares for him in the [facility] cosign his outpatient IV antibiotic orders-he indicates his primary care provider is through [facility] and has last name Omar):  1.  Diagnosis right chest empyema-Streptococcus mitis/oralis  2.  IV access-left arm PICC  3.  Thoracic surgeon-Presley Manzanares MD  4.  IV antibiotic recommendation:  Ceftriaxone 2 g IV daily through September 9, 2024 (3 weeks total from initiation)  5.  Laboratory monitoring:  CBC, CMP, CRP every Monday while on intravenous antibiotic therapy  6.  Follow-up appointment 2 weeks after hospital discharge    Mickey Zamora MD

## 2024-08-27 NOTE — PLAN OF CARE
Goal Outcome Evaluation:    Problem: Adult Inpatient Plan of Care  Goal: Plan of Care Review  Outcome: Ongoing, Not Progressing  Flowsheets (Taken 8/27/2024 3880)  Progress: no change  Plan of Care Reviewed With: patient  Outcome Evaluation: PRN morphine given for abdominal pain with little relief. Patient reports a constant pressure in his abdomen. Chest tube with minimal output this shift. VSS. Patient is not on tele at this time.

## 2024-08-27 NOTE — PROGRESS NOTES
[facility] Medicine Services  INPATIENT PROGRESS NOTE    Patient Name: Ravin Garza  Date of Admission: 8/15/2024  Today's Date: 08/27/24  Length of Stay: 12  Primary Care Physician: Rk Nelson MD    Subjective   Chief Complaint: Empyema  HPI   Blood pressure stable afebrile.  Patient is on room air.  Sodium stable.  Slight increase in leukocytosis.  Hemoglobin increased.    Review of Systems   Constitutional:  Positive for activity change, appetite change and fatigue. Negative for chills and fever.   HENT:  Negative for hearing loss, nosebleeds, tinnitus and trouble swallowing.    Eyes:  Negative for visual disturbance.   Respiratory:  Negative for cough, chest tightness, shortness of breath and wheezing.    Cardiovascular:  Negative for chest pain, palpitations and leg swelling.   Gastrointestinal:  Negative for abdominal distention, abdominal pain, blood in stool, constipation, diarrhea, nausea and vomiting.   Endocrine: Negative for cold intolerance, heat intolerance, polydipsia, polyphagia and polyuria.   Genitourinary:  Negative for decreased urine volume, difficulty urinating, dysuria, flank pain, frequency and hematuria.   Musculoskeletal:  Negative for arthralgias, joint swelling and myalgias.   Skin:  Negative for rash.   Allergic/Immunologic: Negative for immunocompromised state.   Neurological:  Positive for weakness. Negative for dizziness, syncope, light-headedness and headaches.   Hematological:  Negative for adenopathy. Does not bruise/bleed easily.   Psychiatric/Behavioral:  Negative for confusion and sleep disturbance. The patient is not nervous/anxious.    All pertinent negatives and positives are as above. All other systems have been reviewed and are negative unless otherwise stated.     Objective    Temp:  [97.4 °F (36.3 °C)-98.2 °F (36.8 °C)] 97.8 °F (36.6 °C)  Heart Rate:  [82-92] 82  Resp:  [16-19] 18  BP: ()/(74-87) 108/78  Physical  Exam  Vitals and nursing note reviewed.   Constitutional:       Comments: Cachectic .   HENT:      Head: Normocephalic.   Eyes:      Conjunctiva/sclera: Conjunctivae normal.      Pupils: Pupils are equal, round, and reactive to light.   Cardiovascular:      Rate and Rhythm: Normal rate and regular rhythm.      Heart sounds: Normal heart sounds.   Pulmonary:      Effort: Pulmonary effort is normal. No respiratory distress.      Breath sounds: Normal breath sounds.      Comments: Chest tube right side chest pain, diminish breath sound bilateral, clear.  Patient is on room air.  Abdominal:      General: Bowel sounds are normal. There is no distension.      Palpations: Abdomen is soft.      Tenderness: There is no abdominal tenderness.   Musculoskeletal:         General: No swelling.      Cervical back: Neck supple.   Skin:     General: Skin is warm and dry.      Findings: No rash.   Neurological:      General: No focal deficit present.      Mental Status: He is alert and oriented to person, place, and time.      Motor: Weakness present.   Psychiatric:         Mood and Affect: Mood normal.         Behavior: Behavior normal.         Thought Content: Thought content normal.             Results Review:  I have reviewed the labs, radiology results, and diagnostic studies.    Laboratory Data:   Results from last 7 days   Lab Units 08/26/24  0332 08/23/24  1035 08/22/24  0921 08/21/24  0440   WBC 10*3/mm3 10.85* 10.34  --  8.21   HEMOGLOBIN g/dL 7.8* 7.8* 9.4* 7.4*   HEMATOCRIT % 28.2* 28.2* 34.6* 26.3*   PLATELETS 10*3/mm3 585* 572*  --  314        Results from last 7 days   Lab Units 08/26/24  0332 08/25/24  0600 08/23/24  0357   SODIUM mmol/L 134* 133* 135*   POTASSIUM mmol/L 4.1 4.6 4.4   CHLORIDE mmol/L 104 105 108*   CO2 mmol/L 18.0* 17.0* 19.0*   BUN mg/dL 11 10 8   CREATININE mg/dL 0.54* 0.59* 0.58*   CALCIUM mg/dL 8.1* 9.0 7.8*   BILIRUBIN mg/dL 0.3  --  0.3   ALK PHOS U/L 154*  --  146*   ALT (SGPT) U/L 12  --  14  "  AST (SGOT) U/L 21  --  26   GLUCOSE mg/dL 92 93 89       Culture Data:   No results found for: \"BLOODCX\", \"URINECX\", \"WOUNDCX\", \"MRSACX\", \"RESPCX\", \"STOOLCX\"    Radiology Data:   Imaging Results (Last 24 Hours)       Procedure Component Value Units Date/Time    XR Chest 1 View [[id number]] Collected: 08/27/24 0707     Updated: 08/27/24 0713    Narrative:      EXAMINATION: XR CHEST 1 VW-     8/27/2024 2:30 AM     HISTORY: right pleural effusion; Z74.09-Other reduced mobility;  J86.9-Pyothorax without fistula     A frontal projection of the chest is compared with the previous study  dated 8/26/2024.     The lungs are poorly expanded.     There are coarse interstitial changes in the lungs bilaterally, left  more than the right, which appears moderately more progressive since the  previous study.     Right-sided chest tube is in place.     Loculated fluid is seen along the right lower lateral chest wall similar  to the previous study.     There is no pulmonary congestion or pneumothorax.     A left-sided PICC line is in place. No change.     The heart size is in the normal range. Atheromatous change of thoracic  aorta noted.     There is no acute bony abnormality. Hardware fusion of cervical spine is  visualized and is similar to the previous study.       Impression:      1. Moderately progressive coarse interstitial changes, left more than  the right, may partly be due to loss of lung volume which is worse than  the previous study. However superimposed acute inflammatory/infectious  process is not excluded.  2. Right chest tube in place. A small loculated fluid along the right  lower lateral chest wall persist.     This report was signed and finalized on 8/27/2024 7:10 AM by Dr. Veronika Gutierrez MD.       CT Chest Without Contrast Diagnostic [[id number]] Collected: 08/26/24 1724     Updated: 08/26/24 1735    Narrative:      EXAM/TECHNIQUE: CT chest without contrast     INDICATION: empyema; Z74.09-Other reduced " mobility; J86.9-Pyothorax  without fistula     COMPARISON: 8/23/2024     DLP: 604.63 mGy.cm. Automated exposure control was also utilized to  decrease patient radiation dose.     FINDINGS:     Right basilar pigtail pleural drain has been removed and a large caliber  right-sided chest tube has been placed. Right-sided chest tube  terminates in the medial right posterior mid chest. Of note, the tube  indents the right lower lobe and there is an area of kinking along the  chest wall soft tissues. There is a persistent small to moderate  right-sided hydropneumothorax present with surrounding pleural  thickening and subpleural atelectasis.      No change in subpleural opacities in the right posterior upper and lower  lobes, including a 4.0 x 2.4 cm masslike cavitary lesion on image 115 of  series 3.     A small left-sided pleural effusion is present. There is some patchy  groundglass opacity throughout the left upper and left lower lobe. No  area of consolidation. No left-sided pneumothorax. The central airways  are clear other than for a few scattered areas of endobronchial  mucus/secretions. Mild emphysema.     No enlarged thoracic lymph nodes. Main pulmonary artery is nondilated.  The ascending aorta is ectatic measuring up to 4.1 cm diameter. Heavy  coronary artery calcification. No pericardial effusion.     No large thyroid nodule. Left-sided PICC with tip in the proximal right  atrium. Findings in the included upper abdomen are described in a  separate report.       Impression:         1.  Interval exchange of right basilar pigtail pleural drain for large  caliber right chest tube. Chest tube tip indents the right lower lobe  (intraparenchymal course is not excluded). A small to moderate  right-sided hydropneumothorax persists. Also of note, there is kinking  of the chest tube within the chest wall soft tissues. No change in  subpleural opacities in the right posterior upper and lower lobes  including a 4.0 x 2.4  cm masslike cavitary lesion on image 115 of series  3.     2.  Small left-sided pleural effusion. Patchy groundglass opacity  throughout the left lung suspicious for multifocal infectious process.     This report was signed and finalized on 8/26/2024 5:32 PM by Dr. Lamont Gonzalez MD.       CT Angiogram Abdomen Pelvis [[id number]] Collected: 08/26/24 1652     Updated: 08/26/24 1727    Narrative:      EXAM: CT ANGIOGRAM ABDOMEN PELVIS- - 8/26/2024 2:37 PM     HISTORY: f/u SMA abnormality on ct 8/23; Z74.09-Other reduced mobility;  J86.9-Pyothorax without fistula       COMPARISON: 8/23/2024.     DOSE LENGTH PRODUCT: 604.63 mGy.cm  Automatic exposure control was  utilized to make radiation dose as low as reasonably achievable.     TECHNIQUE: Enhanced  CT images of the abdomen and pelvis obtained with  multiplanar reformats. 3D postprocessing, including MIPs, performed and  images saved to PACS.     FINDINGS:  VISUALIZED CHEST: Interval exchange or replacement of RIGHT chest tube.  Hydropneumothorax at the RIGHT lung base. There is a RIGHT lower lobe  air-fluid level and consolidation measuring 2.4 x 4.5 cm on axial series  3, image 30, which appears similar size, but with increased air compared  to 8/23/2024. This could represent pulmonary abscess.     LEFT pleural fluid. No pericardial effusion. Normal inferior heart size  with scattered coronary artery calcifications.     Arterial phase of imaging limits solid organ evaluation.     LIVER: Nodular shrunken liver. Portal vein not optimally evaluated on  this exam. Hepatic veins not opacified.     BILIARY: Gallstones. Gallbladder not distended. No bile duct dilation.     PANCREAS: Normal pancreas contour and enhancement.     SPLEEN: Normal size and contour. Small adjacent splenule.     ADRENAL: Normal appearance of the bilateral adrenal glands.     GENITOURINARY:  No hydronephrosis, urolithiasis or solid renal lesion. Small densities  adjacent to the LEFT kidney, favor  postoperative changes.  Urinary bladder collapsed, which limits evaluation of the bladder wall.   Diminutive or absent prostate.     PERITONEUM: Moderate to large free fluid. No free air.     GI TRACT: Normal configuration of the stomach and duodenum.  No  abnormally dilated loops of bowel or bowel wall thickening. Normal  gas-filled appendix on axial series 3, images 160-155.     VESSELS: Aorta normal in course and caliber with calcified  atherosclerosis. Celiac, conventional hepatic, splenic arteries patent  and normal caliber. Superior mesenteric artery patent with mild stenosis  as it passes through soft tissue density on axial series 3, images  92-97.      Bilateral single renal and inferior mesenteric arteries patent and  normal caliber. Bilateral common iliac, internal iliac, external iliac,  common femoral, partially visualized superficial and deep femoral  arteries with multifocal mild to moderate stenosis due to  atherosclerosis.     RETROPERITONEUM: There is soft tissue density measuring 2.3 x 2.7 cm on  axial series 3, image 89, which encases the superior mesenteric artery  and abuts/partially encases the celiac artery. This appears to track  down toward or from the posterior aspect of the pancreas, difficult to  discretely delineate borders at the level of pancreas on this exam.  There is loss of fat plane between the posterior aspect of the pancreas  and the abnormal retroperitoneal soft tissue.     SOFT TISSUES: Body wall edema. Fat-containing umbilical hernia. Fat and  fluid containing RIGHT inguinal hernia. Small subcutaneous gas along the  chest tube tract.     BONES: No acute or aggressive bony lesion. Old healed RIGHT rib  fracture. No acute or suspicious bony finding. Degenerative changes in  the visualized spine.          Impression:      1. RIGHT chest tube with pleural gas and fluid. RIGHT lower lobe  consolidation with air-fluid level. Size overall similar compared to  8/3/2024 with  increased air-fluid component. In the setting of pneumonia  and empyema, pulmonary abscess is a consideration.     2. Superior mesenteric artery mildly narrowed as it passes through a  retroperitoneal soft tissue density. Soft tissue abuts and incompletely  encases the celiac artery, which is patent and normal caliber. Similar  appearance to 8/23/2024. Differential considerations include pancreatic  malignancy, lymphoma, retroperitoneal fibrosis, or metastatic disease.  Patient has evidence of prior LEFT renal surgery. Care Everywhere  indicates pathology from [facility], but actual pathology report is not  available for review.     3. Findings of cirrhosis with moderate to large ascites. Body wall  edema. Phase of contrast limits evaluation.     4. Gallstones.     This report was signed and finalized on 8/26/2024 5:24 PM by Dr Kelly Delong MD.       CT Outside Films [[id number]] Resulted: 08/26/24 1328     Updated: 08/26/24 1328    Narrative:      This procedure was auto-finalized with no dictation required.    XR Chest 1 View [[id number]] Collected: 08/26/24 1149     Updated: 08/26/24 1154    Narrative:      EXAM: XR CHEST 1 VW-      DATE: 8/26/2024 10:24 AM     HISTORY: s/p chest tube replacement; Z74.09-Other reduced mobility;  J86.9-Pyothorax without fistula       COMPARISON: 8/26/2024 at 3:22 a.m.     TECHNIQUE:  Frontal view(s) of the chest submitted.     FINDINGS:    The pigtail pleural drain has been removed on the right and a right  chest tube has been placed at the right lung base. Pleural-based opacity  laterally on the right is unchanged. There is no pneumothorax. The left  lung is grossly clear. Heart and mediastinum are unremarkable. There is  a left PICC tip at the right atrium.          Impression:         1. Removal of pigtail pleural drain and right chest tube placement with  stable right lateral pleural-based opacity likely loculated pleural  effusion/empyema. No pneumothorax.     This report  was signed and finalized on 8/26/2024 11:51 AM by Sam Castaneda.               I have reviewed the patient's current medications.     Assessment/Plan   Assessment  Active Hospital Problems    Diagnosis     **Empyema lung     Microcytic anemia     Hypomagnesemia     Weight loss     History of traumatic brain injury     Abnormal finding on imaging - outpatient ct abd w/ ? empyema     Hypercalcemia     Severe malnutrition     Generalized weakness        Treatment Plan  Empyema.  ID consult.  CT surgery consult.  Previously been on Zosyn.  Status post Rocephin x 5 days.  Leukocytosis noted.  CT scan of the chest- pigtail catheter is in place within the patient's right posterior  pleural space- residual thickening of the visceral and parietal pleura with a small amount of residual fluid and air within the cavity, pigtail catheter is in excellent position-small left effusion is   Present-bibasilar atelectasis as well as patchy airspace disease in the lingular segment of the left upper lobe, liver is cirrhotic in morphology,  ascitic fluid  throughout the abdomen and pelvis, gallbladder is stone filled and  contracted, colon is normal in appearance, no evidence of mechanical ...   Status post tPA, pigtail catheter placed 8/19.  Plan for large bore chest tube today.  Plan for 2 weeks IV antibiotics outpatient, PICC placed.  Patient is on room air.  Infectious disease recommendation-  Outpatient IV antibiotic recommendations (please see progress note from August 23, 2024-he may need a physician who cares for him in the [facility] cosign his outpatient IV antibiotic orders-he indicates his primary care provider is through [facility] and has last name Omar):  1.  Diagnosis right chest empyema-Streptococcus mitis/oralis  2.  IV access-left arm PICC  3.  Thoracic surgeon-Presley Manzanares MD  4.  IV antibiotic recommendation:  Ceftriaxone 2 g IV daily through September 9, 2024 (3 weeks total from initiation)  5.   Laboratory monitoring:  CBC, CMP, CRP every Monday while on intravenous antibiotic therapy  6.  Follow-up appointment 2 weeks after hospital discharge  CT surgeon recommendation-Okay for discharge home from CT Surgery standpoint when okay with others  He will follow-up with me in clinic in 2 weeks with repeat CT chest with contrast     Abdomen pain/Superior mesenteric artery mildly narrowed as it passes through a retroperitoneal soft tissue density- Soft tissue abuts and incompletely encases the celiac artery- which is patent and normal caliber- Similar appearance to 8/23/2024- Differential considerations include pancreatic Malignancy or lymphoma or retroperitoneal fibrosis or metastatic disease/gallstone. Vascular consult.  CT scan abdomen pelvic-pigtail catheter is in place within the patient's right posterior pleural space- residual thickening of the visceral and parietal pleura with a small amount of residual fluid and air within the cavity-  pigtail catheter is in excellent position. A small left effusion is  present. There is bibasilar atelectasis as well as patchy airspace  disease in the lingular segment of the left upper lobe, liver is cirrhotic in morphology- ascitic fluid throughout the abdomen and pelvis-gallbladder is stone filled and contracted, colon is normal in appearance- no evidence of mechanical obstruction but there is abnormal thickening of several loops of jejunum within the left mid abdomen- No discrete transition point or mechanical obstruction-nonspecific finding-no pneumatosis demonstrated-No portal venous gas, circumferential hypodensity surrounding the proximal segment of the superior mesenteric artery- extends from its origin for  approximately 3.5 cm along its course-associated luminal  stenosis of the proximal segment of the SMA-could represent  inflammatory change surrounding the proximal segment of the SMA or even potentially a pseudoaneurysm of the proximal segment of this vessel-  adjacent celiac artery is normal in appearance other than mild  Atherosclerosis- PRIMO is patent-calcific plaquing and stenosis of the proximal segment of the right renal artery, Periumbilical hernia containing ascitic fluid-bowel loop protrudes slightly into the hernial sac without obstruction or incarceration- right inguinal hernia is also present containing fat and some ascitic fluid.  CTA of the abdomen- RIGHT chest tube with pleural gas and fluid, RIGHT lower lobe consolidation with air-fluid level- Size overall similar compared to 8/3/2024 with increased air-fluid component. In the setting of pneumonia and empyema- pulmonary abscess is a consideration, . Superior mesenteric artery mildly narrowed as it passes through a  retroperitoneal soft tissue density- Soft tissue abuts and incompletely  encases the celiac artery- which is patent and normal caliber- Similar  appearance to 8/23/2024- Differential considerations include pancreatic  Malignancy or lymphoma or retroperitoneal fibrosis or metastatic disease, patient has evidence of prior LEFT renal surgery,,  cirrhosis with moderate to large ascites, Body wall edema, Gallstones.  Possible cancer discussed with patient, patient need to follow-up with her family physician.     Cirrhosis/ascites.  Lasix.  Spironolactone.     Lung nodule . stable 3-4 mm left upper lobe nodule image 64 series 2. Follow-up CT is recommended in 12 months per [employer] Society guidelines in patients ..., Discussed with patient, patient to follow-up family physician discussed.     Iron deficiency anemia -absolute iron deficiency on arrival.  Hemoccult was negative from outside facility.  Received 1 unit PRBC and hemoglobin has stayed up posttransfusion. Status post 3 days of IV iron and on oral.     History of TBI.       History of renal cancer with resection in the past-  No clear cancer malignancy seen on CTs to this point.       Hypercalcemia -resolved.      Hypokalemia -resolved.      Hypomagnesemia -resolved.     History of EtOH -patient states he stopped drinking about a month ago.  No signs of withdrawal here.  Will need PCP and GI follow-up for monitoring and care postdischarge.  Platelet counts are normal.  INR is normal.  Tolerating Aldactone started yesterday.  Will start some Lasix today.  Could potentially increase Aldactone further after.     Thrombocytosis     History of traumatic brain injury.     Reflux.  Pepcid.  Zofran as needed.  Tums as needed     Insomnia.  Melatonin at night     Smoker.  Nicotine patch.     Malnourished.  Boost supplement     Deconditioning.  PT consult     Medical Decision Making  Number and Complexity of problems: Empyema/abdomen pain/anemia/hx renal cancer  Differential Diagnosis: None     Conditions and Status        Condition is unchanged.     MDM Data  External documents reviewed: None  Cardiac tracing (EKG, telemetry) interpretation: None  Radiology interpretation: CT scan  Labs reviewed: Laboratory  Any tests that were considered but not ordered: Laboratory in AM     Decision rules/scores evaluated (example [id number], Wells, etc): None     Discussed with: Patient     Care Planning  Shared decision making: Patient   Code status and discussions: DNR     Disposition  Social Determinants of Health that impact treatment or disposition: From home  2 to 5 days    Electronically signed by Matthias Mcmahon MD, 08/27/24, 11:01 CDT.

## 2024-08-27 NOTE — DISCHARGE PLACEMENT REQUEST
"Ravin Alvares (64 y.o. Male)       Date of Birth   1960    Social Security Number       Address   [address] [place] [address]    Home Phone   [phone]    MRN   [id number]       Confucianist   Confucianism    Marital Status   Legally                             Admission Date   8/15/24    Admission Type   Urgent    Admitting Provider   Matthias Mcmahon MD    Attending Provider   Matthias Mcmahon MD    Department, Room/Bed   [facility] 4B, 452/2       Discharge Date       Discharge Disposition       Discharge Destination                                 Attending Provider: Matthias Mcmahon MD    Allergies: No Known Allergies    Isolation: None   Infection: None   Code Status: No CPR    Ht: 188 cm (74\")   Wt: 77.9 kg (171 lb 12.8 oz)    Admission Cmt: None   Principal Problem: Empyema lung [J86.9]                   Active Insurance as of 8/15/2024       Primary Coverage       Payor Plan Insurance Group Employer/Plan Group    HUMANA MEDICARE REPLACEMENT HUMANA MED ADV PPO [id number]       Payor Plan Address Payor Plan Phone Number Payor Plan Fax Number Effective Dates    [address] [address] [phone]  7/1/2024 - None Entered    [facility] [id number]         Subscriber Name Subscriber Birth Date Member ID       RAVIN ALVARES 1960 [id number]                     Emergency Contacts        (Rel.) Home Phone Work Phone Mobile Phone    Vidhi Borges (Sister) [phone] -- [phone]                    Mickey Laguerre MD   Physician  Infectious Disease     Progress Notes      Signed     Date of Service: 08/27/24 0945  Creation Time: 08/27/24 0945     Signed       Expand All Collapse All    Infectious Diseases Progress Note     Patient:  Ravin Alvares  YOB: 1960  MRN: [id number]       Admit date: 8/15/2024             Admitting Physician: Matthias Mcmahon MD  Primary Care Physician: Rk Nelson MD     Chief Complaint/Interval History: He is without " new symptoms.  He is comfortable at present.  Tolerating antibiotic treatment.  His other sister is at bedside today.  She indicates they could assist him in transporting to hospital in [place] if he needs a daily dose of antibiotics.  It sounds like he might have some assistance in addition to home health at home periodically as well.  No productive cough or sputum production.  Chest tube exchange yesterday.  Operative note reviewed.     Intake/Output Summary (Last 24 hours) at 2024 0945  Last data filed at 2024 0900      Gross per 24 hour   Intake 680 ml   Output 874 ml   Net -194 ml      Allergies:   Allergies   No Known Allergies     Current Scheduled Medications:     Scheduled Medication   cefTRIAXone, 2,000 mg, Intravenous, Q24H  famotidine, 20 mg, Oral, BID AC  ferrous sulfate, 325 mg, Oral, Daily With Breakfast  furosemide, 20 mg, Oral, Daily  nicotine, 1 patch, Transdermal, Q24H  sodium chloride, 10 mL, Intravenous, Q12H  spironolactone, 25 mg, Oral, Daily           Infusion Medications   lactated ringers, 9 mL/hr, Last Rate: 9 mL/hr (24 0957)         Current PRN Medications:    PRN Medication     acetaminophen **OR** acetaminophen **OR** acetaminophen    senna-docusate sodium **AND** polyethylene glycol **AND** bisacodyl **AND** bisacodyl    calcium carbonate    Calcium Replacement - Follow Nurse / BPA Driven Protocol    Magnesium Standard Dose Replacement - Follow Nurse / BPA Driven Protocol    melatonin    Morphine    ondansetron    oxyCODONE-acetaminophen    Phosphorus Replacement - Follow Nurse / BPA Driven Protocol    Potassium Replacement - Follow Nurse / BPA Driven Protocol    simethicone    sodium chloride    sodium chloride    sodium chloride        Review of Systems see HPI     Vital Signs:  Temp (24hrs), Av.7 °F (36.5 °C), Min:97.3 °F (36.3 °C), Max:98.2 °F (36.8 °C)     /78 (BP Location: Right arm, Patient Position: Lying)   Pulse 82   Temp 97.8 °F (36.6 °C) (Oral)    "Resp 18   Ht 188 cm (74\")   Wt 77.9 kg (171 lb 12.8 oz)   SpO2 95%   BMI 22.06 kg/m²      Physical Exam  Vital signs - reviewed.  Line/IV (left arm PICC) site - No erythema, warmth, induration, or tenderness.  Abdomen exam the same  Skin without rash     Lab Results:  CBC:           Results from last 7 days   Lab Units 08/26/24  0332 08/23/24  1035 08/22/24  0921 08/21/24  0440   WBC 10*3/mm3 10.85* 10.34  --  8.21   HEMOGLOBIN g/dL 7.8* 7.8* 9.4* 7.4*   HEMATOCRIT % 28.2* 28.2* 34.6* 26.3*   PLATELETS 10*3/mm3 585* 572*  --  314      BMP:          Results from last 7 days   Lab Units 08/26/24  0332 08/25/24  0600 08/23/24  0357 08/21/24  0440   SODIUM mmol/L 134* 133* 135* 135*   POTASSIUM mmol/L 4.1 4.6 4.4 4.2   CHLORIDE mmol/L 104 105 108* 109*   CO2 mmol/L 18.0* 17.0* 19.0* 15.0*   BUN mg/dL 11 10 8 8   CREATININE mg/dL 0.54* 0.59* 0.58* 0.63*   GLUCOSE mg/dL 92 93 89 89   CALCIUM mg/dL 8.1* 9.0 7.8* 7.3*   ALT (SGPT) U/L 12  --  14  --       Culture Results: No new results  Radiology: None  Additional Studies Reviewed: None     Impression:   1.  Loculated right pleural effusion/empyema-had large bore chest tube placed.  2.  Cirrhosis/ascites-exam stable.  Slight improvement with diuretic treatment.  Would recommend 1 diagnostic/therapeutic paracentesis.  Hopefully he could then be maintained with diuretic treatment.  3.  History of renal cell cancer  4.  Tobacco use-recommend cessation  5.  Weight loss-likely combination of empyema and cirrhosis/ascites  6.  Anemia     Recommendations:   Continue ceftriaxone  No change in antibiotic orders  He is tolerating ceftriaxone without antibiotic or PICC line related problem  It sounds like he may have some assistance at home with either home health plus family  He could not manage IV antibiotic treatment himself at home.  He would need somebody to assist with that infusion daily if he goes home.  Orders outlined below could be outpatient or home if he has adequate " help at home  Okay with me for discharge when ready for release per others and antibiotic arrangements in place     Outpatient IV antibiotic recommendations (please see progress note from August 23, 2024-he may need a physician who cares for him in the [facility] cosign his outpatient IV antibiotic orders-he indicates his primary care provider is through [facility] and has last name Omar):  1.  Diagnosis right chest empyema-Streptococcus mitis/oralis  2.  IV access-left arm PICC  3.  Thoracic surgeon-Presley Manzanares MD  4.  IV antibiotic recommendation:  Ceftriaxone 2 g IV daily through September 9, 2024 (3 weeks total from initiation)  5.  Laboratory monitoring:  CBC, CMP, CRP every Monday while on intravenous antibiotic therapy  6.  Follow-up appointment 2 weeks after hospital discharge     Mickey Zamora MD               [facility] has requested an order to be sent for the Ceftriaxone, so they can provide outpatient infusions in the ER. Thank you!

## 2024-08-27 NOTE — CASE MANAGEMENT/SOCIAL WORK
Continued Stay Note  [facility]     Patient Name: Ravin Garza  MRN: [id number]  Today's Date: 8/27/2024    Admit Date: 8/15/2024        Discharge Plan       Row Name 08/27/24 1410       Plan    Plan Comments PT requesting to finish IV Abx as an outpatient at [facility]. SW has contacted PT's PCP office to request orders to be sent to [facility]. MATT is awaiting return call to verify this has been completed.    Final Discharge Disposition Code 01 - home or self-care                   Discharge Codes    No documentation.                 Expected Discharge Date and Time       Expected Discharge Date Expected Discharge Time    Aug 26, 2024               XIMENA Raza

## 2024-08-28 ENCOUNTER — APPOINTMENT (OUTPATIENT)
Dept: GENERAL RADIOLOGY | Facility: HOSPITAL | Age: 64
End: 2024-08-28
Payer: MEDICARE

## 2024-08-28 ENCOUNTER — TELEPHONE (OUTPATIENT)
Dept: CARDIAC SURGERY | Facility: CLINIC | Age: 64
End: 2024-08-28
Payer: MEDICARE

## 2024-08-28 ENCOUNTER — TRANSCRIBE ORDERS (OUTPATIENT)
Dept: HOME HEALTH SERVICES | Facility: HOME HEALTHCARE | Age: 64
End: 2024-08-28
Payer: MEDICARE

## 2024-08-28 ENCOUNTER — READMISSION MANAGEMENT (OUTPATIENT)
Dept: CALL CENTER | Facility: HOSPITAL | Age: 64
End: 2024-08-28
Payer: MEDICARE

## 2024-08-28 ENCOUNTER — HOME HEALTH ADMISSION (OUTPATIENT)
Dept: HOME HEALTH SERVICES | Facility: HOME HEALTHCARE | Age: 64
End: 2024-08-28
Payer: MEDICARE

## 2024-08-28 VITALS
TEMPERATURE: 97.8 F | OXYGEN SATURATION: 95 % | BODY MASS INDEX: 22.05 KG/M2 | RESPIRATION RATE: 16 BRPM | WEIGHT: 171.8 LBS | HEIGHT: 74 IN | SYSTOLIC BLOOD PRESSURE: 107 MMHG | HEART RATE: 85 BPM | DIASTOLIC BLOOD PRESSURE: 81 MMHG

## 2024-08-28 LAB
ANION GAP SERPL CALCULATED.3IONS-SCNC: 6 MMOL/L (ref 5–15)
BUN SERPL-MCNC: 14 MG/DL (ref 8–23)
BUN/CREAT SERPL: 23.7 (ref 7–25)
CALCIUM SPEC-SCNC: 8.5 MG/DL (ref 8.6–10.5)
CHLORIDE SERPL-SCNC: 108 MMOL/L (ref 98–107)
CO2 SERPL-SCNC: 21 MMOL/L (ref 22–29)
CREAT SERPL-MCNC: 0.59 MG/DL (ref 0.76–1.27)
DEPRECATED RDW RBC AUTO: 84.1 FL (ref 37–54)
EGFRCR SERPLBLD CKD-EPI 2021: 107.7 ML/MIN/1.73
ERYTHROCYTE [DISTWIDTH] IN BLOOD BY AUTOMATED COUNT: 29.9 % (ref 12.3–15.4)
GLUCOSE SERPL-MCNC: 91 MG/DL (ref 65–99)
HCT VFR BLD AUTO: 28.6 % (ref 37.5–51)
HGB BLD-MCNC: 7.8 G/DL (ref 13–17.7)
MCH RBC QN AUTO: 22 PG (ref 26.6–33)
MCHC RBC AUTO-ENTMCNC: 27.3 G/DL (ref 31.5–35.7)
MCV RBC AUTO: 80.8 FL (ref 79–97)
PLATELET # BLD AUTO: 525 10*3/MM3 (ref 140–450)
PMV BLD AUTO: 8.4 FL (ref 6–12)
POTASSIUM SERPL-SCNC: 4.3 MMOL/L (ref 3.5–5.2)
QT INTERVAL: 388 MS
QTC INTERVAL: 469 MS
RBC # BLD AUTO: 3.54 10*6/MM3 (ref 4.14–5.8)
SODIUM SERPL-SCNC: 135 MMOL/L (ref 136–145)
WBC NRBC COR # BLD AUTO: 8.72 10*3/MM3 (ref 3.4–10.8)

## 2024-08-28 PROCEDURE — 80048 BASIC METABOLIC PNL TOTAL CA: CPT | Performed by: FAMILY MEDICINE

## 2024-08-28 PROCEDURE — 25010000002 CEFTRIAXONE PER 250 MG: Performed by: INTERNAL MEDICINE

## 2024-08-28 PROCEDURE — 85027 COMPLETE CBC AUTOMATED: CPT | Performed by: FAMILY MEDICINE

## 2024-08-28 PROCEDURE — 71045 X-RAY EXAM CHEST 1 VIEW: CPT

## 2024-08-28 PROCEDURE — 99232 SBSQ HOSP IP/OBS MODERATE 35: CPT | Performed by: NURSE PRACTITIONER

## 2024-08-28 PROCEDURE — 99231 SBSQ HOSP IP/OBS SF/LOW 25: CPT | Performed by: INTERNAL MEDICINE

## 2024-08-28 PROCEDURE — 74022 RADEX COMPL AQT ABD SERIES: CPT

## 2024-08-28 RX ORDER — SPIRONOLACTONE 25 MG/1
25 TABLET ORAL DAILY
Qty: 30 TABLET | Refills: 0 | Status: SHIPPED | OUTPATIENT
Start: 2024-08-29

## 2024-08-28 RX ORDER — HYDROCODONE BITARTRATE AND ACETAMINOPHEN 5; 325 MG/1; MG/1
1 TABLET ORAL EVERY 6 HOURS PRN
Qty: 24 TABLET | Refills: 0 | Status: SHIPPED | OUTPATIENT
Start: 2024-08-28

## 2024-08-28 RX ORDER — FUROSEMIDE 20 MG
20 TABLET ORAL DAILY
Qty: 30 TABLET | Refills: 0 | Status: SHIPPED | OUTPATIENT
Start: 2024-08-29

## 2024-08-28 RX ORDER — NICOTINE 21 MG/24HR
1 PATCH, TRANSDERMAL 24 HOURS TRANSDERMAL
Qty: 30 PATCH | Refills: 0 | Status: SHIPPED | OUTPATIENT
Start: 2024-08-29

## 2024-08-28 RX ORDER — CALCIUM CARBONATE 500 MG/1
2 TABLET, CHEWABLE ORAL 3 TIMES DAILY PRN
Qty: 60 TABLET | Refills: 0 | Status: SHIPPED | OUTPATIENT
Start: 2024-08-28

## 2024-08-28 RX ORDER — FAMOTIDINE 20 MG/1
20 TABLET, FILM COATED ORAL 2 TIMES DAILY PRN
Qty: 180 TABLET | Refills: 0 | Status: SHIPPED | OUTPATIENT
Start: 2024-08-28

## 2024-08-28 RX ORDER — FERROUS SULFATE 325(65) MG
325 TABLET ORAL
Qty: 90 TABLET | Refills: 0 | Status: SHIPPED | OUTPATIENT
Start: 2024-08-29

## 2024-08-28 RX ADMIN — FAMOTIDINE 20 MG: 20 TABLET, FILM COATED ORAL at 08:08

## 2024-08-28 RX ADMIN — CEFTRIAXONE SODIUM 2000 MG: 2 INJECTION, POWDER, FOR SOLUTION INTRAMUSCULAR; INTRAVENOUS at 08:07

## 2024-08-28 RX ADMIN — FUROSEMIDE 20 MG: 20 TABLET ORAL at 08:08

## 2024-08-28 RX ADMIN — ACETAMINOPHEN 650 MG: 325 TABLET ORAL at 08:14

## 2024-08-28 RX ADMIN — FERROUS SULFATE TAB 325 MG (65 MG ELEMENTAL FE) 325 MG: 325 (65 FE) TAB at 08:08

## 2024-08-28 RX ADMIN — ACETAMINOPHEN 650 MG: 325 TABLET ORAL at 01:19

## 2024-08-28 RX ADMIN — NICOTINE 1 PATCH: 21 PATCH, EXTENDED RELEASE TRANSDERMAL at 08:09

## 2024-08-28 RX ADMIN — SIMETHICONE 80 MG: 80 TABLET, CHEWABLE ORAL at 08:14

## 2024-08-28 RX ADMIN — Medication 10 ML: at 08:08

## 2024-08-28 RX ADMIN — SPIRONOLACTONE 25 MG: 25 TABLET ORAL at 08:08

## 2024-08-28 NOTE — PROGRESS NOTES
"Infectious Diseases Progress Note    Patient:  Ravin Garza  YOB: 1960  MRN: [id number]   Admit date: 8/15/2024   Admitting Physician: Matthias Mcmahon MD  Primary Care Physician: Rk Nelson MD    Chief Complaint/Interval History: ***    Intake/Output Summary (Last 24 hours) at 2024 1341  Last data filed at 2024 0905  Gross per 24 hour   Intake 240 ml   Output 450 ml   Net -210 ml     Allergies: No Known Allergies  Current Scheduled Medications:   cefTRIAXone, 2,000 mg, Intravenous, Q24H  famotidine, 20 mg, Oral, BID AC  ferrous sulfate, 325 mg, Oral, Daily With Breakfast  furosemide, 20 mg, Oral, Daily  nicotine, 1 patch, Transdermal, Q24H  sodium chloride, 10 mL, Intravenous, Q12H  spironolactone, 25 mg, Oral, Daily      lactated ringers, 9 mL/hr, Last Rate: 9 mL/hr (24 0957)       Current PRN Medications:    acetaminophen **OR** acetaminophen **OR** acetaminophen    senna-docusate sodium **AND** polyethylene glycol **AND** bisacodyl **AND** bisacodyl    calcium carbonate    Calcium Replacement - Follow Nurse / BPA Driven Protocol    Magnesium Standard Dose Replacement - Follow Nurse / BPA Driven Protocol    melatonin    ondansetron    Phosphorus Replacement - Follow Nurse / BPA Driven Protocol    Potassium Replacement - Follow Nurse / BPA Driven Protocol    simethicone    sodium chloride    sodium chloride    sodium chloride    Review of Systems     Vital Signs:  Temp (24hrs), Av.6 °F (36.4 °C), Min:97.5 °F (36.4 °C), Max:97.8 °F (36.6 °C)    /81 (BP Location: Right arm, Patient Position: Lying)   Pulse 85   Temp 97.8 °F (36.6 °C) (Oral)   Resp 16   Ht 188 cm (74\")   Wt 77.9 kg (171 lb 12.8 oz)   SpO2 95%   BMI 22.06 kg/m²     Physical Exam  Vital signs - reviewed.  Line/IV site - No erythema, warmth, induration, or tenderness.    Lab Results:  CBC:   Results from last 7 days   Lab Units 24  0305 24  1225 24  0332 24  1035 " "08/22/24  0921   WBC 10*3/mm3 8.72 11.25* 10.85* 10.34  --    HEMOGLOBIN g/dL 7.8* 9.1* 7.8* 7.8* 9.4*   HEMATOCRIT % 28.6* 33.3* 28.2* 28.2* 34.6*   PLATELETS 10*3/mm3 525* 674* 585* 572*  --      BMP:  Results from last 7 days   Lab Units 08/28/24  0305 08/27/24  1225 08/26/24  0332 08/25/24  0600 08/23/24  0357   SODIUM mmol/L 135* 135* 134* 133* 135*   POTASSIUM mmol/L 4.3 3.7 4.1 4.6 4.4   CHLORIDE mmol/L 108* 102 104 105 108*   CO2 mmol/L 21.0* 20.0* 18.0* 17.0* 19.0*   BUN mg/dL 14 12 11 10 8   CREATININE mg/dL 0.59* 0.70* 0.54* 0.59* 0.58*   GLUCOSE mg/dL 91 77 92 93 89   CALCIUM mg/dL 8.5* 8.6 8.1* 9.0 7.8*   ALT (SGPT) U/L  --  12 12  --  14     Culture Results: No results found for: \"BLOODCX\", \"URINECX\", \"WOUNDCX\", \"MRSACX\", \"RESPCX\", \"STOOLCX\"  Radiology: None  Additional Studies Reviewed: None    Impression:     Recommendations:     Mickey Zamora MD  " IV antibiotic treatment with family assistance at home  Apparently home health is agreed to come daily to assist with his IV antibiotic treatment as well  See outpatient IV antibiotic orders outlined below    IV antibiotic orders:  1.  Diagnosis right chest empyema-Streptococcus mitis/oralis  2.  IV access-left arm PICC  3.  Thoracic surgeon-Presley Manzanares MD  4.  IV antibiotic recommendation:  Ceftriaxone 2 g IV daily through September 9, 2024 (3 weeks total from initiation)  5.  Laboratory monitoring:  CBC, CMP, CRP every Monday while on intravenous antibiotic therapy  6.  Follow-up appointment 2 weeks after hospital discharge    Mickey Zamora MD

## 2024-08-28 NOTE — CASE MANAGEMENT/SOCIAL WORK
Continued Stay Note  [facility]     Patient Name: Ravin Garza  MRN: [id number]  Today's Date: 8/28/2024    Admit Date: 8/15/2024        Discharge Plan       Row Name 08/28/24 0809       Plan    Plan Comments PT decided not to leave AMA yesterday and has stayed for his IV ABX to be arranged. MATT has called Dr. Nelson's office this morning and spoke with staff who state that Dr. Nelson was not clear on why he needed to write an order rather than the hospitalist or ID writing an order. MATT was only able to leave a brief message with the call center yesterday and the details were not provided to Dr Nelson. MATT has explained the need and the reasoning, Dr Nelson is being updated and MATT is anticipating a return call regarding order status shortly.    Final Discharge Disposition Code 01 - home or self-care                   Discharge Codes    No documentation.                 Expected Discharge Date and Time       Expected Discharge Date Expected Discharge Time    Aug 26, 2024               XIMENA Raza

## 2024-08-28 NOTE — CASE MANAGEMENT/SOCIAL WORK
Continued Stay Note  [facility]     Patient Name: Ravin Garza  MRN: [id number]  Today's Date: 8/28/2024    Admit Date: 8/15/2024        Discharge Plan       Row Name 08/28/24 1235       Plan    Plan Comments PT's PCP, Dr. Nelson has declined to write the IV Abx order so that PT can go to [facility] for outpatient infusions. SW has spoken with PT and provided with the options to come here for outpatient infusions or to arrange home infusions. PT has decided to go back to the plan of having home infusions. SW has not been able to reach anyone at [facility] and arranging HH has proven difficult. [facility] can accept PT if he is dc today and is using [facility]. SW has contacted [facility] and they are working on the referral. SW is awaiting a return call from [facility].    Final Discharge Disposition Code 06 - home with home health care      Row Name 08/28/24 1010       Plan    Final Discharge Disposition Code 01 - home or self-care                   Discharge Codes    No documentation.                 Expected Discharge Date and Time       Expected Discharge Date Expected Discharge Time    Aug 28, 2024               XIMENA Raza

## 2024-08-28 NOTE — PROGRESS NOTES
"Patient name: Ravin Garza  Patient : 1960  VISIT # [id number]  MR #[id number]    Procedure: CT-guided right chest tube placement by radiology  Procedure Date: 2024      Subjective   Chief complaint: Weakness    On room air.  Right chest tube remains in place to Heimlich valve with minimal drainage.  Outpatient antibiotics are being arranged and plans are in place to discharge home today.    ROS: No fevers or chills.  Stable right side chest wall pain.  No shortness of breath       Objective     Visit Vitals  /81 (BP Location: Right arm, Patient Position: Lying)   Pulse 85   Temp 97.8 °F (36.6 °C) (Oral)   Resp 16   Ht 188 cm (74\")   Wt 77.9 kg (171 lb 12.8 oz)   SpO2 95%   BMI 22.06 kg/m²       Intake/Output Summary (Last 24 hours) at 2024 0840  Last data filed at 2024 0632  Gross per 24 hour   Intake 360 ml   Output 675 ml   Net -315 ml     Right chest tube: Minimal serosanguineous output    Lab:     CBC:  Results from last 7 days   Lab Units 24  0305 24  1225 24  0332   WBC 10*3/mm3 8.72 11.25* 10.85*   HEMATOCRIT % 28.6* 33.3* 28.2*   PLATELETS 10*3/mm3 525* 674* 585*          BMP:  Results from last 7 days   Lab Units 24  0305 24  1225 24  0332   SODIUM mmol/L 135* 135* 134*   POTASSIUM mmol/L 4.3 3.7 4.1   CHLORIDE mmol/L 108* 102 104   CO2 mmol/L 21.0* 20.0* 18.0*   GLUCOSE mg/dL 91 77 92   BUN mg/dL 14 12 11   CREATININE mg/dL 0.59* 0.70* 0.54*          COAG:  Results from last 7 days   Lab Units 24  0332   INR  1.08   APTT seconds 38.8*         IMAGES:       Imaging Results (Last 24 Hours)       Procedure Component Value Units Date/Time    XR Chest 1 View [[id number]] Collected: 24     Updated: 24    Narrative:      EXAMINATION: XR CHEST 1 VW-     2024 2:38 AM     HISTORY: right pleural effusion; Z74.09-Other reduced mobility;  J86.9-Pyothorax without fistula; E43-Unspecified severe " protein-calorie  malnutrition; E83.52-Hypercalcemia; R93.89-Abnormal findings on  diagnostic imaging of other specified body structures; Z87.820-Personal  history of traumatic brain injury; R63.4-Abnormal weight loss;  E83.42-Hypomagnesemia; D50.9-Iron deficiency anemia, unspecified;  R53.1-W     A frontal lordotic projection of the chest is compared with the previous  study dated 8/27/2024.     The lungs are poorly expanded, worse than the previous study.     The right-sided chest tube has been removed in the interval.     Ill-defined opacity in the right lower lung probably represent  atelectatic changes with a possible loculated fluid.     There is improvement in interstitial infiltrate/atelectasis in the left  parahilar area since the previous study.     There is decrease in amount of loculated fluid along the right lower  lateral chest wall since the previous study.     There is no pulmonary congestion. There is no pneumothorax.     The heart size is not optimally evaluated due to the AP projection.     Left-sided PICC line is in place.     No acute bony abnormality. Hardware fusion of the cervical spine is  visualized.       Impression:      1. Removal of the right-sided chest tube since the previous study.  2. Decrease in the loculated fluid along the right lateral chest wall.  Improved coarse interstitial changes/atelectasis/infiltrate since the  previous study.  3. The remaining cardiopulmonary status is unchanged.     This report was signed and finalized on 8/28/2024 7:07 AM by Dr. Veronika Gutierrez MD.             CXR: Ongoing right pleural effusion.  Unable to visualize right sided chest tube    Physical Exam:  General: Alert, oriented. No apparent distress.  Chronically ill-appearing  Cardiovascular: Regular rate and rhythm without murmur, rubs, or gallops.    Pulmonary: Clear to auscultation bilaterally without wheezing, rubs, or rales.  Chest: Right chest tube in place at the 9 cm pedrito at the skin.   Heimlich valve in place.  Extremities: Warm, moves all extremities. No edema.   Neurologic:  Grossly intact with no focal deficits.          Impression:  Right pleural effusion  Generalized weakness  Weight loss    Plan:  PA and lateral chest x-ray this morning to hopefully better visualize placement of right-sided chest tube.  This was retracted at bedside yesterday by Dr. Manzanares and is now at the 9 cm pedrito at the skin.  Continue protein supplements with each meal  Antibiotic management per infectious disease  Chest tube management and teaching was performed yesterday with patient and family at bedside  He will follow-up with me in clinic in 2 weeks with repeat CT chest with contrast.  Likely okay for discharge home today after reviewing repeat CXR.    Discussed with patient, nursing, and Dr. Lisandro Doan, KRIS  08/28/24  08:40 CDT

## 2024-08-28 NOTE — PLAN OF CARE
Goal Outcome Evaluation:  Plan of Care Reviewed With: patient        Progress: no change  Outcome Evaluation: VSS, A&O, no telemetry, on RA. Pt abdomen is distended, pt has had multiple c/o pain through the shift. He was upset he was no longer recieving IV Morphine and percocet PO, hospitalist on-call ordered a 1 time dose Percocet and pt recieved Tylenol after that. He slept through most of the shift. Chest tube connected to a bag with guaze inside at his right side with a very small amount of unmeasurable drainage inside. LUE PICC C/D/I dressing and flushes well. Pt to be on IV ABX outpatient after DC.

## 2024-08-28 NOTE — TELEPHONE ENCOUNTER
"Christina Milligan calling re: pt and asking to speak with Hilary PONCE.  States originally pt was to be d/c'd to have med via port in [place] with PCP but they will not do this.  She is wanting to have this set up in [place] now instead.  She is also wondering if this can be set as a \"double dose every other day\".  Can reach her at #[phone]/donald  "

## 2024-08-28 NOTE — THERAPY DISCHARGE NOTE
Acute Care - Physical Therapy Discharge Summary  [facility]       Patient Name: Ravin Garza  : 1960  MRN: [id number]    Today's Date: 2024                 Admit Date: 8/15/2024      PT Recommendation and Plan    Visit Dx:    ICD-10-CM ICD-9-CM   1. Impaired mobility [Z74.09]  Z74.09 799.89   2. Empyema lung  J86.9 510.9   3. Severe malnutrition  E43 261   4. Hypercalcemia  E83.52 275.42   5. Abnormal finding on imaging - outpatient ct abd w/ ? empyema  R93.89 793.99   6. History of traumatic brain injury  Z87.820 V15.52   7. Weight loss  R63.4 783.21   8. Hypomagnesemia  E83.42 275.2   9. Microcytic anemia  D50.9 280.9   10. Generalized weakness  R53.1 780.79   11. Burning in the chest  R07.89 786.59                PT Rehab Goals       Row Name 24 1500             Bed Mobility Goal 1 (PT)    Activity/Assistive Device (Bed Mobility Goal 1, PT) rolling to left;rolling to right;sit to supine;supine to sit  -AB      [place] Level/Cues Needed (Bed Mobility Goal 1, PT) independent  -AB      Time Frame (Bed Mobility Goal 1, PT) long term goal (LTG);10 days  -AB      Progress/Outcomes (Bed Mobility Goal 1, PT) goal partially met  -AB         Transfer Goal 1 (PT)    Activity/Assistive Device (Transfer Goal 1, PT) sit-to-stand/stand-to-sit;bed-to-chair/chair-to-bed;toilet;tub;tub bench  -AB      [place] Level/Cues Needed (Transfer Goal 1, PT) independent  -AB      Time Frame (Transfer Goal 1, PT) long term goal (LTG);10 days  -AB      Strategies/Barriers (Transfers Goal 1, PT) independent if plan is to return home  -AB      Progress/Outcome (Transfer Goal 1, PT) goal not met  -AB         Gait Training Goal 1 (PT)    Activity/Assistive Device (Gait Training Goal 1, PT) gait (walking locomotion);decrease asymmetrical patterns;decrease fall risk;diminish gait deviation;forward stepping;improve balance and speed;increase endurance/gait distance;increase energy conservation;assistive device  use;walker, rolling  -AB      [place] Level (Gait Training Goal 1, PT) independent  -AB      Distance (Gait Training Goal 1, PT) 50' as needed for home safety and pain 0/10  -AB      Time Frame (Gait Training Goal 1, PT) long term goal (LTG);10 days  -AB      Progress/Outcome (Gait Training Goal 1, PT) goal not met  -AB                User Key  (r) = Recorded By, (t) = Taken By, (c) = Cosigned By      Initials Name Provider Type Discipline    Marcelina Astorga, PTA Physical Therapist Assistant PT                        PT Discharge Summary  Anticipated Discharge Disposition (PT): home with assist, home with home health  Reason for Discharge: Discharge from facility  Outcomes Achieved: Refer to plan of care for updates on goals achieved  Discharge Destination: Home with assist      Marcelina Roberson PTA   8/28/2024

## 2024-08-28 NOTE — CASE MANAGEMENT/SOCIAL WORK
Continued Stay Note  [facility]     Patient Name: Ravin Garza  MRN: [id number]  Today's Date: 8/28/2024    Admit Date: 8/15/2024        Discharge Plan       Row Name 08/28/24 2370       Plan    Plan Comments [facility] is able to provide meds at a cost of $172 weekly, as they are out of network with payer source. [facility] is able to accept PT and see him at home tomorrow. PT is agreeable to this plan. Arrangements made and PT ready for dc.      Row Name 08/28/24 0575       Plan    Plan Comments PT's PCP, Dr. Nelson has declined to write the IV Abx order so that PT can go to [facility] for outpatient infusions. SW has spoken with PT and provided with the options to come here for outpatient infusions or to arrange home infusions. PT has decided to go back to the plan of having home infusions. SW has not been able to reach anyone at [facility] and arranging HH has proven difficult. [facility] can accept PT if he is dc today and is using [facility]. MATT has contacted [facility] and they are working on the referral. SW is awaiting a return call from [facility].    Final Discharge Disposition Code 06 - home with home health care                   Discharge Codes    No documentation.                 Expected Discharge Date and Time       Expected Discharge Date Expected Discharge Time    Aug 28, 2024               XIMENA Raza

## 2024-08-28 NOTE — TELEPHONE ENCOUNTER
Called and spoke with Ariadne.  I advised her that outpatient antibiotics are set up by social work as ordered by infectious disease.  The  that is working on his case today is Hilary Alvarenga.  I have advised her to get in contact with her and provided this number.  She states she will call her today.

## 2024-08-28 NOTE — DISCHARGE SUMMARY
[facility] Medicine Services  DISCHARGE SUMMARY       Date of Admission: 8/15/2024  Date of Discharge:  8/28/2024  Primary Care Physician: Rk Nelson MD    Presenting Problem/History of Present Illness:      Final Discharge Diagnoses:  Active Hospital Problems    Diagnosis     **Empyema lung     Microcytic anemia     Hypomagnesemia     Weight loss     History of traumatic brain injury     Abnormal finding on imaging - outpatient ct abd w/ ? empyema     Hypercalcemia     Severe malnutrition     Generalized weakness        Consults: Vascular . infect disease.  CT surgery .    Procedures Performed:   Procedure:  Insertion of indwelling tunneled pleural catheter with cuff, CPT 87773    Procedure Performed: Pleural Drainage, percutaneous, with insertion of indwelling catheter     Pertinent Test Results:       Imaging Results (All)       Procedure Component Value Units Date/Time    XR Chest 1 View [[id number]] Collected: 08/28/24 0704     Updated: 08/28/24 0710    Narrative:      EXAMINATION: XR CHEST 1 VW-     8/28/2024 2:38 AM     HISTORY: right pleural effusion; Z74.09-Other reduced mobility;  J86.9-Pyothorax without fistula; E43-Unspecified severe protein-calorie  malnutrition; E83.52-Hypercalcemia; R93.89-Abnormal findings on  diagnostic imaging of other specified body structures; Z87.820-Personal  history of traumatic brain injury; R63.4-Abnormal weight loss;  E83.42-Hypomagnesemia; D50.9-Iron deficiency anemia, unspecified;  R53.1-W     A frontal lordotic projection of the chest is compared with the previous  study dated 8/27/2024.     The lungs are poorly expanded, worse than the previous study.     The right-sided chest tube has been removed in the interval.     Ill-defined opacity in the right lower lung probably represent  atelectatic changes with a possible loculated fluid.     There is improvement in interstitial infiltrate/atelectasis in the left  parahilar area  since the previous study.     There is decrease in amount of loculated fluid along the right lower  lateral chest wall since the previous study.     There is no pulmonary congestion. There is no pneumothorax.     The heart size is not optimally evaluated due to the AP projection.     Left-sided PICC line is in place.     No acute bony abnormality. Hardware fusion of the cervical spine is  visualized.       Impression:      1. Removal of the right-sided chest tube since the previous study.  2. Decrease in the loculated fluid along the right lateral chest wall.  Improved coarse interstitial changes/atelectasis/infiltrate since the  previous study.  3. The remaining cardiopulmonary status is unchanged.     This report was signed and finalized on 8/28/2024 7:07 AM by Dr. Veronika Gutierrez MD.       XR Chest 1 View [[id number]] Collected: 08/27/24 0707     Updated: 08/27/24 0713    Narrative:      EXAMINATION: XR CHEST 1 VW-     8/27/2024 2:30 AM     HISTORY: right pleural effusion; Z74.09-Other reduced mobility;  J86.9-Pyothorax without fistula     A frontal projection of the chest is compared with the previous study  dated 8/26/2024.     The lungs are poorly expanded.     There are coarse interstitial changes in the lungs bilaterally, left  more than the right, which appears moderately more progressive since the  previous study.     Right-sided chest tube is in place.     Loculated fluid is seen along the right lower lateral chest wall similar  to the previous study.     There is no pulmonary congestion or pneumothorax.     A left-sided PICC line is in place. No change.     The heart size is in the normal range. Atheromatous change of thoracic  aorta noted.     There is no acute bony abnormality. Hardware fusion of cervical spine is  visualized and is similar to the previous study.       Impression:      1. Moderately progressive coarse interstitial changes, left more than  the right, may partly be due to loss of lung  volume which is worse than  the previous study. However superimposed acute inflammatory/infectious  process is not excluded.  2. Right chest tube in place. A small loculated fluid along the right  lower lateral chest wall persist.     This report was signed and finalized on 8/27/2024 7:10 AM by Dr. Veronika Gutierrez MD.       CT Chest Without Contrast Diagnostic [[id number]] Collected: 08/26/24 1724     Updated: 08/26/24 1735    Narrative:      EXAM/TECHNIQUE: CT chest without contrast     INDICATION: empyema; Z74.09-Other reduced mobility; J86.9-Pyothorax  without fistula     COMPARISON: 8/23/2024     DLP: 604.63 mGy.cm. Automated exposure control was also utilized to  decrease patient radiation dose.     FINDINGS:     Right basilar pigtail pleural drain has been removed and a large caliber  right-sided chest tube has been placed. Right-sided chest tube  terminates in the medial right posterior mid chest. Of note, the tube  indents the right lower lobe and there is an area of kinking along the  chest wall soft tissues. There is a persistent small to moderate  right-sided hydropneumothorax present with surrounding pleural  thickening and subpleural atelectasis.      No change in subpleural opacities in the right posterior upper and lower  lobes, including a 4.0 x 2.4 cm masslike cavitary lesion on image 115 of  series 3.     A small left-sided pleural effusion is present. There is some patchy  groundglass opacity throughout the left upper and left lower lobe. No  area of consolidation. No left-sided pneumothorax. The central airways  are clear other than for a few scattered areas of endobronchial  mucus/secretions. Mild emphysema.     No enlarged thoracic lymph nodes. Main pulmonary artery is nondilated.  The ascending aorta is ectatic measuring up to 4.1 cm diameter. Heavy  coronary artery calcification. No pericardial effusion.     No large thyroid nodule. Left-sided PICC with tip in the proximal right  atrium.  Findings in the included upper abdomen are described in a  separate report.       Impression:         1.  Interval exchange of right basilar pigtail pleural drain for large  caliber right chest tube. Chest tube tip indents the right lower lobe  (intraparenchymal course is not excluded). A small to moderate  right-sided hydropneumothorax persists. Also of note, there is kinking  of the chest tube within the chest wall soft tissues. No change in  subpleural opacities in the right posterior upper and lower lobes  including a 4.0 x 2.4 cm masslike cavitary lesion on image 115 of series  3.     2.  Small left-sided pleural effusion. Patchy groundglass opacity  throughout the left lung suspicious for multifocal infectious process.     This report was signed and finalized on 8/26/2024 5:32 PM by Dr. Lamont Gonzalez MD.       CT Angiogram Abdomen Pelvis [[id number]] Collected: 08/26/24 1652     Updated: 08/26/24 1727    Narrative:      EXAM: CT ANGIOGRAM ABDOMEN PELVIS- - 8/26/2024 2:37 PM     HISTORY: f/u SMA abnormality on ct 8/23; Z74.09-Other reduced mobility;  J86.9-Pyothorax without fistula       COMPARISON: 8/23/2024.     DOSE LENGTH PRODUCT: 604.63 mGy.cm  Automatic exposure control was  utilized to make radiation dose as low as reasonably achievable.     TECHNIQUE: Enhanced  CT images of the abdomen and pelvis obtained with  multiplanar reformats. 3D postprocessing, including MIPs, performed and  images saved to PACS.     FINDINGS:  VISUALIZED CHEST: Interval exchange or replacement of RIGHT chest tube.  Hydropneumothorax at the RIGHT lung base. There is a RIGHT lower lobe  air-fluid level and consolidation measuring 2.4 x 4.5 cm on axial series  3, image 30, which appears similar size, but with increased air compared  to 8/23/2024. This could represent pulmonary abscess.     LEFT pleural fluid. No pericardial effusion. Normal inferior heart size  with scattered coronary artery calcifications.     Arterial phase  of imaging limits solid organ evaluation.     LIVER: Nodular shrunken liver. Portal vein not optimally evaluated on  this exam. Hepatic veins not opacified.     BILIARY: Gallstones. Gallbladder not distended. No bile duct dilation.     PANCREAS: Normal pancreas contour and enhancement.     SPLEEN: Normal size and contour. Small adjacent splenule.     ADRENAL: Normal appearance of the bilateral adrenal glands.     GENITOURINARY:  No hydronephrosis, urolithiasis or solid renal lesion. Small densities  adjacent to the LEFT kidney, favor postoperative changes.  Urinary bladder collapsed, which limits evaluation of the bladder wall.   Diminutive or absent prostate.     PERITONEUM: Moderate to large free fluid. No free air.     GI TRACT: Normal configuration of the stomach and duodenum.  No  abnormally dilated loops of bowel or bowel wall thickening. Normal  gas-filled appendix on axial series 3, images 160-155.     VESSELS: Aorta normal in course and caliber with calcified  atherosclerosis. Celiac, conventional hepatic, splenic arteries patent  and normal caliber. Superior mesenteric artery patent with mild stenosis  as it passes through soft tissue density on axial series 3, images  92-97.      Bilateral single renal and inferior mesenteric arteries patent and  normal caliber. Bilateral common iliac, internal iliac, external iliac,  common femoral, partially visualized superficial and deep femoral  arteries with multifocal mild to moderate stenosis due to  atherosclerosis.     RETROPERITONEUM: There is soft tissue density measuring 2.3 x 2.7 cm on  axial series 3, image 89, which encases the superior mesenteric artery  and abuts/partially encases the celiac artery. This appears to track  down toward or from the posterior aspect of the pancreas, difficult to  discretely delineate borders at the level of pancreas on this exam.  There is loss of fat plane between the posterior aspect of the pancreas  and the abnormal  retroperitoneal soft tissue.     SOFT TISSUES: Body wall edema. Fat-containing umbilical hernia. Fat and  fluid containing RIGHT inguinal hernia. Small subcutaneous gas along the  chest tube tract.     BONES: No acute or aggressive bony lesion. Old healed RIGHT rib  fracture. No acute or suspicious bony finding. Degenerative changes in  the visualized spine.          Impression:      1. RIGHT chest tube with pleural gas and fluid. RIGHT lower lobe  consolidation with air-fluid level. Size overall similar compared to  8/3/2024 with increased air-fluid component. In the setting of pneumonia  and empyema, pulmonary abscess is a consideration.     2. Superior mesenteric artery mildly narrowed as it passes through a  retroperitoneal soft tissue density. Soft tissue abuts and incompletely  encases the celiac artery, which is patent and normal caliber. Similar  appearance to 8/23/2024. Differential considerations include pancreatic  malignancy, lymphoma, retroperitoneal fibrosis, or metastatic disease.  Patient has evidence of prior LEFT renal surgery. Care Everywhere  indicates pathology from [facility], but actual pathology report is not  available for review.     3. Findings of cirrhosis with moderate to large ascites. Body wall  edema. Phase of contrast limits evaluation.     4. Gallstones.     This report was signed and finalized on 8/26/2024 5:24 PM by Dr Kelly Delong MD.       CT Outside Films [[id number]] Resulted: 08/26/24 1328     Updated: 08/26/24 1328    Narrative:      This procedure was auto-finalized with no dictation required.    XR Chest 1 View [[id number]] Collected: 08/26/24 1149     Updated: 08/26/24 1154    Narrative:      EXAM: XR CHEST 1 VW-      DATE: 8/26/2024 10:24 AM     HISTORY: s/p chest tube replacement; Z74.09-Other reduced mobility;  J86.9-Pyothorax without fistula       COMPARISON: 8/26/2024 at 3:22 a.m.     TECHNIQUE:  Frontal view(s) of the chest submitted.     FINDINGS:    The pigtail  pleural drain has been removed on the right and a right  chest tube has been placed at the right lung base. Pleural-based opacity  laterally on the right is unchanged. There is no pneumothorax. The left  lung is grossly clear. Heart and mediastinum are unremarkable. There is  a left PICC tip at the right atrium.          Impression:         1. Removal of pigtail pleural drain and right chest tube placement with  stable right lateral pleural-based opacity likely loculated pleural  effusion/empyema. No pneumothorax.     This report was signed and finalized on 8/26/2024 11:51 AM by Sam Castaneda.       XR Chest 1 View [[id number]] Collected: 08/26/24 0712     Updated: 08/26/24 0716    Narrative:      EXAMINATION: XR CHEST 1 VW-     8/26/2024 2:15 AM     HISTORY: right pleural effusion; Z74.09-Other reduced mobility;  J86.9-Pyothorax without fistula     1 view chest x-ray.     COMPARISON:  Yesterday at 3:17 a.m.     Normal heart and mediastinum.     Fully expanded lungs.     Unchanged pigtail catheter within the lower RIGHT hemithorax.  Trace loculated fluid along the RIGHT lateral hemithorax.  Mild RIGHT basilar atelectasis.     No pneumothorax.     The LEFT lung remains clear.     The LEFT PICC line remains in good position with the tip in the atrium.       Impression:      1. No significant change.           This report was signed and finalized on 8/26/2024 7:13 AM by Dr. Mark Gibson MD.       XR Chest 1 View [[id number]] Collected: 08/25/24 0820     Updated: 08/25/24 0824    Narrative:      EXAMINATION:  XR CHEST 1 VW-  8/25/2024 2:35 AM     HISTORY: Z74.09-Other reduced mobility; J86.9-Pyothorax without fistula.  Right chest tube.     COMPARISON: 8/24/2024.     TECHNIQUE: Single view AP image.     FINDINGS: A right chest tube overlies the right lung base. Pleural and  parenchymal density at the right lung base appears relatively stable.  The left lung remains clear. The heart is normal in size. No acute  bony  abnormality is seen. There is a new PICC line catheter via the left arm  with catheter tip near the superior vena cava right atrial junction.          Impression:      1. New PICC line catheter, as described.  2. Pleural and parenchymal opacity at the right lung base is stable.  Right chest tube overlies this area.           This report was signed and finalized on 8/25/2024 8:21 AM by Dr. Fer Jonas MD.       XR Chest 1 View [[id number]] Collected: 08/24/24 0851     Updated: 08/24/24 0855    Narrative:      EXAMINATION:  XR CHEST 1 VW-  8/24/2024 2:10 AM     HISTORY: Right pleural effusion. Chest tube. Z74.09-Other reduced  mobility; J86.9-Pyothorax without fistula     COMPARISON: 8/23/2024.     TECHNIQUE: Single view AP image.     FINDINGS: There is hypoventilation and vascular crowding. A right chest  tube overlies the right lung base and stable position. There is some  pleural fluid on the right that appears stable on x-ray. There is  atelectasis in both lung bases. The heart is normal in size. No acute  bony abnormality is seen.          Impression:      1. Pleural fluid on the right appears relatively stable. A right chest  tube remains in place.  2. Hypoventilation. Atelectasis in both lung bases.           This report was signed and finalized on 8/24/2024 8:52 AM by Dr. Fer Jonas MD.       CT Chest With Contrast Diagnostic [[id number]] Collected: 08/23/24 1650     Updated: 08/23/24 1703    Narrative:      EXAMINATION:  CT CHEST W CONTRAST DIAGNOSTIC-  8/23/2024 10:14 AM     HISTORY: Empyema. Z74.09-Other reduced mobility.     COMPARISON : 8/16/2024.     DLP: 750.51 mGy.cm Automated dosage reduction technique was utilized to  reduce patient dosage.     TECHNIQUE: CT was performed of the chest with IV contrast. Sagittal and  coronal images were reconstructed.     MEDIASTINUM, HEART AND VASCULAR STRUCTURES: There is atheromatous  calcification of the thoracic aorta and coronary arteries. The  ascending  thoracic aorta is dilated measuring 4.2 cm. The main pulmonary artery  segment is normal. There are small mediastinal lymph nodes that are not  pathologically enlarged. There is cardiomegaly.     LUNGS: There is a posterior chest tube in the right lung base. There is  residual fluid and air within the pleural space in this location. There  is right lower lobe volume loss. There is patchy infiltrate within the  right lower lobe. There are also patchy infiltrates in the left upper  lobe and left lower lobe. There is a small simple appearing left pleural  effusion.     UPPER ABDOMEN: The liver is macronodular. There are gallstones in the  gallbladder. Spleen size is mildly prominent. There is moderate to large  ascites in the upper abdomen.     BONES: There are degenerative changes of the spine. There has been prior  cervical fusion.          Impression:      1. A pleural-based fluid and air collection in the posterior pleural  space on the right side is again noted. There is still small to moderate  fluid contained within the collection. There is also a large air  collection contained. A right chest tube appears to be in good position.  2. Small left pleural effusion is new compared to the prior study.  3. There is patchy consolidation in the right lower lobe. There is right  lower lobe volume loss. There are new patchy infiltrates in the left  upper lobe and left lower lobe. Edema versus pneumonia.  4. Stable 3-4 mm left upper lobe nodule image 64 series 2. Follow-up CT  is recommended in 12 months per Fleischer Society guidelines in patients  who are at high risk. Optional CT recommended in patients that are not  high risk.  5. Macronodular liver consistent with liver cirrhosis. Moderate to large  ascites in the upper abdomen. Gallstones in the gallbladder.           This report was signed and finalized on 8/23/2024 5:00 PM by Dr. Fer Jonas MD.       CT Abdomen Pelvis With Contrast [[id number]]  Collected: 08/23/24 1140     Updated: 08/23/24 1206    Narrative:      CT ABDOMEN PELVIS W CONTRAST- 8/23/2024 10:14 AM     HISTORY: abdominal pain; Z74.09-Other reduced mobility       COMPARISON: None.     DLP: 750.51 mGy.cm. All CT scans are performed using dose optimization  techniques as appropriate to the performed exam and including at least  one of the following: Automated exposure control, adjustment of the mA  and/or kV according to size, and the use of the iterative reconstruction  technique.     TECHNIQUE: Following the intravenous administration of contrast, helical  CT tomographic images of the abdomen and pelvis were acquired. Coronal  reformatted images were also provided for review.     FINDINGS:  A small left effusion is present. There is a persistent collection  within the posterior costophrenic angle with associated thickening of  the visceral and parietal pleura. There is some residual fluid within  the pleural collection. The chest tube is in excellent position. There  is right basilar atelectasis. Patchy airspace disease in the lingular  segment of left upper lobe is also present. Coronary artery  calcifications are present. No cardiac enlargement.     LIVER: The liver is cirrhotic in morphology. There is ascites within the  abdomen and pelvis including within the perihepatic space. No discrete  hepatic lesion..     BILIARY SYSTEM: The gallbladder is contracted and stone filled. No  biliary dilatation is present..     PANCREAS: No focal pancreatic lesion.     SPLEEN: There is a 1.5 cm hypodensity associated with the spleen likely  represent either a splenic cyst or hemangioma..     KIDNEYS AND ADRENALS: The adrenals are unremarkable. There is a 2.7 cm  cortical cyst involving anterior interpolar aspect of the right kidney.  There is a 3.5 cm cyst involving the lower pole of the right kidney.  Curvilinear radiodensity along the lateral interpolar and lower pole  aspect of the left kidney could  represent sequela of a previously  ruptured cyst or may be related to previous partial nephrectomy. This is  not included in the patient's electronic medical record but I would  favor that this represents postoperative change.. The ureters are  decompressed and normal in appearance.     RETROPERITONEUM: There is hypodensity surrounding the origin and  proximal segment of the superior mesenteric artery with associated  luminal narrowing of the proximal SMA. This measures approximately 2.2 x  2.3 cm in the coronal plane and I would question whether this may  represent a pseudoaneurysm of the proximal segment of the SMA. It  extends for approximately 3.5 cm distal to the origin of the vessel.  Again, there is associated luminal stenosis in the proximal segment of  the SMA. The celiac axis and PRIMO are patent. No retroperitoneal hematoma  or adenopathy demonstrated..     GI TRACT: There is some mild prominence of the wall within several loops  of more proximal jejunum. No discrete transition point demonstrated. No  mechanical obstruction. No evidence of pneumatosis. The appendix is  identified and is normal in appearance.. The colon is normal in  distribution and appearance. No gastric wall thickening or gastric  outlet obstruction. There is a periumbilical hernia containing ascitic  fluid. A short segment of small bowel also protrudes into the hernial  sac but without evidence of obstruction..     OTHER: There is moderate ascites throughout the abdomen and pelvis.  There is mild associated induration in the small bowel mesentery without  evidence of adenopathy or discrete mass.. No evidence of abdominal  aortic aneurysm.. The osseous structures and soft tissues demonstrate no  worrisome lesions. There is a periumbilical hernia containing ascitic  fluid. A short segment of bowel protrudes slightly into the hernial sac  without obstruction or incarceration.     PELVIS: There is ascitic fluid within the pelvis. There is  prominence of  the urinary bladder wall. A right inguinal hernia is present containing  a small amount of ascitic fluid and fat. There is mild third spacing of  fluid with body wall edema present.. No evidence of bladder stone..       Impression:      1. A pigtail catheter is in place within the patient's right posterior  pleural space. There is residual thickening of the visceral and parietal  pleura with a small amount of residual fluid and air within the cavity.  The pigtail catheter is in excellent position. A small left effusion is  present. There is bibasilar atelectasis as well as patchy airspace  disease in the lingular segment of the left upper lobe.  2. The liver is cirrhotic in morphology. There is ascitic fluid  throughout the abdomen and pelvis. The gallbladder is stone filled and  contracted.  3. The colon is normal in appearance. There is no evidence of mechanical  obstruction but there is abnormal thickening of several loops of jejunum  within the left mid abdomen. No discrete transition point or mechanical  obstruction. This is a nonspecific finding. There is no pneumatosis  demonstrated. No portal venous gas.  4. There is circumferential hypodensity surrounding the proximal segment  of the superior mesenteric artery. This extends from its origin for  approximately 3.5 cm along its course. There is associated luminal  stenosis of the proximal segment of the SMA. This could represent  inflammatory change surrounding the proximal segment of the SMA or even  potentially a pseudoaneurysm of the proximal segment of this vessel. The  adjacent celiac artery is normal in appearance other than mild  atherosclerosis. This finding is present on the lowermost cut of the  previous CT of the chest dated 8/16/2024. The PRIMO is patent. There is  calcific plaquing and stenosis of the proximal segment of the right  renal artery.  5. Periumbilical hernia containing ascitic fluid. A bowel loop protrudes  slightly into  the hernial sac without obstruction or incarceration. A  right inguinal hernia is also present containing fat and some ascitic  fluid.        This report was signed and finalized on 8/23/2024 12:03 PM by Dr. Jalen Mccarty MD.       XR Chest 1 View [[id number]] Collected: 08/23/24 0648     Updated: 08/23/24 0654    Narrative:      EXAMINATION: XR CHEST 1 VW-     8/23/2024 2:20 AM     HISTORY: right pleural effusion; Z74.09-Other reduced mobility     A frontal projection of the chest is compared with the previous study  dated 8/22/2024.     The lungs are poorly expanded, worse than the previous study.     A small caliber catheter in the right lower chest is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 patent and normal caliber- Similar appearance to 8/23/2024- Differential considerations include pancreatic Malignancy or lymphoma or retroperitoneal fibrosis or metastatic disease/gallstone. Vascular consult.  CT scan abdomen pelvic-pigtail catheter is in place within the patient's right posterior pleural space- residual thickening of the visceral and parietal pleura with a small amount of residual fluid and air within the cavity-  pigtail catheter is in excellent position. A small left effusion is  present. There is bibasilar atelectasis as well as patchy airspace  disease in the lingular segment of the left upper lobe, liver is cirrhotic  in morphology- ascitic fluid throughout the abdomen and pelvis-gallbladder is stone filled and contracted, colon is normal in appearance- no evidence of mechanical obstruction but there is abnormal thickening of several loops of jejunum within the left mid abdomen- No discrete transition point or mechanical obstruction-nonspecific finding-no pneumatosis demonstrated-No portal venous gas, circumferential hypodensity surrounding the proximal segment of the superior mesenteric artery- extends from its origin for  approximately 3.5 cm along its course-associated luminal  stenosis of the proximal segment of the SMA-could represent  inflammatory change surrounding the proximal segment of the SMA or even potentially a pseudoaneurysm of the proximal segment of this vessel- adjacent celiac artery is normal in appearance other than mild  Atherosclerosis- PRIMO is patent-calcific plaquing and stenosis of the proximal segment of the right renal artery, Periumbilical hernia containing ascitic fluid-bowel loop protrudes slightly into the hernial sac without obstruction or incarceration- right inguinal hernia is also present containing fat and some ascitic fluid.  CTA of the abdomen- RIGHT chest tube with pleural gas and fluid, RIGHT lower lobe consolidation with air-fluid level- Size overall similar compared to 8/3/2024 with increased air-fluid component. In the setting of pneumonia and empyema- pulmonary abscess is a consideration, . Superior mesenteric artery mildly narrowed as it passes through a  retroperitoneal soft tissue density- Soft tissue abuts and incompletely  encases the celiac artery- which is patent and normal caliber- Similar  appearance to 8/23/2024- Differential considerations include pancreatic  Malignancy or lymphoma or retroperitoneal fibrosis or metastatic disease, patient has evidence of prior LEFT renal surgery,,  cirrhosis with moderate to large ascites, Body wall edema, Gallstones.  Possible cancer  "discussed with patient, patient need to follow-up with her family physician.     Cirrhosis/ascites.  Lasix.  Spironolactone.     Lung nodule . stable 3-4 mm left upper lobe nodule image 64 series 2. Follow-up CT is recommended in 12 months per Fleischer Society guidelines in patients ..., Discussed with patient, patient to follow-up family physician discussed.     Iron deficiency anemia -absolute iron deficiency on arrival.  Hemoccult was negative from outside facility.  Received 1 unit PRBC and hemoglobin has stayed up posttransfusion. Status post 3 days of IV iron and on oral.     History of TBI.       History of renal cancer with resection in the past-  No clear cancer malignancy seen on CTs to this point.       Hypercalcemia -resolved.      Hypokalemia -resolved.     Hypomagnesemia -resolved.     History of EtOH -patient states he stopped drinking about a month ago.  No signs of withdrawal here.  Will need PCP and GI follow-up for monitoring and care postdischarge.  Platelet counts are normal.  INR is normal.  Tolerating Aldactone started yesterday.  Will start some Lasix today.  Could potentially increase Aldactone further after.     Thrombocytosis     History of traumatic brain injury.     Reflux.  Pepcid.  Zofran as needed.  Tums as needed     Insomnia.  Melatonin at night     Smoker.  Nicotine patch.     Malnourished.  Boost supplement     Deconditioning.  PT consult     Vital signs stable.  Afebrile.  Patient anxiously wants to leave.  Antibiotics be arranged for .  Hopefully discharge today.  Follow-up with PCP 1 week.  Continue IV antibiotic as directed.  Follow-up with CT surgeon in 2 weeks.  Follow-up with infectious [facility] in 2 weeks.  Patient wants some home pain medication go home with secondary to chest tube pain.    Physical Exam on Discharge:  /81 (BP Location: Right arm, Patient Position: Lying)   Pulse 85   Temp 97.8 °F (36.6 °C) (Oral)   Resp 16   Ht 188 cm (74\")   Wt 77.9 " kg (171 lb 12.8 oz)   SpO2 95%   BMI 22.06 kg/m²   Physical Exam  Vitals and nursing note reviewed.   Constitutional:       Comments: Cachectic .   HENT:      Head: Normocephalic.   Eyes:      Conjunctiva/sclera: Conjunctivae normal.      Pupils: Pupils are equal, round, and reactive to light.   Cardiovascular:      Rate and Rhythm: Normal rate and regular rhythm.      Heart sounds: Normal heart sounds.   Pulmonary:      Effort: Pulmonary effort is normal. No respiratory distress.      Breath sounds: Normal breath sounds.      Comments: Chest tube right side, diminish breath sound bilateral, clear.  Patient is on room air.  Abdominal:      General: Bowel sounds are normal. There is no distension.      Palpations: Abdomen is soft.      Tenderness: There is no abdominal tenderness.   Musculoskeletal:         General: No swelling.      Cervical back: Neck supple.   Skin:     General: Skin is warm and dry.      Findings: No rash.   Neurological:      General: No focal deficit present.      Mental Status: He is alert and oriented to person, place, and time.   Psychiatric:         Mood and Affect: Mood normal.         Behavior: Behavior normal.         Thought Content: Thought content normal.           Condition on Discharge: Stable.     Discharge Disposition: Discharge home with family  Home or Self Care    Discharge Medications:     Discharge Medications        New Medications        Instructions Start Date   calcium carbonate 500 MG chewable tablet  Commonly known as: TUMS   2 tablets, Oral, 3 Times Daily PRN      cefTRIAXone 2,000 mg in sodium chloride 0.9 % 100 mL IVPB   2,000 mg, Intravenous, Every 24 Hours   Start Date: August 29, 2024     famotidine 20 MG tablet  Commonly known as: PEPCID   20 mg, Oral, 2 Times Daily PRN      ferrous sulfate 325 (65 FE) MG tablet   325 mg, Oral, Daily With Breakfast   Start Date: August 29, 2024     furosemide 20 MG tablet  Commonly known as: LASIX   20 mg, Oral, Daily   Start  Date: August 29, 2024     HYDROcodone-acetaminophen 5-325 MG per tablet  Commonly known as: Norco   1 tablet, Oral, Every 6 Hours PRN      nicotine 21 MG/24HR patch  Commonly known as: NICODERM CQ   1 patch, Transdermal, Every 24 Hours Scheduled   Start Date: August 29, 2024     spironolactone 25 MG tablet  Commonly known as: ALDACTONE   25 mg, Oral, Daily   Start Date: August 29, 2024                Discharge Diet:   Diet Instructions       Advance Diet As Tolerated -Target Diet: Regular diet      Target Diet: Regular diet            Activity at Discharge:   Activity Instructions       Activity as Tolerated              Follow-up Appointments:    Follow-up with PCP 1 week.  Follow-up with infectious [facility] in 2 weeks.  Follow-up with CT surgeon in 2 weeks      Electronically signed by Matthias Mcmahon MD, 08/28/24, 08:32 CDT.    Time: Greater than 30 minutes.

## 2024-08-29 ENCOUNTER — HOME CARE VISIT (OUTPATIENT)
Dept: HOME HEALTH SERVICES | Facility: HOME HEALTHCARE | Age: 64
End: 2024-08-29
Payer: MEDICARE

## 2024-08-29 ENCOUNTER — HOME CARE VISIT (OUTPATIENT)
Dept: HOME HEALTH SERVICES | Facility: CLINIC | Age: 64
End: 2024-08-29
Payer: MEDICARE

## 2024-08-29 ENCOUNTER — TELEPHONE (OUTPATIENT)
Age: 64
End: 2024-08-29
Payer: MEDICARE

## 2024-08-29 VITALS
SYSTOLIC BLOOD PRESSURE: 108 MMHG | HEART RATE: 98 BPM | RESPIRATION RATE: 18 BRPM | DIASTOLIC BLOOD PRESSURE: 70 MMHG | OXYGEN SATURATION: 99 %

## 2024-08-29 VITALS — OXYGEN SATURATION: 99 % | RESPIRATION RATE: 18 BRPM | TEMPERATURE: 97.3 F | HEART RATE: 98 BPM

## 2024-08-29 PROCEDURE — G0151 HHCP-SERV OF PT,EA 15 MIN: HCPCS

## 2024-08-29 PROCEDURE — G0299 HHS/HOSPICE OF RN EA 15 MIN: HCPCS
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
## 2024-08-29 NOTE — OUTREACH NOTE
Prep Survey      Flowsheet Row Responses   Judaism facility patient discharged from? [facility]   Is LACE score < 7 ? No   Eligibility [facility]   Date of Admission 08/15/24   Date of Discharge 08/28/24   Discharge Disposition Home or Self Care   Discharge diagnosis Empyema lung-Chest tube insertion   Does the patient have one of the following disease processes/diagnoses(primary or secondary)? Other   Does the patient have Home health ordered? Yes   What is the Home health agency?  [facility]  [facility] for IV abx-pending at discharge   Medication alerts for this patient IV abx   Prep survey completed? Yes            IRAM ALMEIDA - Registered Nurse

## 2024-08-29 NOTE — TELEPHONE ENCOUNTER
I received voicemail from patient's sister Fany at 1413 stating his home health nurse is there to see him, but his antibiotics haven't arrived yet.  She asked me to call her back with a tracking #.  [phone]    I received a another voicemail from patient's sister Fany today at 1425 asking me to return her call with a tracking # for his antibiotics.      I called Fany and told her I don't have the tracking #, but I can contact his infusion company and see what I can find out.      I called Leah, pharmacy manager with [employer] and got her voicemail.  I left her my direct phone # and asked her to return my call.      Leah returned my call.  He didn't get his medications through [facility].        I called Marcelina with [facility].  [employer] wouldn't deliver because they didn't have an apartment #, but he doesn't live in an apartment.  She is on the other line with [employer] working it out.  She has also been in contact with homecare and the on-call nurse will go out to get him started this evening.        I called Vidhi and let her know.

## 2024-08-30 ENCOUNTER — HOME CARE VISIT (OUTPATIENT)
Dept: HOME HEALTH SERVICES | Facility: CLINIC | Age: 64
End: 2024-08-30
Payer: MEDICARE

## 2024-08-30 ENCOUNTER — TELEPHONE (OUTPATIENT)
Dept: INTERVENTIONAL RADIOLOGY/VASCULAR | Facility: HOSPITAL | Age: 64
End: 2024-08-30

## 2024-08-30 ENCOUNTER — HOME CARE VISIT (OUTPATIENT)
Dept: HOME HEALTH SERVICES | Facility: HOME HEALTHCARE | Age: 64
End: 2024-08-30
Payer: MEDICARE

## 2024-08-30 PROCEDURE — G0299 HHS/HOSPICE OF RN EA 15 MIN: HCPCS
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
## 2024-08-31 ENCOUNTER — HOME CARE VISIT (OUTPATIENT)
Dept: HOME HEALTH SERVICES | Facility: CLINIC | Age: 64
End: 2024-08-31
Payer: MEDICARE

## 2024-08-31 VITALS
RESPIRATION RATE: 18 BRPM | SYSTOLIC BLOOD PRESSURE: 120 MMHG | HEART RATE: 86 BPM | TEMPERATURE: 97.9 F | DIASTOLIC BLOOD PRESSURE: 75 MMHG | OXYGEN SATURATION: 97 %

## 2024-08-31 PROCEDURE — G0299 HHS/HOSPICE OF RN EA 15 MIN: HCPCS

## 2024-09-01 ENCOUNTER — HOME CARE VISIT (OUTPATIENT)
Dept: HOME HEALTH SERVICES | Facility: CLINIC | Age: 64
End: 2024-09-01
Payer: MEDICARE

## 2024-09-01 PROCEDURE — G0299 HHS/HOSPICE OF RN EA 15 MIN: HCPCS

## 2024-09-01 NOTE — HOME HEALTH
FOCUS OF CARE/SKILLED NEED;  IV antibiotic administation, PICC line care, lab draws, med education, chest tube assesment    INSURANCE CHANGES no    FALLS SINCE LAST VISIT no    MEDICATION CHANGES SINCE LAST VISIT no    PLANS FOR NEXT VISIT IV antibiotic administration, PICC line care, chest tube assessment, cardiopulmonary assessment    PRESCREEN FOR COVID: yes no s/s covid no recent exposure

## 2024-09-02 ENCOUNTER — HOME CARE VISIT (OUTPATIENT)
Dept: HOME HEALTH SERVICES | Facility: CLINIC | Age: 64
End: 2024-09-02
Payer: MEDICARE

## 2024-09-02 ENCOUNTER — LAB REQUISITION (OUTPATIENT)
Dept: LAB | Facility: HOSPITAL | Age: 64
End: 2024-09-02
Payer: MEDICARE

## 2024-09-02 VITALS — RESPIRATION RATE: 18 BRPM | OXYGEN SATURATION: 97 % | HEART RATE: 87 BPM | TEMPERATURE: 97.6 F

## 2024-09-02 DIAGNOSIS — J86.9 PYOTHORAX WITHOUT FISTULA: ICD-10-CM

## 2024-09-02 LAB
ALBUMIN SERPL-MCNC: 2.7 G/DL (ref 3.5–5.2)
ALBUMIN/GLOB SERPL: 0.8 G/DL
ALP SERPL-CCNC: 144 U/L (ref 39–117)
ALT SERPL W P-5'-P-CCNC: 9 U/L (ref 1–41)
ANION GAP SERPL CALCULATED.3IONS-SCNC: 14 MMOL/L (ref 5–15)
AST SERPL-CCNC: 21 U/L (ref 1–40)
BASOPHILS # BLD AUTO: 0.06 10*3/MM3 (ref 0–0.2)
BASOPHILS NFR BLD AUTO: 0.6 % (ref 0–1.5)
BILIRUB SERPL-MCNC: 0.2 MG/DL (ref 0–1.2)
BUN SERPL-MCNC: 14 MG/DL (ref 8–23)
BUN/CREAT SERPL: 18.9 (ref 7–25)
CALCIUM SPEC-SCNC: 8.8 MG/DL (ref 8.6–10.5)
CHLORIDE SERPL-SCNC: 102 MMOL/L (ref 98–107)
CO2 SERPL-SCNC: 23 MMOL/L (ref 22–29)
CREAT SERPL-MCNC: 0.74 MG/DL (ref 0.76–1.27)
CRP SERPL-MCNC: 1.73 MG/DL (ref 0–0.5)
DEPRECATED RDW RBC AUTO: 85 FL (ref 37–54)
EGFRCR SERPLBLD CKD-EPI 2021: 100.6 ML/MIN/1.73
EOSINOPHIL # BLD AUTO: 0.1 10*3/MM3 (ref 0–0.4)
EOSINOPHIL NFR BLD AUTO: 1 % (ref 0.3–6.2)
ERYTHROCYTE [DISTWIDTH] IN BLOOD BY AUTOMATED COUNT: 29.2 % (ref 12.3–15.4)
GLOBULIN UR ELPH-MCNC: 3.6 GM/DL
GLUCOSE SERPL-MCNC: 69 MG/DL (ref 65–99)
HCT VFR BLD AUTO: 28.3 % (ref 37.5–51)
HGB BLD-MCNC: 7.9 G/DL (ref 13–17.7)
IMM GRANULOCYTES # BLD AUTO: 0.05 10*3/MM3 (ref 0–0.05)
IMM GRANULOCYTES NFR BLD AUTO: 0.5 % (ref 0–0.5)
LYMPHOCYTES # BLD AUTO: 0.88 10*3/MM3 (ref 0.7–3.1)
LYMPHOCYTES NFR BLD AUTO: 8.5 % (ref 19.6–45.3)
MCH RBC QN AUTO: 23 PG (ref 26.6–33)
MCHC RBC AUTO-ENTMCNC: 27.9 G/DL (ref 31.5–35.7)
MCV RBC AUTO: 82.5 FL (ref 79–97)
MONOCYTES # BLD AUTO: 0.63 10*3/MM3 (ref 0.1–0.9)
MONOCYTES NFR BLD AUTO: 6.1 % (ref 5–12)
NEUTROPHILS NFR BLD AUTO: 8.66 10*3/MM3 (ref 1.7–7)
NEUTROPHILS NFR BLD AUTO: 83.3 % (ref 42.7–76)
NRBC BLD AUTO-RTO: 0 /100 WBC (ref 0–0.2)
PLATELET # BLD AUTO: 430 10*3/MM3 (ref 140–450)
PMV BLD AUTO: 8.4 FL (ref 6–12)
POTASSIUM SERPL-SCNC: 3.3 MMOL/L (ref 3.5–5.2)
PROT SERPL-MCNC: 6.3 G/DL (ref 6–8.5)
RBC # BLD AUTO: 3.43 10*6/MM3 (ref 4.14–5.8)
SODIUM SERPL-SCNC: 139 MMOL/L (ref 136–145)
WBC NRBC COR # BLD AUTO: 10.38 10*3/MM3 (ref 3.4–10.8)

## 2024-09-02 PROCEDURE — 80053 COMPREHEN METABOLIC PANEL: CPT | Performed by: INTERNAL MEDICINE

## 2024-09-02 PROCEDURE — G0299 HHS/HOSPICE OF RN EA 15 MIN: HCPCS

## 2024-09-02 PROCEDURE — 86140 C-REACTIVE PROTEIN: CPT | Performed by: INTERNAL MEDICINE

## 2024-09-02 PROCEDURE — 85025 COMPLETE CBC W/AUTO DIFF WBC: CPT | Performed by: INTERNAL MEDICINE

## 2024-09-03 ENCOUNTER — HOME CARE VISIT (OUTPATIENT)
Dept: HOME HEALTH SERVICES | Facility: CLINIC | Age: 64
End: 2024-09-03
Payer: MEDICARE

## 2024-09-03 ENCOUNTER — TELEPHONE (OUTPATIENT)
Age: 64
End: 2024-09-03
Payer: MEDICARE

## 2024-09-03 VITALS
TEMPERATURE: 97.9 F | RESPIRATION RATE: 18 BRPM | SYSTOLIC BLOOD PRESSURE: 120 MMHG | DIASTOLIC BLOOD PRESSURE: 80 MMHG | OXYGEN SATURATION: 100 % | DIASTOLIC BLOOD PRESSURE: 72 MMHG | OXYGEN SATURATION: 97 % | HEART RATE: 85 BPM | TEMPERATURE: 97.6 F | SYSTOLIC BLOOD PRESSURE: 115 MMHG | HEART RATE: 72 BPM

## 2024-09-03 VITALS
HEART RATE: 92 BPM | RESPIRATION RATE: 18 BRPM | DIASTOLIC BLOOD PRESSURE: 68 MMHG | SYSTOLIC BLOOD PRESSURE: 102 MMHG | OXYGEN SATURATION: 96 % | TEMPERATURE: 97.3 F

## 2024-09-03 VITALS
RESPIRATION RATE: 17 BRPM | HEART RATE: 78 BPM | DIASTOLIC BLOOD PRESSURE: 68 MMHG | OXYGEN SATURATION: 99 % | SYSTOLIC BLOOD PRESSURE: 118 MMHG

## 2024-09-03 PROCEDURE — G0299 HHS/HOSPICE OF RN EA 15 MIN: HCPCS

## 2024-09-03 PROCEDURE — G0151 HHCP-SERV OF PT,EA 15 MIN: HCPCS

## 2024-09-03 RX ORDER — MELOXICAM 7.5 MG/1
7.5 TABLET ORAL DAILY PRN
COMMUNITY

## 2024-09-03 RX ORDER — EPINEPHRINE 0.3 MG/.3ML
0.3 INJECTION SUBCUTANEOUS ONCE
COMMUNITY
Start: 2024-08-28

## 2024-09-03 RX ORDER — CEFTRIAXONE 2 G/1
2 INJECTION, POWDER, FOR SOLUTION INTRAMUSCULAR; INTRAVENOUS EVERY 24 HOURS
COMMUNITY
Start: 2024-08-16 | End: 2024-09-06

## 2024-09-03 RX ORDER — DIPHENOXYLATE HYDROCHLORIDE AND ATROPINE SULFATE 2.5; .025 MG/1; MG/1
1 TABLET ORAL DAILY
COMMUNITY

## 2024-09-03 RX ORDER — CLONIDINE HYDROCHLORIDE 0.1 MG/1
0.1 TABLET ORAL TAKE AS DIRECTED
COMMUNITY

## 2024-09-03 NOTE — HOME HEALTH
FOCUS OF CARE/SKILLED NEED;  IV antibiotic administration, PICC line care and dressing changes, chest tube care, disease management    TEACHING/INTERVENTIONS PICC line care and IV administration, s/s of infection,     PROGRESS TOWARD GOALS ongoing    PHYSICIAN CONTACT no    INSURANCE CHANGES no    FALLS SINCE LAST VISIT no    MEDICATION CHANGES SINCE LAST VISIT no    PLANS FOR NEXT VISIT  IV antibiotic administration, PICC line care, chest tube assessment    PRESCREEN FOR COVID: yes no s/s covid no recent exposure

## 2024-09-03 NOTE — TELEPHONE ENCOUNTER
Patient called today stating he has more fluid around his stomach and it seems to be getting larger.  He called to see if we can make arrangements to have it drained.  He has a chest tube that is managed by home health.  He has an upcoming appt with Hilary Doan NP (cardiothoracic surgery) on 9/12/24.  He has follow-up with Dr. Zamora on 9/11/24.  He had a hospital follow-up appt scheduled with his PCP Rk Nelson MD, but he cancelled that appt.  He is concerned the fluid needs to be drained.  He reports he only missed one dose of IV antibiotics on 8/29/24.  He is tolerating the IV antibiotic and is not having any problems with his PICC line.  His potassium level is slightly low this week.  He is not taking any prescription potassium supplements but said he was while in the hospital.  I went over his medications with him.  Other than ceftriaxone 2 grams IV daily, he takes lasix 20 MG daily and spironolactone 25 MG daily.  He takes Tums PRN.  I told him I will report his fluid problem to Dr. Zamora and see what he recommends and I will return his call.  He was fine with this plan.      I informed Dr. Zamora of this phone conversation in office today.  He recommend that he contact his PCP office and inform of his increased abdominal swelling.  I called patient back and he didn't answer.  His voicemail has not been set up.  I was unable to leave a message.    Addendum at 4:19 PM on September 3, 2024:  I called the patient spoke with him on the phone.  He indicates he is taking diuretic treatment.  He indicates his abdominal girth is remaining increased.  I encouraged him to contact his primary care physician's office-Dr. Nelson.  I indicated they could likely arrange paracentesis through the hospital in [place].  He indicates home health is assisting with his IV ceftriaxone.  He indicates the infusions are going well.  He indicates his sisters are checking on him daily as well.  He has follow-up appointment  with next week.  He had no other needs or questions at this time.      Mickey Zamora MD

## 2024-09-04 ENCOUNTER — HOSPITAL ENCOUNTER (EMERGENCY)
Facility: HOSPITAL | Age: 64
Discharge: HOME | End: 2024-09-04
Attending: FAMILY MEDICINE
Payer: MEDICARE

## 2024-09-04 ENCOUNTER — APPOINTMENT (OUTPATIENT)
Dept: CT IMAGING | Facility: HOSPITAL | Age: 64
End: 2024-09-04
Payer: MEDICARE

## 2024-09-04 ENCOUNTER — HOME CARE VISIT (OUTPATIENT)
Dept: HOME HEALTH SERVICES | Facility: CLINIC | Age: 64
End: 2024-09-04
Payer: MEDICARE

## 2024-09-04 VITALS
WEIGHT: 183 LBS | OXYGEN SATURATION: 96 % | BODY MASS INDEX: 22.29 KG/M2 | HEIGHT: 76 IN | RESPIRATION RATE: 16 BRPM | HEART RATE: 92 BPM | SYSTOLIC BLOOD PRESSURE: 123 MMHG | TEMPERATURE: 97.5 F | DIASTOLIC BLOOD PRESSURE: 93 MMHG

## 2024-09-04 VITALS
DIASTOLIC BLOOD PRESSURE: 60 MMHG | SYSTOLIC BLOOD PRESSURE: 110 MMHG | OXYGEN SATURATION: 94 % | TEMPERATURE: 97.7 F | HEART RATE: 81 BPM

## 2024-09-04 DIAGNOSIS — R10.9 ABDOMINAL PAIN, UNSPECIFIED ABDOMINAL LOCATION: Primary | ICD-10-CM

## 2024-09-04 DIAGNOSIS — R18.8 OTHER ASCITES: ICD-10-CM

## 2024-09-04 LAB
ALBUMIN SERPL-MCNC: 2.6 G/DL (ref 3.5–5.2)
ALBUMIN/GLOB SERPL: 0.7 G/DL
ALP SERPL-CCNC: 138 U/L (ref 39–117)
ALT SERPL W P-5'-P-CCNC: 9 U/L (ref 1–41)
ANION GAP SERPL CALCULATED.3IONS-SCNC: 11 MMOL/L (ref 5–15)
APTT PPP: 22.9 SECONDS (ref 24.5–36)
AST SERPL-CCNC: 21 U/L (ref 1–40)
BACTERIA UR QL AUTO: ABNORMAL /HPF
BASOPHILS # BLD AUTO: 0.09 10*3/MM3 (ref 0–0.2)
BASOPHILS NFR BLD AUTO: 0.9 % (ref 0–1.5)
BILIRUB SERPL-MCNC: 0.2 MG/DL (ref 0–1.2)
BILIRUB UR QL STRIP: NEGATIVE
BUN SERPL-MCNC: 14 MG/DL (ref 8–23)
BUN/CREAT SERPL: 20.6 (ref 7–25)
CALCIUM SPEC-SCNC: 8.8 MG/DL (ref 8.6–10.5)
CHLORIDE SERPL-SCNC: 103 MMOL/L (ref 98–107)
CLARITY UR: CLEAR
CO2 SERPL-SCNC: 22 MMOL/L (ref 22–29)
COLOR UR: YELLOW
CREAT SERPL-MCNC: 0.68 MG/DL (ref 0.76–1.27)
D-LACTATE SERPL-SCNC: 0.8 MMOL/L (ref 0.5–2)
DEPRECATED RDW RBC AUTO: 82.2 FL (ref 37–54)
EGFRCR SERPLBLD CKD-EPI 2021: 103.2 ML/MIN/1.73
EOSINOPHIL # BLD AUTO: 0.22 10*3/MM3 (ref 0–0.4)
EOSINOPHIL NFR BLD AUTO: 2.2 % (ref 0.3–6.2)
ERYTHROCYTE [DISTWIDTH] IN BLOOD BY AUTOMATED COUNT: 28.5 % (ref 12.3–15.4)
GLOBULIN UR ELPH-MCNC: 3.7 GM/DL
GLUCOSE SERPL-MCNC: 88 MG/DL (ref 65–99)
GLUCOSE UR STRIP-MCNC: NEGATIVE MG/DL
HCT VFR BLD AUTO: 30.2 % (ref 37.5–51)
HGB BLD-MCNC: 8.7 G/DL (ref 13–17.7)
HGB UR QL STRIP.AUTO: NEGATIVE
HYALINE CASTS UR QL AUTO: ABNORMAL /LPF
IMM GRANULOCYTES # BLD AUTO: 0.06 10*3/MM3 (ref 0–0.05)
IMM GRANULOCYTES NFR BLD AUTO: 0.6 % (ref 0–0.5)
INR PPP: 1.04 (ref 0.91–1.09)
KETONES UR QL STRIP: NEGATIVE
LEUKOCYTE ESTERASE UR QL STRIP.AUTO: ABNORMAL
LIPASE SERPL-CCNC: 129 U/L (ref 13–60)
LYMPHOCYTES # BLD AUTO: 1.08 10*3/MM3 (ref 0.7–3.1)
LYMPHOCYTES NFR BLD AUTO: 10.6 % (ref 19.6–45.3)
MAGNESIUM SERPL-MCNC: 1.3 MG/DL (ref 1.6–2.4)
MCH RBC QN AUTO: 23.3 PG (ref 26.6–33)
MCHC RBC AUTO-ENTMCNC: 28.8 G/DL (ref 31.5–35.7)
MCV RBC AUTO: 81 FL (ref 79–97)
MONOCYTES # BLD AUTO: 0.57 10*3/MM3 (ref 0.1–0.9)
MONOCYTES NFR BLD AUTO: 5.6 % (ref 5–12)
NEUTROPHILS NFR BLD AUTO: 8.2 10*3/MM3 (ref 1.7–7)
NEUTROPHILS NFR BLD AUTO: 80.1 % (ref 42.7–76)
NITRITE UR QL STRIP: NEGATIVE
NRBC BLD AUTO-RTO: 0 /100 WBC (ref 0–0.2)
PH UR STRIP.AUTO: 6 [PH] (ref 5–8)
PLATELET # BLD AUTO: 441 10*3/MM3 (ref 140–450)
PMV BLD AUTO: 8 FL (ref 6–12)
POTASSIUM SERPL-SCNC: 3.9 MMOL/L (ref 3.5–5.2)
PROCALCITONIN SERPL-MCNC: 0.16 NG/ML (ref 0–0.25)
PROT SERPL-MCNC: 6.3 G/DL (ref 6–8.5)
PROT UR QL STRIP: NEGATIVE
PROTHROMBIN TIME: 14 SECONDS (ref 11.8–14.8)
RBC # BLD AUTO: 3.73 10*6/MM3 (ref 4.14–5.8)
RBC # UR STRIP: ABNORMAL /HPF
REF LAB TEST METHOD: ABNORMAL
SODIUM SERPL-SCNC: 136 MMOL/L (ref 136–145)
SP GR UR STRIP: >1.03 (ref 1–1.03)
SQUAMOUS #/AREA URNS HPF: ABNORMAL /HPF
UROBILINOGEN UR QL STRIP: ABNORMAL
WBC # UR STRIP: ABNORMAL /HPF
WBC NRBC COR # BLD AUTO: 10.22 10*3/MM3 (ref 3.4–10.8)

## 2024-09-04 PROCEDURE — 99285 EMERGENCY DEPT VISIT HI MDM: CPT

## 2024-09-04 PROCEDURE — G0299 HHS/HOSPICE OF RN EA 15 MIN: HCPCS

## 2024-09-04 PROCEDURE — 80053 COMPREHEN METABOLIC PANEL: CPT | Performed by: FAMILY MEDICINE

## 2024-09-04 PROCEDURE — 83690 ASSAY OF LIPASE: CPT | Performed by: FAMILY MEDICINE

## 2024-09-04 PROCEDURE — 83605 ASSAY OF LACTIC ACID: CPT | Performed by: FAMILY MEDICINE

## 2024-09-04 PROCEDURE — 85025 COMPLETE CBC W/AUTO DIFF WBC: CPT | Performed by: FAMILY MEDICINE

## 2024-09-04 PROCEDURE — 36415 COLL VENOUS BLD VENIPUNCTURE: CPT

## 2024-09-04 PROCEDURE — 74177 CT ABD & PELVIS W/CONTRAST: CPT

## 2024-09-04 PROCEDURE — 84145 PROCALCITONIN (PCT): CPT | Performed by: FAMILY MEDICINE

## 2024-09-04 PROCEDURE — 83735 ASSAY OF MAGNESIUM: CPT | Performed by: FAMILY MEDICINE

## 2024-09-04 PROCEDURE — 85730 THROMBOPLASTIN TIME PARTIAL: CPT | Performed by: FAMILY MEDICINE

## 2024-09-04 PROCEDURE — 81001 URINALYSIS AUTO W/SCOPE: CPT | Performed by: FAMILY MEDICINE

## 2024-09-04 PROCEDURE — 25510000001 IOPAMIDOL 61 % SOLUTION: Performed by: FAMILY MEDICINE

## 2024-09-04 PROCEDURE — 85610 PROTHROMBIN TIME: CPT | Performed by: FAMILY MEDICINE

## 2024-09-04 PROCEDURE — 96374 THER/PROPH/DIAG INJ IV PUSH: CPT

## 2024-09-04 RX ORDER — IOPAMIDOL 612 MG/ML
100 INJECTION, SOLUTION INTRAVASCULAR
Status: COMPLETED | OUTPATIENT
Start: 2024-09-04 | End: 2024-09-04

## 2024-09-04 RX ORDER — SODIUM CHLORIDE 0.9 % (FLUSH) 0.9 %
10 SYRINGE (ML) INJECTION AS NEEDED
Status: DISCONTINUED | OUTPATIENT
Start: 2024-09-04 | End: 2024-09-04 | Stop reason: HOSPADM

## 2024-09-04 RX ADMIN — IOPAMIDOL 100 ML: 612 INJECTION, SOLUTION INTRAVENOUS at 18:24

## 2024-09-04 RX ADMIN — HYDROMORPHONE HYDROCHLORIDE 1 MG: 1 INJECTION, SOLUTION INTRAMUSCULAR; INTRAVENOUS; SUBCUTANEOUS at 19:54

## 2024-09-04 NOTE — ED NOTES
Pt reports difficult IV stick.  Pt has PICC line for outpt abx therapy.  Ok to use PICC per Dr Bailey.

## 2024-09-04 NOTE — PROGRESS NOTES
Subjective   Chief Complaint   Patient presents with    Post-op Follow-up     Pt is here for follow up w/ CXR    Pt had chest tube insertion on 08/26/2024    Empyema Lung       Patient ID: Ravin Garza is a 64 y.o. male who is here for follow-up having had Insertion of indwelling tunneled pleural catheter with cuff by Dr. Manzanares on 8/26/2024    History of Present Illness  Mr. Garza is a 64-year-old male with a history of pneumonia and right-sided empyema.  He underwent pigtail catheter placement and intrapleural lytics but had continued space.  A large bore chest tube was placed by Dr. Manzanares in the operating room with the plan for patient to go home with long term chest tube as he did not wish to remain hospitalized for additional intrapleural tPA.  He was ultimately discharged home with right chest tube in place to Heimlich valve on 8/28/2024.  He is here today for routine follow-up with repeat CT chest with contrast.  He does continue to follow with infectious disease and is now on oral antibiotics.  He states he has an appointment next week to be evaluated for possible paracentesis.  He denies fevers, chills, or overall malaise.  He states he feels his appetite is improving however he states he cannot eat due to fullness in his abdomen.  Right chest tube has been draining very little.    The following portions of the patient's history were reviewed and updated as appropriate: allergies, current medications, past family history, past medical history, past social history, past surgical history and problem list.    Review of Systems   Constitutional:  Positive for fatigue. Negative for chills, diaphoresis and fever.   HENT:  Negative for trouble swallowing and voice change.    Eyes:  Negative for visual disturbance.   Respiratory:  Negative for chest tightness and shortness of breath.    Cardiovascular:  Negative for chest pain, palpitations and leg swelling.   Gastrointestinal:  Negative for abdominal pain,  "diarrhea, nausea and vomiting.   Genitourinary:  Negative for difficulty urinating, dysuria and hematuria.   Musculoskeletal:  Negative for arthralgias and myalgias.   Skin:  Negative for color change, pallor, rash and wound.   Neurological:  Negative for dizziness, syncope and light-headedness.   Hematological:  Does not bruise/bleed easily.   Psychiatric/Behavioral:  Negative for agitation, confusion and sleep disturbance.        Objective   Visit Vitals  /88 (BP Location: Left arm, Patient Position: Sitting, Cuff Size: Adult)   Pulse 93   Ht 193 cm (76\")   Wt 83.9 kg (185 lb)   SpO2 99%   BMI 22.52 kg/m²       Physical Exam  Vitals reviewed.   Constitutional:       General: He is not in acute distress.     Appearance: He is ill-appearing (chronically ill appearing).   HENT:      Head: Normocephalic.   Eyes:      Pupils: Pupils are equal, round, and reactive to light.   Cardiovascular:      Rate and Rhythm: Normal rate and regular rhythm.      Heart sounds: Normal heart sounds. No murmur heard.  Pulmonary:      Breath sounds: Normal breath sounds. No wheezing or rales.   Abdominal:      General: There is no distension.      Palpations: Abdomen is soft.      Tenderness: There is no abdominal tenderness.   Musculoskeletal:         General: No swelling or tenderness.   Skin:     General: Skin is warm and dry.      Comments: Right chest tube in stable position at the 9 cm pedrito and is to Heimlich valve which is broken today.  A large blood clot was also removed from chest tube.  A new Heimlich valve was placed and there is no air leak..  Suture is intact.   Neurological:      General: No focal deficit present.      Mental Status: He is alert and oriented to person, place, and time.   Psychiatric:         Mood and Affect: Mood normal.         Behavior: Behavior normal.         Thought Content: Thought content normal.         Judgment: Judgment normal.             Assessment & Plan         Independent Review of " Radiographic Studies:    CT Chest: Right chest tube in stable position with ongoing right pleural effusion with minimal improvement from most recent scan 2 weeks ago.    Diagnoses and all orders for this visit:    1. Empyema lung (Primary)  -     CT chest w contrast; Future    2. Severe malnutrition           Overall, Ravin Garza is doing well.  CT scan reviewed with Dr. Manzanares with little improvement in right-sided pleural effusion.  We discussed the importance of protein shakes TID and increase caloric intake in order to aid in healing.  Continue right chest tube to Heimlich valve and I have advised him to call me should the valve break and a new one will be provided.  He did have a blood clot occluding the right chest tube upon exam today and Heimlich valve was broken.  A new Heimlich valve was placed and blood clot was dislodged.  I have advised him to continue to monitor drainage from right chest tube closely as I am hoping this will increase over the next couple of weeks.  Plan on follow-up in 2 weeks with repeat CT chest with contrast to further evaluate empyema.  If no significant improvement at that time, we will discuss plan moving forward.  Provided support and encouragement. All questions have been answered to the best of my ability.  Patient has been instructed to contact our office with any questions or concerns should they arise prior to the next office visit.  Follow-up with me in 2 weeks with repeat CT chest with contrast.  Keep scheduled follow up with infectious disease for antibiotic management.    BMI is within normal parameters. No other follow-up for BMI required.      Ravin Garza  reports that he has been smoking cigarettes. He started smoking about 47 years ago. He has a 47.7 pack-year smoking history. He has been exposed to tobacco smoke. He has never used smokeless tobacco. I have educated him on the risk of diseases from using tobacco products such as cancer, COPD, and heart  disease.     I spent 3  minutes counseling the patient.    Advance Care Planning   ACP discussion was held with the patient during this visit. Patient does not have an advance directive, declines further assistance.

## 2024-09-04 NOTE — ED PROVIDER NOTES
Subjective   History of Present Illness  64-year-old male presents emergency room today with complaints of abdominal pain and swelling.  States been going on for the last couple weeks.  Has been worsening.  He states that he has no vomiting.  He has no diarrhea.  He did have some loose stools.  No fevers.  He recently was hospitalized for an infection in his chest where he does have a drain going.  He is not PICC line where he is on IV antibiotics.  Patient denies any other symptoms at this time.      Review of Systems   Gastrointestinal:  Positive for abdominal distention and abdominal pain.   All other systems reviewed and are negative.      Past Medical History:   Diagnosis Date    Amputee, above knee     bilateral    ETOH abuse        No Known Allergies    Past Surgical History:   Procedure Laterality Date    CHEST TUBE INSERTION Right 8/26/2024    Procedure: CHEST TUBE INSERTION;  Surgeon: Presley Manzanares MD;  Location: [facility];  Service: Cardiothoracic;  Laterality: Right;       No family history on file.    Social History     Socioeconomic History    Marital status: Legally    Tobacco Use    Smoking status: Every Day     Current packs/day: 1.00     Average packs/day: 1 pack/day for 45.1 years (45.1 ttl pk-yrs)     Types: Cigarettes     Start date: 8/16/1979    Smokeless tobacco: Never   Vaping Use    Vaping status: Never Used   Substance and Sexual Activity    Alcohol use: Not Currently    Drug use: Never    Sexual activity: Defer           Objective   Physical Exam  Vitals and nursing note reviewed.   Constitutional:       Appearance: He is well-developed.   HENT:      Head: Normocephalic and atraumatic.   Eyes:      General: No scleral icterus.     Extraocular Movements: Extraocular movements intact.      Pupils: Pupils are equal, round, and reactive to light.   Cardiovascular:      Rate and Rhythm: Normal rate and regular rhythm.      Heart sounds: Normal heart sounds.   Pulmonary:      Effort:  Pulmonary effort is normal.      Breath sounds: Normal breath sounds.   Abdominal:      General: Bowel sounds are normal. There is distension.      Palpations: Abdomen is soft.      Tenderness: There is generalized abdominal tenderness.      Hernia: A hernia is present. Hernia is present in the umbilical area.   Skin:     General: Skin is warm and dry.   Neurological:      General: No focal deficit present.      Mental Status: He is alert and oriented to person, place, and time.   Psychiatric:         Mood and Affect: Mood normal.         Behavior: Behavior normal.         Procedures           ED Course                                           Lab Results (last 24 hours)       Procedure Component Value Units Date/Time    Comprehensive Metabolic Panel [[id number]]  (Abnormal) Collected: 09/04/24 1721    Specimen: Blood Updated: 09/04/24 1755     Glucose 88 mg/dL      BUN 14 mg/dL      Creatinine 0.68 mg/dL      Sodium 136 mmol/L      Potassium 3.9 mmol/L      Chloride 103 mmol/L      CO2 22.0 mmol/L      Calcium 8.8 mg/dL      Total Protein 6.3 g/dL      Albumin 2.6 g/dL      ALT (SGPT) 9 U/L      AST (SGOT) 21 U/L      Alkaline Phosphatase 138 U/L      Total Bilirubin 0.2 mg/dL      Globulin 3.7 gm/dL      A/G Ratio 0.7 g/dL      BUN/Creatinine Ratio 20.6     Anion Gap 11.0 mmol/L      eGFR 103.2 mL/min/1.73     Narrative:      GFR Normal >60  Chronic Kidney Disease <60  Kidney Failure <15      Protime-INR [[id number]]  (Normal) Collected: 09/04/24 1721    Specimen: Blood Updated: 09/04/24 1742     Protime 14.0 Seconds      INR 1.04    aPTT [[id number]]  (Abnormal) Collected: 09/04/24 1721    Specimen: Blood Updated: 09/04/24 1742     PTT 22.9 seconds     Lipase [[id number]]  (Abnormal) Collected: 09/04/24 1721    Specimen: Blood Updated: 09/04/24 1750     Lipase 129 U/L     Lactic Acid, Plasma [[id number]]  (Normal) Collected: 09/04/24 1721    Specimen: Blood Updated: 09/04/24 1753     Lactate 0.8 mmol/L      "Procalcitonin [[id number]]  (Normal) Collected: 09/04/24 1721    Specimen: Blood Updated: 09/04/24 1801     Procalcitonin 0.16 ng/mL     Narrative:      As a Marker for Sepsis (Non-Neonates):    1. <0.5 ng/mL represents a low risk of severe sepsis and/or septic shock.  2. >2 ng/mL represents a high risk of severe sepsis and/or septic shock.    As a Marker for Lower Respiratory Tract Infections that require antibiotic therapy:    PCT on Admission    Antibiotic Therapy       6-12 Hrs later    >0.5                Strongly Recommended  >0.25 - <0.5        Recommended   0.1 - 0.25          Discouraged              Remeasure/reassess PCT  <0.1                Strongly Discouraged     Remeasure/reassess PCT    As 28 day mortality risk marker: \"Change in Procalcitonin Result\" (>80% or <=80%) if Day 0 (or Day 1) and Day 4 values are available. Refer to http://www.[employer]obiWorkpct-calculator.com    Change in PCT <=80%  A decrease of PCT levels below or equal to 80% defines a positive change in PCT test result representing a higher risk for 28-day all-cause mortality of patients diagnosed with severe sepsis for septic shock.    Change in PCT >80%  A decrease of PCT levels of more than 80% defines a negative change in PCT result representing a lower risk for 28-day all-cause mortality of patients diagnosed with severe sepsis or septic shock.       Magnesium [[id number]]  (Abnormal) Collected: 09/04/24 1721    Specimen: Blood Updated: 09/04/24 1755     Magnesium 1.3 mg/dL     CBC & Differential [[id number]]  (Abnormal) Collected: 09/04/24 1748    Specimen: Blood Updated: 09/04/24 1756    Narrative:      The following orders were created for panel order CBC & Differential.  Procedure                               Abnormality         Status                     ---------                               -----------         ------                     CBC Auto Differential[[id number]]        Abnormal            Final result                 Please " view results for these tests on the individual orders.    CBC Auto Differential [[id number]]  (Abnormal) Collected: 09/04/24 1748    Specimen: Blood Updated: 09/04/24 1756     WBC 10.22 10*3/mm3      RBC 3.73 10*6/mm3      Hemoglobin 8.7 g/dL      Hematocrit 30.2 %      MCV 81.0 fL      MCH 23.3 pg      MCHC 28.8 g/dL      RDW 28.5 %      RDW-SD 82.2 fl      MPV 8.0 fL      Platelets 441 10*3/mm3      Neutrophil % 80.1 %      Lymphocyte % 10.6 %      Monocyte % 5.6 %      Eosinophil % 2.2 %      Basophil % 0.9 %      Immature Grans % 0.6 %      Neutrophils, Absolute 8.20 10*3/mm3      Lymphocytes, Absolute 1.08 10*3/mm3      Monocytes, Absolute 0.57 10*3/mm3      Eosinophils, Absolute 0.22 10*3/mm3      Basophils, Absolute 0.09 10*3/mm3      Immature Grans, Absolute 0.06 10*3/mm3      nRBC 0.0 /100 WBC     Urinalysis With Culture If Indicated - Urine, Clean Catch [[id number]]  (Abnormal) Collected: 09/04/24 1906    Specimen: Urine, Clean Catch Updated: 09/04/24 1922     Color, UA Yellow     Appearance, UA Clear     pH, UA 6.0     Specific Gravity, UA >1.030     Glucose, UA Negative     Ketones, UA Negative     Bilirubin, UA Negative     Blood, UA Negative     Protein, UA Negative     Leuk Esterase, UA Trace     Nitrite, UA Negative     Urobilinogen, UA 0.2 E.U./dL    Narrative:      In absence of clinical symptoms, the presence of pyuria, bacteria, and/or nitrites on the urinalysis result does not correlate with infection.    Urinalysis, Microscopic Only - Urine, Clean Catch [[id number]]  (Abnormal) Collected: 09/04/24 1906    Specimen: Urine, Clean Catch Updated: 09/04/24 1922     RBC, UA 0-2 /HPF      WBC, UA 3-5 /HPF      Comment: Urine culture not indicated.        Bacteria, UA None Seen /HPF      Squamous Epithelial Cells, UA 0-2 /HPF      Hyaline Casts, UA 3-6 /LPF      Methodology Automated Microscopy          CT Abdomen Pelvis With Contrast   Final Result   1. Stable suspected empyema within the right lung base  with a persistent   air-fluid level. There is thickening of the visceral and parietal   pleura. A right-sided thoracostomy tube again traverses the collection   stable in position. No pneumothorax. There is right basilar atelectasis.   Previously noted left-sided effusion shows interval improvement with   only trace residual effusion and minimal left basilar atelectasis.   2. The liver is cirrhotic in morphology. There is again moderate ascites   throughout the abdomen and pelvis similar in distribution and appearance   to the previous exam. There is fluid within a periumbilical hernia as   well as a right inguinal hernia. There is a small bowel loop which   projects into the umbilical hernia but without evidence of obstruction   or incarceration.   3. Within the retroperitoneum there is soft tissue thickening   surrounding the superior mesenteric artery. The soft tissue prominence   is stable from the previous exam. There is attenuation of the SMA as it   traverses the soft tissue lesion. This is more prominent on the previous   exam but there is also some differences in technique from the prior   study with the prior study performed as a CT angiogram with denser   contrast administered. The soft tissue abuts the more inferior margin of   the celiac axis but the celiac artery is widely patent. The PRIMO is also   patent.   4. Normal bowel gas pattern with no obstruction or free air. No   significant small bowel or colonic distention present.   5. Mild body wall edema.   6. Cortical cysts right kidney. The lesion involving the lower pole of   the right kidney measures Hounsfield units of 25 and may represent a   hemorrhagic cyst. No obstructive uropathy.               This report was signed and finalized on 9/4/2024 7:02 PM by Dr. Jalen Mccarty MD.            Medications   sodium chloride 0.9 % flush 10 mL (has no administration in time range)   HYDROmorphone (DILAUDID) injection 1 mg (has no administration in  time range)   iopamidol (ISOVUE-300) 61 % injection 100 mL (100 mL Intravenous Given 9/4/24 8242)         Medical Decision Making  I had a discussion with the patient/family regarding diagnosis, diagnostic results, treatment plan, and medications.  The patient/family indicated understanding of these instructions.  I spent adequate time at the bedside prior to discharge necessary to discuss the aftercare instructions, giving patient education, providing explanations of the results of our evaluations/findings, and my decision making to assure that the patient/family understand the plan of care.  Time was allotted to answer questions at that time and throughout the ED course.  Emphasis was placed on timely follow-up after discharge.  I also discussed the potential for the development of an acute emergent condition requiring further evaluation, return to the ER, admission, or even surgical intervention. I discussed that we found nothing during the visit today indicating the need for further ER workup at this time, admission to the hospital, or the presence of an acute unstable medical condition.  I encouraged the patient to return to the emergency department immediately for ANY concerns, worsening, new complaints, or if symptoms persist and unable to seek follow-up in a timely fashion.  The patient/family expressed understanding and agreement with this plan.      Problems Addressed:  Abdominal pain, unspecified abdominal location: complicated acute illness or injury  Other ascites: complicated acute illness or injury    Amount and/or Complexity of Data Reviewed  Labs: ordered. Decision-making details documented in ED Course.  Radiology: ordered. Decision-making details documented in ED Course.    Risk  Prescription drug management.        Final diagnoses:   Abdominal pain, unspecified abdominal location   Other ascites       ED Disposition  ED Disposition       ED Disposition   Discharge    Condition   Stable    Comment    --               Rk Nelson MD  [address] [facility] [address]  [phone]    Schedule an appointment as soon as possible for a visit       Zen Garcia MD  [address]  [facility] [id number] [phone]    Schedule an appointment as soon as possible for a visit       [facility] EMERGENCY DEPARTMENT  [address] [address] [phone]    As needed, If symptoms worsen         Medication List      No changes were made to your prescriptions during this visit.            Fuad Bailey MD  09/04/24 5550

## 2024-09-05 ENCOUNTER — HOME CARE VISIT (OUTPATIENT)
Dept: HOME HEALTH SERVICES | Facility: CLINIC | Age: 64
End: 2024-09-05
Payer: MEDICARE

## 2024-09-05 VITALS
TEMPERATURE: 97.6 F | DIASTOLIC BLOOD PRESSURE: 68 MMHG | OXYGEN SATURATION: 94 % | HEART RATE: 87 BPM | RESPIRATION RATE: 18 BRPM | SYSTOLIC BLOOD PRESSURE: 102 MMHG

## 2024-09-05 PROCEDURE — G0299 HHS/HOSPICE OF RN EA 15 MIN: HCPCS

## 2024-09-06 ENCOUNTER — HOME CARE VISIT (OUTPATIENT)
Dept: HOME HEALTH SERVICES | Facility: CLINIC | Age: 64
End: 2024-09-06
Payer: MEDICARE

## 2024-09-06 VITALS
RESPIRATION RATE: 18 BRPM | TEMPERATURE: 98 F | DIASTOLIC BLOOD PRESSURE: 64 MMHG | OXYGEN SATURATION: 99 % | HEART RATE: 84 BPM | SYSTOLIC BLOOD PRESSURE: 100 MMHG

## 2024-09-06 VITALS
SYSTOLIC BLOOD PRESSURE: 100 MMHG | TEMPERATURE: 98 F | HEART RATE: 84 BPM | OXYGEN SATURATION: 99 % | RESPIRATION RATE: 18 BRPM | DIASTOLIC BLOOD PRESSURE: 64 MMHG

## 2024-09-06 PROCEDURE — G0299 HHS/HOSPICE OF RN EA 15 MIN: HCPCS

## 2024-09-06 PROCEDURE — G0157 HHC PT ASSISTANT EA 15: HCPCS

## 2024-09-07 ENCOUNTER — HOME CARE VISIT (OUTPATIENT)
Dept: HOME HEALTH SERVICES | Facility: CLINIC | Age: 64
End: 2024-09-07
Payer: MEDICARE

## 2024-09-07 VITALS
SYSTOLIC BLOOD PRESSURE: 115 MMHG | HEART RATE: 96 BPM | OXYGEN SATURATION: 92 % | DIASTOLIC BLOOD PRESSURE: 75 MMHG | RESPIRATION RATE: 18 BRPM | TEMPERATURE: 97.8 F

## 2024-09-07 PROCEDURE — G0299 HHS/HOSPICE OF RN EA 15 MIN: HCPCS

## 2024-09-07 NOTE — HOME HEALTH
FOCUS OF CARE/SKILLED NEED;      TEACHING/INTERVENTIONS    PROGRESS TOWARD GOALS    PHYSICIAN CONTACT    INSURANCE CHANGES    FALLS SINCE LAST VISIT    MEDICATION CHANGES SINCE LAST VISIT    PLANS FOR NEXT VISIT    PRESCREEN FOR COVID: yes no s/s covid no recent exposure

## 2024-09-08 ENCOUNTER — HOME CARE VISIT (OUTPATIENT)
Dept: HOME HEALTH SERVICES | Facility: CLINIC | Age: 64
End: 2024-09-08
Payer: MEDICARE

## 2024-09-08 NOTE — CASE COMMUNICATION
Patient missed a skilled nurse visit from [facility] on 09-08-2-24.     Reason: Pt had not received new shipment of antibiotics.  SN called infusion company and spoke with pharmacist who found that meds had been shipped but [employer] had not delivered them.  Pt to receive new shipment on Monday 9-9.  Pt will miss two doses, pharmacist to follow up with MD about missed doses.       For your records only.   Per CMS Guidance, MD must be  notified of missed/cancelled visits; therefore the prescribed frequency was not met.

## 2024-09-09 ENCOUNTER — HOME CARE VISIT (OUTPATIENT)
Dept: HOME HEALTH SERVICES | Facility: CLINIC | Age: 64
End: 2024-09-09
Payer: MEDICARE

## 2024-09-09 ENCOUNTER — TELEPHONE (OUTPATIENT)
Age: 64
End: 2024-09-09
Payer: MEDICARE

## 2024-09-09 VITALS
OXYGEN SATURATION: 95 % | TEMPERATURE: 97.5 F | DIASTOLIC BLOOD PRESSURE: 68 MMHG | RESPIRATION RATE: 18 BRPM | HEART RATE: 97 BPM | SYSTOLIC BLOOD PRESSURE: 110 MMHG

## 2024-09-09 PROCEDURE — G0299 HHS/HOSPICE OF RN EA 15 MIN: HCPCS

## 2024-09-09 NOTE — TELEPHONE ENCOUNTER
I spoke with Marcelina with [facility].  Patient called them on Saturday stating he was out of medication.  They came in on Saturday and shipped him 2 more doses of IV ceftriaxone, but he didn't receive the shipment via [employer] until today.  His stop date was supposed to be today 9/9/24.  She asked if Dr. Zamora would like patient to give the 2 missed doses today and tomorrow.  I checked with Dr. Zamora.  He was OK with patient giving a dose today and a dose tomorrow, then he can be finished.  I informed Marcelina.  She will relay this information to the patient.       Dr. Zamora was updated about this phone call.  He ordered cefdinir 300 MG PO every 12 hours for 14 days for him to begin on Wednesday.  Have him keep his follow-up scheduled on 9/11/24.      I called [facility] and informed Jemima that patient will give a dose of ceftriaxone today and tomorrow.  Please have his nurse DC his PICC following his last dose.  No further orders per ID once PICC line is DC'd.  Jemima repeated and verified orders.      I called patient to give updates, instructions, and to confirm his pharmacy.  He didn't answer.  I left a voicemail asking him to please return my call.      Jemima called back and said she spoke with his nurse.  The tubing is compromised on the dose for tomorrow.  I told her OK to go ahead and DC PICC line today or tomorrow.

## 2024-09-10 ENCOUNTER — HOME CARE VISIT (OUTPATIENT)
Dept: HOME HEALTH SERVICES | Facility: CLINIC | Age: 64
End: 2024-09-10
Payer: MEDICARE

## 2024-09-10 ENCOUNTER — READMISSION MANAGEMENT (OUTPATIENT)
Dept: CALL CENTER | Facility: HOSPITAL | Age: 64
End: 2024-09-10
Payer: MEDICARE

## 2024-09-10 ENCOUNTER — TELEPHONE (OUTPATIENT)
Age: 64
End: 2024-09-10
Payer: MEDICARE

## 2024-09-10 VITALS
HEART RATE: 93 BPM | OXYGEN SATURATION: 96 % | TEMPERATURE: 97.4 F | DIASTOLIC BLOOD PRESSURE: 88 MMHG | RESPIRATION RATE: 18 BRPM | SYSTOLIC BLOOD PRESSURE: 118 MMHG

## 2024-09-10 DIAGNOSIS — J86.9 EMPYEMA OF LUNG: Primary | ICD-10-CM

## 2024-09-10 PROCEDURE — G0299 HHS/HOSPICE OF RN EA 15 MIN: HCPCS

## 2024-09-10 PROCEDURE — G0157 HHC PT ASSISTANT EA 15: HCPCS

## 2024-09-10 RX ORDER — CEFDINIR 300 MG/1
300 CAPSULE ORAL 2 TIMES DAILY
Qty: 28 CAPSULE | Refills: 0 | Status: SHIPPED | OUTPATIENT
Start: 2024-09-10 | End: 2024-09-24

## 2024-09-10 NOTE — PROGRESS NOTES
[facility] [place]    Patient:  Ravin Garza  YOB: 1960  MRN: [id number]   Primary Care Physician: Rk Nelson MD  Referring Physician: Matthias Mcmahon MD     Chief Complaint: right chest empyema-Streptococcus mitis/oralis   >>> swelling in abdomen, constant pain<<<<<    Interval History/HPI: He returns today for follow-up.  I saw him in consultation at [facility].  He had right chest empyema Streptococcus mitis/oralis.  He completed IV ceftriaxone a few days ago as an outpatient.  His PICC line was removed.  I had started him on Omnicef therapy.  Patient is main ongoing symptom at present is abdominal distention from ascites.  He has not had any paracentesis.  He has not been taking his diuretic treatment.  He really was not sure that it was helping.  His sister indicated that she has been asked by him to get him alcohol.  She wanted clarification from me as to whether that would be appropriate.  She did not think so and has not brought him any but she wanted to make sure.  I spoke directly to him.  I explained his underlying problems with his liver resulting in cirrhosis and scarring.  I explained that is not reversible but would be hastened dramatically with ongoing alcohol consumption.  I strongly recommended no alcohol consumption.  He indicated that he understood the recommendation.  He also indicated he plans to continue to try to access alcohol and he plans to continue drinking alcohol.  He did not report any issues with chest tube that remains in place.    Allergies:   Allergies   Allergen Reactions    Codeine Hives, Swelling, Anxiety and Irritability       Current Scheduled Medications:   Current Outpatient Medications on File Prior to Visit   Medication Sig    calcium carbonate (TUMS) 500 MG chewable tablet Chew 2 tablets 3 (Three) Times a Day As Needed for Indigestion or Heartburn.    EPINEPHrine (EPIPEN) 0.3 MG/0.3ML solution auto-injector  injection Inject 0.3 mL into the appropriate muscle as directed by prescriber 1 (One) Time. Indications: Life-Threatening Hypersensitivity Reaction    famotidine (PEPCID) 20 MG tablet Take 1 tablet by mouth 2 (Two) Times a Day As Needed for Heartburn. (Patient not taking: Reported on 9/12/2024)    ferrous sulfate 325 (65 FE) MG tablet Take 1 tablet by mouth Daily With Breakfast.    HYDROcodone-acetaminophen (Norco) 5-325 MG per tablet Take 1 tablet by mouth Every 6 (Six) Hours As Needed for Moderate Pain or Severe Pain. (Patient not taking: Reported on 9/12/2024)    nicotine (NICODERM CQ) 21 MG/24HR patch Place 1 patch on the skin as directed by provider Daily. (Patient not taking: Reported on 9/12/2024)    cefdinir (OMNICEF) 300 MG capsule Take 1 capsule by mouth 2 (Two) Times a Day for 14 days. (Patient taking differently: Take 1 capsule by mouth 2 (Two) Times a Day.)    cloNIDine (CATAPRES) 0.1 MG tablet Take 1 tablet by mouth Take As Directed. (Patient not taking: Reported on 9/11/2024)    furosemide (LASIX) 20 MG tablet Take 1 tablet by mouth Daily. (Patient taking differently: Take 1 tablet by mouth Daily.)    meloxicam (MOBIC) 7.5 MG tablet Take 1 tablet by mouth Daily As Needed. (Patient not taking: Reported on 9/11/2024)    Multi-Vitamin tablet tablet Take 1 tablet by mouth Daily.    spironolactone (ALDACTONE) 25 MG tablet Take 1 tablet by mouth Daily. (Patient taking differently: Take 1 tablet by mouth Daily.)     No current facility-administered medications on file prior to visit.      Venous Access Review  Line/IV site: No current IV Access    Antimicrobial Review  Currently on antibiotics/antifungals: No  Start Date of Therapy: 08- - 09-: IV ceftriaxone                   cefdinir 300 MG PO every 12 hours (He was supposed to start cefdinir yesterday - He has NOT started)  If therapy completed, date complete: 09- (ceftriaxone)  Estimated end of treatment date (EOT): 14-days after  "start (oral cefdinir)    Review of Systems See HPI.    Vital Signs:  /81 (BP Location: Right arm, Patient Position: Sitting, Cuff Size: Adult)   Pulse 109   Temp 97.3 °F (36.3 °C) (Temporal)   Ht 193 cm (76\")   Wt 81.6 kg (180 lb)   SpO2 99%   BMI 21.91 kg/m²     Physical Exam  Vital signs - reviewed.  Alert, pleasant, no distress  Lungs without crackles or wheezes  Abdomen with minimal tenderness  No rebound  He has moderate to severe abdominal distention consistent with ascites.  During his hospitalization he had been on diuretic treatment and near the end his abdomen was a little bit softer than initially.    Lab/Imaging/Other Information:     LABORATORY - SCAN - CMP, CRP, CBC W/DIFF - [facility] - 9/9/24 (09/09/2024)         Impression & Recommendations:   Diagnoses and all orders for this visit:    1. Empyema of lung (Primary)    2. Ascites due to alcoholic cirrhosis    From Empi standpoint he seems to be doing well  Planning additional 2 weeks of Omnicef  His cirrhosis is thought to be secondary to alcohol.  Strongly recommended no alcohol consumption.  Encouraged him to follow-up with his primary care physician (Dr. Nelson) locally regarding his ascites.  I continue to feel he would benefit from outpatient paracentesis.  Feel at least 1 paracentesis would be important to make sure the parameters of his ascitic fluid are outlined and to make sure there is no other etiology to pursue other than cirrhosis.  I also encouraged him to resume the diuretic treatment that had been recommended to him at discharge from the hospital.  Encouraged him to keep follow-up in the near future with his primary care physician he will also keep his follow-up with thoracic surgery.    Follow Up:   There are no Patient Instructions on file for this visit.  Return in about 1 month (around 10/11/2024).  Patient was provided After Visit Summary.     Mickey Zamora MD      CC: Rk Nelson MD   "

## 2024-09-10 NOTE — TELEPHONE ENCOUNTER
I called patient to give updates, instructions, and to confirm his pharmacy. He didn't answer. I did not leave another voicemail.      I called patient's sister Vidhi.  She said to send oral antibiotic to [facility] in [place].  She is aware of his appt tomorrow.

## 2024-09-10 NOTE — OUTREACH NOTE
Medical Week 2 Survey      Flowsheet Row Responses   [facility] facility patient discharged from? [facility]   Does the patient have one of the following disease processes/diagnoses(primary or secondary)? Other   Week 2 attempt successful? No   Unsuccessful attempts Attempt 1            Mae CORNELL - Registered Nurse

## 2024-09-11 ENCOUNTER — OFFICE VISIT (OUTPATIENT)
Age: 64
End: 2024-09-11
Payer: MEDICARE

## 2024-09-11 VITALS
OXYGEN SATURATION: 96 % | TEMPERATURE: 97.4 F | SYSTOLIC BLOOD PRESSURE: 118 MMHG | DIASTOLIC BLOOD PRESSURE: 88 MMHG | HEART RATE: 93 BPM | RESPIRATION RATE: 18 BRPM

## 2024-09-11 VITALS
BODY MASS INDEX: 21.92 KG/M2 | SYSTOLIC BLOOD PRESSURE: 112 MMHG | HEIGHT: 76 IN | DIASTOLIC BLOOD PRESSURE: 81 MMHG | HEART RATE: 109 BPM | TEMPERATURE: 97.3 F | WEIGHT: 180 LBS | OXYGEN SATURATION: 99 %

## 2024-09-11 DIAGNOSIS — J86.9 EMPYEMA OF LUNG: Primary | ICD-10-CM

## 2024-09-11 DIAGNOSIS — K70.31 ASCITES DUE TO ALCOHOLIC CIRRHOSIS: ICD-10-CM

## 2024-09-11 PROCEDURE — 1160F RVW MEDS BY RX/DR IN RCRD: CPT | Performed by: INTERNAL MEDICINE

## 2024-09-11 PROCEDURE — 99214 OFFICE O/P EST MOD 30 MIN: CPT | Performed by: INTERNAL MEDICINE

## 2024-09-11 PROCEDURE — 1159F MED LIST DOCD IN RCRD: CPT | Performed by: INTERNAL MEDICINE

## 2024-09-12 ENCOUNTER — HOME CARE VISIT (OUTPATIENT)
Dept: HOME HEALTH SERVICES | Facility: CLINIC | Age: 64
End: 2024-09-12
Payer: MEDICARE

## 2024-09-12 ENCOUNTER — OFFICE VISIT (OUTPATIENT)
Dept: CARDIAC SURGERY | Facility: CLINIC | Age: 64
End: 2024-09-12
Payer: MEDICARE

## 2024-09-12 ENCOUNTER — HOSPITAL ENCOUNTER (OUTPATIENT)
Dept: CT IMAGING | Facility: HOSPITAL | Age: 64
Discharge: HOME | End: 2024-09-12
Admitting: NURSE PRACTITIONER
Payer: MEDICARE

## 2024-09-12 VITALS
WEIGHT: 185 LBS | BODY MASS INDEX: 22.53 KG/M2 | SYSTOLIC BLOOD PRESSURE: 128 MMHG | DIASTOLIC BLOOD PRESSURE: 88 MMHG | HEIGHT: 76 IN | HEART RATE: 93 BPM | OXYGEN SATURATION: 99 %

## 2024-09-12 DIAGNOSIS — E43 SEVERE MALNUTRITION: ICD-10-CM

## 2024-09-12 DIAGNOSIS — J86.9 EMPYEMA LUNG: Primary | ICD-10-CM

## 2024-09-12 DIAGNOSIS — J86.9 EMPYEMA LUNG: ICD-10-CM

## 2024-09-12 PROCEDURE — 99214 OFFICE O/P EST MOD 30 MIN: CPT | Performed by: NURSE PRACTITIONER

## 2024-09-12 PROCEDURE — 1160F RVW MEDS BY RX/DR IN RCRD: CPT | Performed by: NURSE PRACTITIONER

## 2024-09-12 PROCEDURE — 71260 CT THORAX DX C+: CPT

## 2024-09-12 PROCEDURE — 1159F MED LIST DOCD IN RCRD: CPT | Performed by: NURSE PRACTITIONER

## 2024-09-12 PROCEDURE — 25510000001 IOPAMIDOL 61 % SOLUTION: Performed by: NURSE PRACTITIONER

## 2024-09-12 RX ORDER — IOPAMIDOL 612 MG/ML
100 INJECTION, SOLUTION INTRAVASCULAR
Status: COMPLETED | OUTPATIENT
Start: 2024-09-12 | End: 2024-09-12

## 2024-09-12 RX ADMIN — IOPAMIDOL 100 ML: 612 INJECTION, SOLUTION INTRAVENOUS at 10:58

## 2024-09-13 ENCOUNTER — TELEPHONE (OUTPATIENT)
Dept: CARDIAC SURGERY | Facility: CLINIC | Age: 64
End: 2024-09-13

## 2024-09-13 NOTE — TELEPHONE ENCOUNTER
LM for pt's sister (Vidhi) to return call re: upcoming appts.     Sept 26: Patient needs to arrive 0715 at [facility] for CT Chest and then OV with Hilary at 0830.

## 2024-09-17 NOTE — TELEPHONE ENCOUNTER
LM for patient to return call re: appt date/time    LM for pt's sister to return call re: appt date/time.     Mailed appt reminders

## 2024-09-18 ENCOUNTER — READMISSION MANAGEMENT (OUTPATIENT)
Dept: CALL CENTER | Facility: HOSPITAL | Age: 64
End: 2024-09-18
Payer: MEDICARE

## 2024-09-18 ENCOUNTER — PATIENT ROUNDING (BHMG ONLY) (OUTPATIENT)
Age: 64
End: 2024-09-18
Payer: MEDICARE

## 2024-09-19 ENCOUNTER — HOME CARE VISIT (OUTPATIENT)
Dept: HOME HEALTH SERVICES | Facility: CLINIC | Age: 64
End: 2024-09-19
Payer: MEDICARE

## 2024-09-19 VITALS
OXYGEN SATURATION: 93 % | DIASTOLIC BLOOD PRESSURE: 60 MMHG | TEMPERATURE: 98.1 F | SYSTOLIC BLOOD PRESSURE: 100 MMHG | RESPIRATION RATE: 18 BRPM | HEART RATE: 67 BPM

## 2024-09-19 PROCEDURE — G0299 HHS/HOSPICE OF RN EA 15 MIN: HCPCS

## 2024-09-26 ENCOUNTER — TELEPHONE (OUTPATIENT)
Dept: CARDIAC SURGERY | Facility: CLINIC | Age: 64
End: 2024-09-26
Payer: MEDICARE

## 2024-10-02 NOTE — PROGRESS NOTES
Subjective   Chief Complaint   Patient presents with    Empyema     Patient is here for follow up w/CT        Patient ID: Ravin Garza is a 64 y.o. male who is here for follow-up having had Insertion of indwelling tunneled pleural catheter with cuff by Dr. Manzanares on 8/26/2024    History of Present Illness  Mr. Garza is a 64-year-old male with a history of pneumonia and right-sided empyema.  He underwent pigtail catheter placement and intrapleural lytics but had continued space.  A large bore chest tube was placed by Dr. Manzanares in the operating room with the plan for patient to go home with long term chest tube as he did not wish to remain hospitalized for additional intrapleural tPA.  He was ultimately discharged home with right chest tube in place to Heimlich valve on 8/28/2024.  He was scheduled to see me last week in follow-up but was unable to keep this appointment.  He is here today for routine follow-up with repeat CT chest with contrast.  He does continue to follow with infectious disease. He denies fevers, chills, or overall malaise.  His main complaint today is abdominal pain and he is following with his primary care provider regarding this.  He reports the Heimlich valve has been broken for the past 2 days.  He did have good output from chest tube prior to the Heimlich valve breaking.    The following portions of the patient's history were reviewed and updated as appropriate: allergies, current medications, past family history, past medical history, past social history, past surgical history and problem list.    Review of Systems   Constitutional:  Positive for fatigue. Negative for chills, diaphoresis and fever.   HENT:  Negative for trouble swallowing and voice change.    Eyes:  Negative for visual disturbance.   Respiratory:  Negative for chest tightness and shortness of breath.    Cardiovascular:  Negative for chest pain, palpitations and leg swelling.   Gastrointestinal:  Negative for abdominal  "pain, diarrhea, nausea and vomiting.   Genitourinary:  Negative for difficulty urinating, dysuria and hematuria.   Musculoskeletal:  Negative for arthralgias and myalgias.   Skin:  Negative for color change, pallor, rash and wound.   Neurological:  Negative for dizziness, syncope and light-headedness.   Hematological:  Does not bruise/bleed easily.   Psychiatric/Behavioral:  Negative for agitation, confusion and sleep disturbance.        Objective   Visit Vitals  /63 (BP Location: Right arm, Patient Position: Sitting, Cuff Size: Adult)   Pulse 56   Ht 193 cm (76\")   Wt 81.6 kg (180 lb) Comment: pt reported   SpO2 92%   BMI 21.91 kg/m²         Physical Exam  Vitals reviewed.   Constitutional:       General: He is not in acute distress.     Appearance: He is ill-appearing (chronically ill appearing).   HENT:      Head: Normocephalic.   Eyes:      Pupils: Pupils are equal, round, and reactive to light.   Cardiovascular:      Rate and Rhythm: Normal rate and regular rhythm.      Heart sounds: Normal heart sounds. No murmur heard.  Pulmonary:      Breath sounds: Normal breath sounds. No wheezing or rales.   Abdominal:      General: There is no distension.      Palpations: Abdomen is soft.      Tenderness: There is no abdominal tenderness.   Musculoskeletal:         General: No swelling or tenderness.   Skin:     General: Skin is warm and dry.      Comments: Right chest tube in stable position at the 9 cm pedrito and is to Heimlich valve which is again broken today.  A new Heimlich valve was placed and there is no air leak..  Suture is intact.   Neurological:      General: No focal deficit present.      Mental Status: He is alert and oriented to person, place, and time.   Psychiatric:         Mood and Affect: Mood normal.         Behavior: Behavior normal.         Thought Content: Thought content normal.         Judgment: Judgment normal.             Assessment & Plan   CT chest with contrast        Independent Review " of Radiographic Studies:    CT Chest: Right chest tube in stable position with ongoing right pleural effusion that is somewhat improved from most recent CT scan of the chest.    Diagnoses and all orders for this visit:    1. Empyema lung (Primary)  -     CT chest w contrast; Future    2. Severe malnutrition           Overall, Ravin Garza is doing well.  CT scan reviewed with Dr. Manzanares with some improvement in right-sided pleural effusion noted.  Right chest tube in good position.  Unfortunately, not able to pull chest tube back as the port hole is near the chest wall.  Will continue right chest tube drainage to Heimlich valve and plan on 2-week follow-up with repeat CT scan of the chest at that time.  We again discussed the importance of protein shakes TID and increase caloric intake in order to aid in healing.  Continue right chest tube to Heimlich valve and I have advised him to call me should the valve break and a new one will be provided.  Provided support and encouragement. All questions have been answered to the best of my ability.  Patient has been instructed to contact our office with any questions or concerns should they arise prior to the next office visit.  Follow-up with me in 2 weeks with repeat CT chest with contrast.  Keep scheduled follow up with infectious disease.    BMI is within normal parameters. No other follow-up for BMI required.      Ravin Garza  reports that he has been smoking cigarettes. He started smoking about 47 years ago. He has a 47.8 pack-year smoking history. He has been exposed to tobacco smoke. He has never used smokeless tobacco. I have educated him on the risk of diseases from using tobacco products such as cancer, COPD, and heart disease.     I spent 3  minutes counseling the patient.    Advance Care Planning   ACP discussion was held with the patient during this visit. Patient does not have an advance directive, declines further assistance.

## 2024-10-03 ENCOUNTER — HOSPITAL ENCOUNTER (OUTPATIENT)
Dept: CT IMAGING | Facility: HOSPITAL | Age: 64
Discharge: HOME | End: 2024-10-03
Admitting: NURSE PRACTITIONER
Payer: MEDICARE

## 2024-10-03 ENCOUNTER — OFFICE VISIT (OUTPATIENT)
Dept: CARDIAC SURGERY | Facility: CLINIC | Age: 64
End: 2024-10-03
Payer: MEDICARE

## 2024-10-03 VITALS
SYSTOLIC BLOOD PRESSURE: 106 MMHG | HEIGHT: 76 IN | HEART RATE: 56 BPM | OXYGEN SATURATION: 92 % | BODY MASS INDEX: 21.92 KG/M2 | DIASTOLIC BLOOD PRESSURE: 63 MMHG | WEIGHT: 180 LBS

## 2024-10-03 DIAGNOSIS — J86.9 EMPYEMA LUNG: Primary | ICD-10-CM

## 2024-10-03 DIAGNOSIS — J86.9 EMPYEMA LUNG: ICD-10-CM

## 2024-10-03 DIAGNOSIS — E43 SEVERE MALNUTRITION: ICD-10-CM

## 2024-10-03 PROCEDURE — 1160F RVW MEDS BY RX/DR IN RCRD: CPT | Performed by: NURSE PRACTITIONER

## 2024-10-03 PROCEDURE — 99214 OFFICE O/P EST MOD 30 MIN: CPT | Performed by: NURSE PRACTITIONER

## 2024-10-03 PROCEDURE — 25510000001 IOPAMIDOL 61 % SOLUTION: Performed by: NURSE PRACTITIONER

## 2024-10-03 PROCEDURE — 1159F MED LIST DOCD IN RCRD: CPT | Performed by: NURSE PRACTITIONER

## 2024-10-03 PROCEDURE — 71260 CT THORAX DX C+: CPT

## 2024-10-03 RX ORDER — IOPAMIDOL 612 MG/ML
100 INJECTION, SOLUTION INTRAVASCULAR
Status: COMPLETED | OUTPATIENT
Start: 2024-10-03 | End: 2024-10-03

## 2024-10-03 RX ADMIN — IOPAMIDOL 100 ML: 612 INJECTION, SOLUTION INTRAVENOUS at 09:38

## 2024-10-04 NOTE — PROGRESS NOTES
Can we make sure that his primary care has a copy of this report.  He has been seeing them for his abdominal pain and may have already underwent an MRI however I just want them to be aware of this report.

## 2024-10-04 NOTE — PROGRESS NOTES
[facility] [place] Infectious Diseases Progress Note    Patient:  Ravin Garza  YOB: 1960  MRN: [id number]   Primary Care Physician: Rk Nelson MD  Referring Physician: Matthias Mcmahon MD     Chief Complaint: right chest empyema-Streptococcus mitis/oralis with complaints of abdominal pain from the extra fluid    Interval History/HPI: Here today for follow-up.  He had had right chest empyema.  He has also had ascites.  He indicates he had his ascites tapped once with 5 L removed at [facility].  He has an appointment with gastroenterology (Dr. Garcia) on Tuesday of next week.  He has a follow-up with thoracic surgery hopefully for chest tube removal on Thursday of next week.  He has been off oral antibiotic treatment for the past 2 to 3 weeks.  He is not experiencing any increasing cough, sputum production, fevers, or chills.  His main symptom remains some generalized abdominal discomfort due to large amount of ascites.  They indicate he had an ER visit this past weekend for the ascites and he had a low serum sodium and for that reason the ER suggested he stop his Lasix and spironolactone.  He had had a DUI this past summer.  He has not had any alcohol consumption per [facility]  They indicate he has some court ordered counseling (6 sessions).  He has been able to attend 1 session.  Due to his ongoing medical illness (recent empyema, chest tube, ascites, electrolyte disturbances, fatigue) he has not been able to keep his additional appointments to complete counseling.  He requested a letter to delay his completion of counseling until some of his other medical problems could be further addressed.    Allergies:   Allergies   Allergen Reactions    Codeine Hives, Swelling, Anxiety and Irritability     Current Scheduled Medications:   Current Outpatient Medications on File Prior to Visit   Medication Sig    calcium carbonate (TUMS) 500 MG chewable tablet Chew 2  "tablets 3 (Three) Times a Day As Needed for Indigestion or Heartburn.    ferrous sulfate 325 (65 FE) MG tablet Take 1 tablet by mouth Daily With Breakfast.    EPINEPHrine (EPIPEN) 0.3 MG/0.3ML solution auto-injector injection Inject 0.3 mL into the appropriate muscle as directed by prescriber 1 (One) Time. Indications: Life-Threatening Hypersensitivity Reaction (Patient not taking: Reported on 10/9/2024)    furosemide (LASIX) 20 MG tablet Take 1 tablet by mouth Daily. (Patient not taking: Reported on 10/9/2024)    meloxicam (MOBIC) 7.5 MG tablet Take 1 tablet by mouth Daily As Needed. (Patient not taking: Reported on 9/11/2024)    Multi-Vitamin tablet tablet Take 1 tablet by mouth Daily. (Patient not taking: Reported on 10/9/2024)    nicotine (NICODERM CQ) 21 MG/24HR patch Place 1 patch on the skin as directed by provider Daily. (Patient not taking: Reported on 9/12/2024)    spironolactone (ALDACTONE) 25 MG tablet Take 1 tablet by mouth Daily. (Patient not taking: Reported on 10/9/2024)    sucralfate (CARAFATE) 1 g tablet TAKE 1 TABLET BY MOUTH 4 TIMES DAILY 1 HOUR BEFORE MEALS AND AT BEDTIME     No current facility-administered medications on file prior to visit.      Venous Access Review  Line/IV site: No current IV Access    Antimicrobial Review  Currently on antibiotics/antifungals: YES/NO: NO  Start Date of Therapy: 08- - 09-: IV ceftriaxone                                       09- - 09-: cefdinir 300 MG PO every 12 hours   If therapy completed, date complete: 09-       Review of Systems See HPI.    Vital Signs:  /80   Pulse 103   Temp 97.3 °F (36.3 °C) (Temporal)   Ht 193 cm (76\")   Wt 81.6 kg (180 lb)   SpO2 98%   BMI 21.91 kg/m²     Physical Exam  Vital signs - reviewed.  Abdomen distended consistent with ascites.  No peritoneal signs.  Lungs with fairly good airflow in both lungs.  There were no crackles or wheezes.    Lab/Imaging/Other Information: " "  LABORATORY - SCAN - LAB FLOW SHEET - ID - 9/11/24 (09/11/2024)   Progress Notes by Hilary Doan APRN (10/03/2024 11:00)       CT Chest With Contrast Diagnostic (10/03/2024 09:38)   IMPRESSION:  1. Similar pulmonary findings compared to 9/12/2024 including pleural  fluid, chest tube, pulmonary nodules. The previously cavitary RIGHT  lower lobe opacity now appears linear and atelectatic, slightly smaller  than on 9/12/2024.     2. Possible RIGHT posterior 1.7 cm vaguely arterially enhancing liver  lesion, not optimally evaluated on this exam. Recommend MRI abdomen  without and with contrast when clinically appropriate. In the setting of  cirrhotic appearing liver with ascites, hepatocellular carcinoma or  dysplastic nodule are considerations.     3. Gallstones.     Exam was tagged in PACS with \"incidental findings\" to assist in  appropriate follow up.      This report was signed and finalized on 10/3/2024 2:47 PM by Dr Kelly Delong MD.  Impression & Recommendations:   Diagnoses and all orders for this visit:    1. Empyema of lung (Primary)    2. Ascites due to alcoholic cirrhosis    He does not seem to be manifesting any worsening symptoms off antibiotic treatment.  Duration to keep his follow-up with thoracic surgery next week.  He has had CT of the chest with results outlined above.  Gave him a copy of his CT report so he would note a follow-up of the chest findings with thoracic surgery next week he had the possible liver lesion with GI.  He has ascites which is significant.  Explained to him that I felt it was most likely related to cirrhosis.  I recommended no alcohol intake.  He voiced understanding.  Stressed the importance of him maintaining follow-up with gastroenterology next week.  Explained he would likely need ongoing diuretic management and monitoring of his electrolytes and if he could not tolerate the diuretic treatment or if his ascites could not be minimized with diuretic therapy that he " may need scheduled ongoing paracenteses (explained that would be last resort if he could be maintained/managed with diuretic treatment.  I do think he has enough problems at the moment that it would make it hard for him to be able to attend his counseling appointments.  Would recommend his ability to maintain his counseling appointments be reassessed in early December.  Explained this is typically a lot of that would come from his primary care physician, but he does not have an appointment with them in the next few weeks so I agreed to go ahead and offer him a letter of support to delay his counseling at present.  Follow-up appointment in 6 weeks.  I would be happy to see him sooner if any new or worsening problems in the interim.    Follow Up:   There are no Patient Instructions on file for this visit.  No follow-ups on file.  Patient was provided After Visit Summary.     Venus Forte RN      CC: Rk Nelson MD     Letter of support to help him delay his counseling completion.    October 9, 2024    To whom it may concern:    Mr. Ravin Garza (date of birth May 12, 1960) has had recent hospitalization for right chest infection.  He has a chest tube that remains in place.  He has also had problems with cirrhosis/ascites.  He has frequent ongoing follow-ups with thoracic surgery and gastroenterology.  He is struggling with fatigue and other symptoms associated with his cirrhosis/ascites.  I think would be very hard for him to maintain completion of his counseling appointments at this time.  Would recommend his ability to continue participation be reassessed in early December.  If in the interim he can muster the energy to continue follow-up with the appointments feel it would be okay for him to attend if he can.    Thank you for your consideration.        Mickey Zamora MD

## 2024-10-09 ENCOUNTER — OFFICE VISIT (OUTPATIENT)
Age: 64
End: 2024-10-09
Payer: MEDICARE

## 2024-10-09 VITALS
BODY MASS INDEX: 21.92 KG/M2 | HEIGHT: 76 IN | HEART RATE: 103 BPM | TEMPERATURE: 97.3 F | OXYGEN SATURATION: 98 % | SYSTOLIC BLOOD PRESSURE: 107 MMHG | WEIGHT: 180 LBS | DIASTOLIC BLOOD PRESSURE: 80 MMHG

## 2024-10-09 DIAGNOSIS — J86.9 EMPYEMA OF LUNG: Primary | ICD-10-CM

## 2024-10-09 DIAGNOSIS — K70.31 ASCITES DUE TO ALCOHOLIC CIRRHOSIS: ICD-10-CM

## 2024-10-09 PROCEDURE — 99214 OFFICE O/P EST MOD 30 MIN: CPT | Performed by: INTERNAL MEDICINE

## 2024-10-09 RX ORDER — SUCRALFATE 1 G/1
TABLET ORAL
COMMUNITY

## (undated) DEVICE — DRAPE,UTILITY,TAPE,15X26,STERILE: Brand: MEDLINE

## (undated) DEVICE — 24 FR STRAIGHT – SOFT PVC CATHETER: Brand: PVC THORACIC CATHETERS

## (undated) DEVICE — PAD MINOR UNIVERSAL: Brand: MEDLINE INDUSTRIES, INC.

## (undated) DEVICE — OASIS DRAIN, SINGLE, INLINE & ATS COMPATIBLE: Brand: OASIS

## (undated) DEVICE — APPL CHLORAPREP HI/LITE 26ML ORNG

## (undated) DEVICE — GLV SURG BIOGEL LTX PF 7 1/2

## (undated) DEVICE — SUT SILK 2/0 FS BLK 18IN 685G